# Patient Record
Sex: FEMALE | Race: WHITE | Employment: FULL TIME
[De-identification: names, ages, dates, MRNs, and addresses within clinical notes are randomized per-mention and may not be internally consistent; named-entity substitution may affect disease eponyms.]

---

## 2017-02-16 ENCOUNTER — OFFICE VISIT (OUTPATIENT)
Dept: FAMILY MEDICINE CLINIC | Facility: CLINIC | Age: 62
End: 2017-02-16

## 2017-02-16 VITALS
SYSTOLIC BLOOD PRESSURE: 144 MMHG | WEIGHT: 234 LBS | DIASTOLIC BLOOD PRESSURE: 80 MMHG | TEMPERATURE: 98.8 F | BODY MASS INDEX: 33.58 KG/M2

## 2017-02-16 DIAGNOSIS — J40 BRONCHITIS: ICD-10-CM

## 2017-02-16 DIAGNOSIS — J06.9 VIRAL URI: Primary | ICD-10-CM

## 2017-02-16 PROCEDURE — 99213 OFFICE O/P EST LOW 20 MIN: CPT | Performed by: FAMILY MEDICINE

## 2017-02-16 RX ORDER — AZITHROMYCIN 250 MG/1
TABLET, FILM COATED ORAL
Qty: 1 PACKET | Refills: 0 | Status: SHIPPED | OUTPATIENT
Start: 2017-02-16 | End: 2017-05-10 | Stop reason: ALTCHOICE

## 2017-02-16 ASSESSMENT — ENCOUNTER SYMPTOMS
SHORTNESS OF BREATH: 1
VOICE CHANGE: 1
DIARRHEA: 0
TROUBLE SWALLOWING: 1
SORE THROAT: 1
NAUSEA: 0
VOMITING: 0
SINUS PRESSURE: 1
RHINORRHEA: 1
BACK PAIN: 0
COUGH: 1

## 2017-05-10 ENCOUNTER — OFFICE VISIT (OUTPATIENT)
Dept: FAMILY MEDICINE CLINIC | Age: 62
End: 2017-05-10
Payer: COMMERCIAL

## 2017-05-10 VITALS
HEART RATE: 75 BPM | WEIGHT: 240 LBS | DIASTOLIC BLOOD PRESSURE: 72 MMHG | BODY MASS INDEX: 34.44 KG/M2 | SYSTOLIC BLOOD PRESSURE: 124 MMHG

## 2017-05-10 DIAGNOSIS — S86.912A KNEE STRAIN, LEFT, INITIAL ENCOUNTER: Primary | ICD-10-CM

## 2017-05-10 PROCEDURE — 99213 OFFICE O/P EST LOW 20 MIN: CPT | Performed by: FAMILY MEDICINE

## 2017-05-10 RX ORDER — ETODOLAC 500 MG/1
500 TABLET, FILM COATED ORAL 2 TIMES DAILY PRN
Qty: 40 TABLET | Refills: 1 | Status: SHIPPED | OUTPATIENT
Start: 2017-05-10 | End: 2018-01-09 | Stop reason: ALTCHOICE

## 2017-05-10 ASSESSMENT — ENCOUNTER SYMPTOMS
COUGH: 0
BACK PAIN: 0
VOMITING: 0
DIARRHEA: 0
SINUS PRESSURE: 0
SHORTNESS OF BREATH: 0
SORE THROAT: 0
NAUSEA: 0

## 2017-05-12 DIAGNOSIS — S86.912A KNEE STRAIN, LEFT, INITIAL ENCOUNTER: ICD-10-CM

## 2017-06-13 DIAGNOSIS — E11.9 TYPE 2 DIABETES MELLITUS WITHOUT COMPLICATION, UNSPECIFIED LONG TERM INSULIN USE STATUS: ICD-10-CM

## 2017-06-13 DIAGNOSIS — E78.5 DYSLIPIDEMIA: ICD-10-CM

## 2017-06-13 LAB
ALBUMIN SERPL-MCNC: 4.1 G/DL
ALP BLD-CCNC: 64 U/L
ALT SERPL-CCNC: 35 U/L
AST SERPL-CCNC: 27 U/L
BASOPHILS ABSOLUTE: 49 /ΜL
BASOPHILS RELATIVE PERCENT: 0.7 %
BILIRUB SERPL-MCNC: 0.5 MG/DL (ref 0.1–1.4)
BUN BLDV-MCNC: 17 MG/DL
CALCIUM SERPL-MCNC: 9.7 MG/DL
CHLORIDE BLD-SCNC: 106 MMOL/L
CHOLESTEROL, TOTAL: 103 MG/DL
CHOLESTEROL/HDL RATIO: 3.1
CO2: 26 MMOL/L
CREAT SERPL-MCNC: 0.77 MG/DL
EOSINOPHILS ABSOLUTE: 217 /ΜL
EOSINOPHILS RELATIVE PERCENT: 3.1 %
GFR CALCULATED: 84
GLUCOSE BLD-MCNC: 186 MG/DL
HBA1C MFR BLD: 9.4 %
HCT VFR BLD CALC: 43.8 % (ref 36–46)
HDLC SERPL-MCNC: 33 MG/DL (ref 35–70)
HEMOGLOBIN: 14.4 G/DL (ref 12–16)
LDL CHOLESTEROL CALCULATED: 47 MG/DL (ref 0–160)
LYMPHOCYTES ABSOLUTE: 2184 /ΜL
LYMPHOCYTES RELATIVE PERCENT: 31.2 %
MCH RBC QN AUTO: 31.4 PG
MCHC RBC AUTO-ENTMCNC: 32.9 G/DL
MCV RBC AUTO: 95.4 FL
MONOCYTES ABSOLUTE: 616 /ΜL
MONOCYTES RELATIVE PERCENT: 8.8 %
NEUTROPHILS ABSOLUTE: 3934 /ΜL
NEUTROPHILS RELATIVE PERCENT: 56.2 %
PDW BLD-RTO: 12.4 %
PLATELET # BLD: 196 K/ΜL
PMV BLD AUTO: 12.1 FL
POTASSIUM SERPL-SCNC: 4.6 MMOL/L
RBC # BLD: 4.59 10^6/ΜL
SODIUM BLD-SCNC: 139 MMOL/L
TOTAL PROTEIN: 7.1
TRIGL SERPL-MCNC: 117 MG/DL
VLDLC SERPL CALC-MCNC: ABNORMAL MG/DL
WBC # BLD: 7 10^3/ML

## 2017-06-16 ENCOUNTER — OFFICE VISIT (OUTPATIENT)
Dept: FAMILY MEDICINE CLINIC | Age: 62
End: 2017-06-16
Payer: COMMERCIAL

## 2017-06-16 VITALS
SYSTOLIC BLOOD PRESSURE: 120 MMHG | WEIGHT: 242 LBS | DIASTOLIC BLOOD PRESSURE: 64 MMHG | HEART RATE: 91 BPM | BODY MASS INDEX: 38.89 KG/M2 | HEIGHT: 66 IN

## 2017-06-16 DIAGNOSIS — E11.9 TYPE 2 DIABETES MELLITUS WITHOUT COMPLICATION, UNSPECIFIED LONG TERM INSULIN USE STATUS: Primary | ICD-10-CM

## 2017-06-16 LAB
CREATININE URINE POCT: 200
MICROALBUMIN/CREAT 24H UR: 30 MG/G{CREAT}
MICROALBUMIN/CREAT UR-RTO: <30

## 2017-06-16 PROCEDURE — 99213 OFFICE O/P EST LOW 20 MIN: CPT | Performed by: FAMILY MEDICINE

## 2017-06-16 PROCEDURE — 82044 UR ALBUMIN SEMIQUANTITATIVE: CPT | Performed by: FAMILY MEDICINE

## 2017-06-16 ASSESSMENT — ENCOUNTER SYMPTOMS
COUGH: 0
VOMITING: 0
SINUS PRESSURE: 0
BACK PAIN: 0
SORE THROAT: 0
SHORTNESS OF BREATH: 0
DIARRHEA: 0
NAUSEA: 0

## 2017-06-16 ASSESSMENT — PATIENT HEALTH QUESTIONNAIRE - PHQ9
SUM OF ALL RESPONSES TO PHQ QUESTIONS 1-9: 1
SUM OF ALL RESPONSES TO PHQ9 QUESTIONS 1 & 2: 1
1. LITTLE INTEREST OR PLEASURE IN DOING THINGS: 0
2. FEELING DOWN, DEPRESSED OR HOPELESS: 1

## 2018-01-03 RX ORDER — ASPIRIN 81 MG
TABLET, DELAYED RELEASE (ENTERIC COATED) ORAL
Qty: 90 TABLET | Refills: 3 | Status: SHIPPED | OUTPATIENT
Start: 2018-01-03 | End: 2019-03-18 | Stop reason: SDUPTHER

## 2018-01-05 LAB
ALBUMIN SERPL-MCNC: 3.9 G/DL
ALP BLD-CCNC: 73 U/L
ALT SERPL-CCNC: 36 U/L
ANION GAP SERPL CALCULATED.3IONS-SCNC: NORMAL MMOL/L
AST SERPL-CCNC: 30 U/L
AVERAGE GLUCOSE: NORMAL
BASOPHILS ABSOLUTE: 0.8 /ΜL
BASOPHILS RELATIVE PERCENT: 50 %
BILIRUB SERPL-MCNC: 0.5 MG/DL (ref 0.1–1.4)
BUN BLDV-MCNC: 15 MG/DL
CALCIUM SERPL-MCNC: 9.3 MG/DL
CHLORIDE BLD-SCNC: 104 MMOL/L
CHOLESTEROL, TOTAL: 108 MG/DL
CHOLESTEROL/HDL RATIO: 3.6
CO2: 28 MMOL/L
CREAT SERPL-MCNC: 0.65 MG/DL
EOSINOPHILS ABSOLUTE: 2.1 /ΜL
EOSINOPHILS RELATIVE PERCENT: 130 %
GFR CALCULATED: 96
GLUCOSE BLD-MCNC: 269 MG/DL
HBA1C MFR BLD: 10.6 %
HCT VFR BLD CALC: 43.5 % (ref 36–46)
HDLC SERPL-MCNC: 30 MG/DL (ref 35–70)
HEMOGLOBIN: 14.5 G/DL (ref 12–16)
LDL CHOLESTEROL CALCULATED: 56 MG/DL (ref 0–160)
LYMPHOCYTES ABSOLUTE: 39.3 /ΜL
LYMPHOCYTES RELATIVE PERCENT: 2437 %
MCH RBC QN AUTO: 31.1 PG
MCHC RBC AUTO-ENTMCNC: 33.3 G/DL
MCV RBC AUTO: 93.3 FL
MONOCYTES ABSOLUTE: 9.3 /ΜL
MONOCYTES RELATIVE PERCENT: 577 %
NEUTROPHILS ABSOLUTE: 48.5 /ΜL
NEUTROPHILS RELATIVE PERCENT: 3007 %
PDW BLD-RTO: 11.9 %
PLATELET # BLD: 168 K/ΜL
PMV BLD AUTO: 13.2 FL
POTASSIUM SERPL-SCNC: 3.9 MMOL/L
RBC # BLD: 4.66 10^6/ΜL
SODIUM BLD-SCNC: 139 MMOL/L
TOTAL PROTEIN: 6.6
TRIGL SERPL-MCNC: 136 MG/DL
VLDLC SERPL CALC-MCNC: ABNORMAL MG/DL
WBC # BLD: 6.2 10^3/ML

## 2018-01-08 DIAGNOSIS — E11.9 TYPE 2 DIABETES MELLITUS WITHOUT COMPLICATION, UNSPECIFIED LONG TERM INSULIN USE STATUS: ICD-10-CM

## 2018-01-09 ENCOUNTER — OFFICE VISIT (OUTPATIENT)
Dept: FAMILY MEDICINE CLINIC | Age: 63
End: 2018-01-09
Payer: COMMERCIAL

## 2018-01-09 VITALS
WEIGHT: 232 LBS | SYSTOLIC BLOOD PRESSURE: 120 MMHG | HEART RATE: 108 BPM | DIASTOLIC BLOOD PRESSURE: 70 MMHG | BODY MASS INDEX: 37.28 KG/M2 | OXYGEN SATURATION: 98 % | HEIGHT: 66 IN

## 2018-01-09 DIAGNOSIS — I25.10 ASHD (ARTERIOSCLEROTIC HEART DISEASE): ICD-10-CM

## 2018-01-09 DIAGNOSIS — L82.1 SK (SEBORRHEIC KERATOSIS): ICD-10-CM

## 2018-01-09 DIAGNOSIS — H01.139 ECZEMA OF EYELID, UNSPECIFIED LATERALITY: ICD-10-CM

## 2018-01-09 DIAGNOSIS — E11.9 TYPE 2 DIABETES MELLITUS WITHOUT COMPLICATION, UNSPECIFIED LONG TERM INSULIN USE STATUS: Primary | ICD-10-CM

## 2018-01-09 DIAGNOSIS — E78.5 DYSLIPIDEMIA: ICD-10-CM

## 2018-01-09 PROCEDURE — 99213 OFFICE O/P EST LOW 20 MIN: CPT | Performed by: FAMILY MEDICINE

## 2018-01-09 ASSESSMENT — ENCOUNTER SYMPTOMS
EYE ITCHING: 1
SORE THROAT: 0
SHORTNESS OF BREATH: 0
DIARRHEA: 0
SINUS PRESSURE: 0
VOMITING: 0
FACIAL SWELLING: 0
COUGH: 0
NAUSEA: 0
VOICE CHANGE: 0
BACK PAIN: 0

## 2018-01-22 RX ORDER — LISINOPRIL 2.5 MG/1
TABLET ORAL
Qty: 90 TABLET | Refills: 3 | Status: SHIPPED | OUTPATIENT
Start: 2018-01-22 | End: 2019-01-04 | Stop reason: SDUPTHER

## 2018-01-22 RX ORDER — METFORMIN HYDROCHLORIDE 500 MG/1
TABLET, EXTENDED RELEASE ORAL
Qty: 360 TABLET | Refills: 3 | Status: SHIPPED | OUTPATIENT
Start: 2018-01-22 | End: 2018-05-24 | Stop reason: ALTCHOICE

## 2018-01-22 RX ORDER — ATORVASTATIN CALCIUM 80 MG/1
TABLET, FILM COATED ORAL
Qty: 90 TABLET | Refills: 3 | Status: SHIPPED | OUTPATIENT
Start: 2018-01-22 | End: 2019-01-04 | Stop reason: SDUPTHER

## 2018-01-22 RX ORDER — FENOFIBRATE 160 MG/1
TABLET ORAL
Qty: 90 TABLET | Refills: 3 | Status: SHIPPED | OUTPATIENT
Start: 2018-01-22 | End: 2019-01-04 | Stop reason: SDUPTHER

## 2018-03-30 ENCOUNTER — TELEPHONE (OUTPATIENT)
Dept: FAMILY MEDICINE CLINIC | Age: 63
End: 2018-03-30

## 2018-03-30 NOTE — TELEPHONE ENCOUNTER
The patient was calling in to see if she can get a refill of her Humulin. She says she was advised at her last appt to increase to 80 units in the am and 60 in the pm. She says she is due for a refill and will be out and she is going out of town next week and unless she gets her new rx she cannot get the medication refill. She says you advised her to stop some medications and change when she takes others. Please send to [facility] on [address].

## 2018-04-30 ENCOUNTER — TELEPHONE (OUTPATIENT)
Dept: FAMILY MEDICINE CLINIC | Age: 63
End: 2018-04-30

## 2018-04-30 RX ORDER — CEPHALEXIN 500 MG/1
500 CAPSULE ORAL 3 TIMES DAILY
Qty: 30 CAPSULE | Refills: 0 | Status: SHIPPED | OUTPATIENT
Start: 2018-04-30 | End: 2018-05-10

## 2018-05-24 ENCOUNTER — OFFICE VISIT (OUTPATIENT)
Dept: FAMILY MEDICINE CLINIC | Age: 63
End: 2018-05-24
Payer: COMMERCIAL

## 2018-05-24 VITALS
HEART RATE: 90 BPM | SYSTOLIC BLOOD PRESSURE: 130 MMHG | DIASTOLIC BLOOD PRESSURE: 70 MMHG | BODY MASS INDEX: 37.77 KG/M2 | WEIGHT: 234 LBS

## 2018-05-24 DIAGNOSIS — E11.9 TYPE 2 DIABETES MELLITUS WITHOUT COMPLICATION, UNSPECIFIED LONG TERM INSULIN USE STATUS: Primary | ICD-10-CM

## 2018-05-24 LAB — HBA1C MFR BLD: 12.5 %

## 2018-05-24 PROCEDURE — 83036 HEMOGLOBIN GLYCOSYLATED A1C: CPT | Performed by: FAMILY MEDICINE

## 2018-05-24 PROCEDURE — 99213 OFFICE O/P EST LOW 20 MIN: CPT | Performed by: FAMILY MEDICINE

## 2018-05-24 ASSESSMENT — ENCOUNTER SYMPTOMS
NAUSEA: 0
DIARRHEA: 0
SORE THROAT: 0
VOMITING: 0
BACK PAIN: 0
SHORTNESS OF BREATH: 0
COUGH: 0
SINUS PRESSURE: 0

## 2018-06-21 DIAGNOSIS — H01.139 ECZEMA OF EYELID, UNSPECIFIED LATERALITY: ICD-10-CM

## 2018-09-09 LAB
ALBUMIN SERPL-MCNC: 4.2 G/DL (ref 3.5–5.2)
ALK PHOSPHATASE: 84 U/L (ref 30–121)
ALT SERPL-CCNC: 40 U/L (ref 5–40)
ANION GAP SERPL CALCULATED.3IONS-SCNC: 12 MEQ/L (ref 10–19)
AST SERPL-CCNC: 38 U/L (ref 9–40)
AVERAGE GLUCOSE: 240 MG/DL (ref 66–114)
BILIRUB SERPL-MCNC: 0.5 MG/DL
BUN BLDV-MCNC: 19 MG/DL (ref 8–23)
CALCIUM SERPL-MCNC: 9.7 MG/DL (ref 8.5–10.5)
CHLORIDE BLD-SCNC: 101 MEQ/L (ref 95–107)
CHOLESTEROL/HDL RATIO: 3.6
CHOLESTEROL: 116 MG/DL
CO2: 28 MEQ/L (ref 19–31)
CREAT SERPL-MCNC: 0.7 MG/DL (ref 0.6–1.3)
EGFR AFRICAN AMERICAN: 108.4 ML/MIN/1.73 M2
EGFR IF NONAFRICAN AMERICAN: 93.5 ML/MIN/1.73 M2
GLUCOSE: 165 MG/DL (ref 70–99)
HBA1C MFR BLD: 10 % (ref 4.2–5.8)
HDLC SERPL-MCNC: 32.1 MG/DL
LDL CHOLESTEROL CALCULATED: 61 MG/DL
LDL/HDL RATIO: 1.9
POTASSIUM SERPL-SCNC: 5 MEQ/L (ref 3.5–5.4)
SODIUM BLD-SCNC: 141 MEQ/L (ref 135–146)
TOTAL PROTEIN: 6.9 G/DL (ref 6.1–8.3)
TRIGL SERPL-MCNC: 116 MG/DL
VLDLC SERPL CALC-MCNC: 23 MG/DL

## 2018-09-10 LAB
ABSOLUTE BASO #: 0.1 K/UL (ref 0–0.1)
ABSOLUTE EOS #: 0.1 K/UL (ref 0.1–0.4)
ABSOLUTE LYMPH #: 2.3 K/UL (ref 0.8–5.2)
ABSOLUTE MONO #: 0.6 K/UL (ref 0.1–0.9)
ABSOLUTE NEUT #: 3.6 K/UL (ref 1.3–9.1)
BASOPHILS RELATIVE PERCENT: 0.9 %
COMMENT: NORMAL
EOSINOPHILS RELATIVE PERCENT: 1.6 %
HCT VFR BLD CALC: 45 % (ref 36–48)
HEMOGLOBIN: 15.3 G/DL (ref 12–16)
LYMPHOCYTE %: 34.4 %
MCH RBC QN AUTO: 30.8 PG (ref 27–34)
MCHC RBC AUTO-ENTMCNC: 34 G/DL (ref 31–36)
MCV RBC AUTO: 90.5 FL (ref 80–100)
MONOCYTES # BLD: 9.2 %
NEUTROPHILS RELATIVE PERCENT: 53.6 %
PDW BLD-RTO: 12.1 % (ref 10.8–14.8)
PLATELETS: 167 K/UL (ref 150–450)
RBC: 4.97 M/UL (ref 4–5.5)
WBC: 6.8 K/UL (ref 3.7–10.8)

## 2018-09-26 ENCOUNTER — OFFICE VISIT (OUTPATIENT)
Dept: FAMILY MEDICINE CLINIC | Age: 63
End: 2018-09-26
Payer: COMMERCIAL

## 2018-09-26 VITALS
WEIGHT: 231 LBS | SYSTOLIC BLOOD PRESSURE: 112 MMHG | HEART RATE: 90 BPM | HEIGHT: 67 IN | BODY MASS INDEX: 36.26 KG/M2 | DIASTOLIC BLOOD PRESSURE: 78 MMHG

## 2018-09-26 DIAGNOSIS — Z23 IMMUNIZATION DUE: ICD-10-CM

## 2018-09-26 DIAGNOSIS — E11.9 TYPE 2 DIABETES MELLITUS WITHOUT COMPLICATION, UNSPECIFIED WHETHER LONG TERM INSULIN USE (HCC): Primary | ICD-10-CM

## 2018-09-26 DIAGNOSIS — L21.9 SEBORRHEA: ICD-10-CM

## 2018-09-26 DIAGNOSIS — E78.5 DYSLIPIDEMIA: ICD-10-CM

## 2018-09-26 LAB
CREATININE URINE POCT: 50
MICROALBUMIN/CREAT 24H UR: 10 MG/G{CREAT}
MICROALBUMIN/CREAT UR-RTO: <30

## 2018-09-26 PROCEDURE — 90471 IMMUNIZATION ADMIN: CPT | Performed by: FAMILY MEDICINE

## 2018-09-26 PROCEDURE — 99396 PREV VISIT EST AGE 40-64: CPT | Performed by: FAMILY MEDICINE

## 2018-09-26 PROCEDURE — 82044 UR ALBUMIN SEMIQUANTITATIVE: CPT | Performed by: FAMILY MEDICINE

## 2018-09-26 PROCEDURE — 90688 IIV4 VACCINE SPLT 0.5 ML IM: CPT | Performed by: FAMILY MEDICINE

## 2018-09-26 ASSESSMENT — PATIENT HEALTH QUESTIONNAIRE - PHQ9
SUM OF ALL RESPONSES TO PHQ QUESTIONS 1-9: 0
2. FEELING DOWN, DEPRESSED OR HOPELESS: 0
SUM OF ALL RESPONSES TO PHQ9 QUESTIONS 1 & 2: 0
1. LITTLE INTEREST OR PLEASURE IN DOING THINGS: 0
SUM OF ALL RESPONSES TO PHQ QUESTIONS 1-9: 0

## 2018-09-26 ASSESSMENT — ENCOUNTER SYMPTOMS
SHORTNESS OF BREATH: 0
VOMITING: 0
BACK PAIN: 1
SINUS PRESSURE: 0
COUGH: 0
NAUSEA: 0
DIARRHEA: 0
SORE THROAT: 0

## 2018-09-26 NOTE — PROGRESS NOTES
Bear Roa is a 66 y.o. female who presents today for her medical conditions/complaints as noted below. Bear Roa is c/o of Annual Exam; Discuss Labs; and Health Maintenance (micro after visit)    Routine follow up on DM she is restricting CHO. A1c has improved. HPI:     Visit Information    Have you changed or started any medications since your last visit including any over-the-counter medicines, vitamins, or herbal medicines? no   Are you having any side effects from any of your medications? -  no  Have you stopped taking any of your medications? Is so, why? -  no    Have you seen any other physician or provider since your last visit? No  Have you had any other diagnostic tests since your last visit? No  Have you been seen in the emergency room and/or had an admission to a hospital since we last saw you? No  Have you had your routine dental cleaning in the past 6 months? no    Have you activated your [employer] account? If not, what are your barriers?  Yes     Patient Care Team:  Maureen Diaz MD as PCP - General (Family Medicine)  Eve Moyer MD as Consulting Physician    Medical History Review  Past Medical, Family, and Social History reviewed and  contribute to the patient presenting condition    Health Maintenance   Topic Date Due    Hepatitis C screen  1955    HIV screen  04/09/1972    Shingles Vaccine (1 of 2 - 2 Dose Series) 04/09/2007    Diabetic retinal exam  08/18/2016    Diabetic foot exam  06/16/2018    Diabetic microalbuminuria test  06/16/2018    Flu vaccine (1) 09/01/2018    A1C test (Diabetic or Prediabetic)  12/08/2018    Breast cancer screen  12/22/2018    Lipid screen  09/08/2019    Potassium monitoring  09/08/2019    Creatinine monitoring  09/08/2019    Cervical cancer screen  12/21/2019    DTaP/Tdap/Td vaccine (2 - Td) 09/13/2023    Colon cancer screen colonoscopy  01/10/2027    Pneumococcal med risk  Completed       Past Medical History: problem, urgency and vaginal discharge. Musculoskeletal: Positive for arthralgias and back pain. Negative for myalgias. Skin: Negative for rash. Neurological: Negative for weakness, numbness and headaches. Psychiatric/Behavioral: Negative for sleep disturbance. Objective:   /78   Pulse 90   Ht 5' 7\" (1.702 m)   Wt 231 lb (104.8 kg)   BMI 36.18 kg/m²     Physical Exam   Constitutional: She is oriented to person, place, and time. She appears well-developed and well-nourished. No distress. HENT:   Head: Normocephalic and atraumatic. Mouth/Throat: No oropharyngeal exudate. Eyes: No scleral icterus. Neck: Neck supple. Carotid bruit is not present. Cardiovascular: Exam reveals no gallop and no friction rub. No murmur heard. Pulmonary/Chest: No respiratory distress. She has no wheezes. She has no rales. She exhibits no tenderness. Musculoskeletal: She exhibits no edema. Lymphadenopathy:     She has no cervical adenopathy. Neurological: She is alert and oriented to person, place, and time. No cranial nerve deficit. Sensory exam normal with monofilament testing, position sense is intact. Vascular exam is likewise normal.     Skin: Lesion (seborrhea changes scalp.) noted. No rash noted. She is not diaphoretic. Psychiatric: She has a normal mood and affect. Her behavior is normal. Judgment and thought content normal.       Assessment:       Diagnosis Orders   1. Type 2 diabetes mellitus without complication, unspecified whether long term insulin use (HCC)  POCT microalbumin    HM DIABETES FOOT EXAM    CBC Auto Differential    Comprehensive Metabolic Panel    Lipid Panel    Hemoglobin A1C   2. Immunization due  INFLUENZA, QUADV, 3 YRS AND OLDER, IM, MDV, 0.5ML (FLUZONE QUADV)   3. Dyslipidemia  Comprehensive Metabolic Panel    Lipid Panel   4. Seborrhea           Plan:      Return in about 6 months (around 3/26/2019).     Orders Placed This Encounter   Procedures    INFLUENZA, QUADV, 3 YRS AND OLDER, IM, MDV, 0.5ML (FLUZONE QUADV)    CBC Auto Differential     Standing Status:   Future     Standing Expiration Date:   9/26/2019    Comprehensive Metabolic Panel     Standing Status:   Future     Standing Expiration Date:   9/26/2019    Lipid Panel     Standing Status:   Future     Standing Expiration Date:   9/26/2019     Order Specific Question:   Is Patient Fasting?/# of Hours     Answer:   12 hour fast    Hemoglobin A1C     Standing Status:   Future     Standing Expiration Date:   9/26/2019    POCT microalbumin    HM DIABETES FOOT EXAM     No orders of the defined types were placed in this encounter. OTC HC crm to inflamed area avoid H2O2  Limit ASA, Exercise  Six month follow up.

## 2018-10-22 ENCOUNTER — OFFICE VISIT (OUTPATIENT)
Dept: FAMILY MEDICINE CLINIC | Age: 63
End: 2018-10-22
Payer: COMMERCIAL

## 2018-10-22 VITALS
WEIGHT: 230 LBS | DIASTOLIC BLOOD PRESSURE: 80 MMHG | HEART RATE: 99 BPM | TEMPERATURE: 98 F | BODY MASS INDEX: 36.02 KG/M2 | SYSTOLIC BLOOD PRESSURE: 136 MMHG

## 2018-10-22 DIAGNOSIS — J40 BRONCHITIS: Primary | ICD-10-CM

## 2018-10-22 PROCEDURE — 99213 OFFICE O/P EST LOW 20 MIN: CPT | Performed by: FAMILY MEDICINE

## 2018-10-22 RX ORDER — AZITHROMYCIN 250 MG/1
TABLET, FILM COATED ORAL
Qty: 1 PACKET | Refills: 0 | Status: SHIPPED | OUTPATIENT
Start: 2018-10-22 | End: 2019-03-07 | Stop reason: ALTCHOICE

## 2018-10-22 ASSESSMENT — ENCOUNTER SYMPTOMS
SINUS PRESSURE: 0
DIARRHEA: 0
COUGH: 1
VOMITING: 0
SORE THROAT: 1
SHORTNESS OF BREATH: 1
BACK PAIN: 0
NAUSEA: 0

## 2018-11-01 ENCOUNTER — TELEPHONE (OUTPATIENT)
Dept: FAMILY MEDICINE CLINIC | Age: 63
End: 2018-11-01

## 2018-11-01 RX ORDER — AZITHROMYCIN 250 MG/1
TABLET, FILM COATED ORAL
Qty: 1 PACKET | Refills: 0 | Status: SHIPPED | OUTPATIENT
Start: 2018-11-01 | End: 2019-03-07 | Stop reason: ALTCHOICE

## 2019-01-04 RX ORDER — FENOFIBRATE 160 MG/1
TABLET ORAL
Qty: 90 TABLET | Refills: 3 | Status: SHIPPED | OUTPATIENT
Start: 2019-01-04 | End: 2019-03-18 | Stop reason: SDUPTHER

## 2019-01-04 RX ORDER — LISINOPRIL 2.5 MG/1
TABLET ORAL
Qty: 90 TABLET | Refills: 3 | Status: SHIPPED | OUTPATIENT
Start: 2019-01-04 | End: 2019-03-18 | Stop reason: SDUPTHER

## 2019-01-04 RX ORDER — ATORVASTATIN CALCIUM 80 MG/1
TABLET, FILM COATED ORAL
Qty: 90 TABLET | Refills: 3 | Status: SHIPPED | OUTPATIENT
Start: 2019-01-04 | End: 2019-03-18 | Stop reason: SDUPTHER

## 2019-01-18 ENCOUNTER — TELEPHONE (OUTPATIENT)
Dept: FAMILY MEDICINE CLINIC | Age: 64
End: 2019-01-18

## 2019-02-18 DIAGNOSIS — Z12.31 ENCOUNTER FOR SCREENING MAMMOGRAM FOR BREAST CANCER: Primary | ICD-10-CM

## 2019-02-28 ENCOUNTER — HOSPITAL ENCOUNTER (OUTPATIENT)
Dept: MAMMOGRAPHY | Age: 64
Discharge: HOME | End: 2019-03-02
Payer: COMMERCIAL

## 2019-02-28 DIAGNOSIS — Z12.31 ENCOUNTER FOR SCREENING MAMMOGRAM FOR BREAST CANCER: ICD-10-CM

## 2019-02-28 LAB
ABSOLUTE BASO #: 0.1 K/UL (ref 0–0.1)
ABSOLUTE EOS #: 0.1 K/UL (ref 0.1–0.4)
ABSOLUTE LYMPH #: 2 K/UL (ref 0.8–5.2)
ABSOLUTE MONO #: 0.7 K/UL (ref 0.1–0.9)
ABSOLUTE NEUT #: 3.6 K/UL (ref 1.3–9.1)
ALBUMIN SERPL-MCNC: 4.1 G/DL (ref 3.5–5.2)
ALK PHOSPHATASE: 85 U/L (ref 30–121)
ALT SERPL-CCNC: 55 U/L (ref 5–40)
ANION GAP SERPL CALCULATED.3IONS-SCNC: 10 MEQ/L (ref 10–19)
AST SERPL-CCNC: 43 U/L (ref 9–40)
BASOPHILS RELATIVE PERCENT: 0.8 %
BILIRUB SERPL-MCNC: 0.7 MG/DL
BUN BLDV-MCNC: 14 MG/DL (ref 8–23)
CALCIUM SERPL-MCNC: 10.4 MG/DL (ref 8.5–10.5)
CHLORIDE BLD-SCNC: 100 MEQ/L (ref 95–107)
CHOLESTEROL/HDL RATIO: 3.6
CHOLESTEROL: 116 MG/DL
CO2: 29 MEQ/L (ref 19–31)
CREAT SERPL-MCNC: 0.8 MG/DL (ref 0.6–1.3)
EGFR AFRICAN AMERICAN: 92.2 ML/MIN/1.73 M2
EGFR IF NONAFRICAN AMERICAN: 79.6 ML/MIN/1.73 M2
EOSINOPHILS RELATIVE PERCENT: 2.2 %
GLUCOSE: 333 MG/DL (ref 70–99)
HCT VFR BLD CALC: 48 % (ref 36–48)
HDLC SERPL-MCNC: 31.9 MG/DL
HEMOGLOBIN: 15.9 G/DL (ref 12–16)
LDL CHOLESTEROL CALCULATED: 64 MG/DL
LDL/HDL RATIO: 2
LYMPHOCYTE %: 30.4 %
MCH RBC QN AUTO: 31.4 PG (ref 27–34)
MCHC RBC AUTO-ENTMCNC: 33.1 G/DL (ref 31–36)
MCV RBC AUTO: 94.7 FL (ref 80–100)
MONOCYTES # BLD: 10.1 %
NEUTROPHILS RELATIVE PERCENT: 56.2 %
PDW BLD-RTO: 11.6 % (ref 10.8–14.8)
PLATELETS: 179 K/UL (ref 150–450)
POTASSIUM SERPL-SCNC: 4.7 MEQ/L (ref 3.5–5.4)
RBC: 5.07 M/UL (ref 4–5.5)
SODIUM BLD-SCNC: 139 MEQ/L (ref 135–146)
TOTAL PROTEIN: 6.9 G/DL (ref 6.1–8.3)
TRIGL SERPL-MCNC: 99 MG/DL
VLDLC SERPL CALC-MCNC: 20 MG/DL
WBC: 6.4 K/UL (ref 3.7–10.8)

## 2019-02-28 PROCEDURE — 77063 BREAST TOMOSYNTHESIS BI: CPT

## 2019-03-01 LAB
AVERAGE GLUCOSE: 283 MG/DL (ref 66–114)
HBA1C MFR BLD: 11.5 %

## 2019-03-07 ENCOUNTER — OFFICE VISIT (OUTPATIENT)
Dept: FAMILY MEDICINE CLINIC | Age: 64
End: 2019-03-07
Payer: COMMERCIAL

## 2019-03-07 VITALS
SYSTOLIC BLOOD PRESSURE: 130 MMHG | BODY MASS INDEX: 36.65 KG/M2 | HEART RATE: 83 BPM | WEIGHT: 234 LBS | DIASTOLIC BLOOD PRESSURE: 68 MMHG

## 2019-03-07 DIAGNOSIS — E78.5 DYSLIPIDEMIA: ICD-10-CM

## 2019-03-07 DIAGNOSIS — E11.9 TYPE 2 DIABETES MELLITUS WITHOUT COMPLICATION, UNSPECIFIED WHETHER LONG TERM INSULIN USE (HCC): Primary | ICD-10-CM

## 2019-03-07 PROCEDURE — 99213 OFFICE O/P EST LOW 20 MIN: CPT | Performed by: FAMILY MEDICINE

## 2019-03-07 ASSESSMENT — ENCOUNTER SYMPTOMS
SORE THROAT: 0
BACK PAIN: 0
VOMITING: 0
SHORTNESS OF BREATH: 0
DIARRHEA: 0
NAUSEA: 0
COUGH: 0
SINUS PRESSURE: 0

## 2019-03-07 ASSESSMENT — PATIENT HEALTH QUESTIONNAIRE - PHQ9
2. FEELING DOWN, DEPRESSED OR HOPELESS: 0
SUM OF ALL RESPONSES TO PHQ QUESTIONS 1-9: 0
1. LITTLE INTEREST OR PLEASURE IN DOING THINGS: 0
SUM OF ALL RESPONSES TO PHQ QUESTIONS 1-9: 0
SUM OF ALL RESPONSES TO PHQ9 QUESTIONS 1 & 2: 0

## 2019-03-18 RX ORDER — ATORVASTATIN CALCIUM 80 MG/1
TABLET, FILM COATED ORAL
Qty: 90 TABLET | Refills: 3 | Status: SHIPPED | OUTPATIENT
Start: 2019-03-18 | End: 2020-04-27

## 2019-03-18 RX ORDER — LISINOPRIL 2.5 MG/1
TABLET ORAL
Qty: 90 TABLET | Refills: 3 | Status: SHIPPED | OUTPATIENT
Start: 2019-03-18 | End: 2020-02-10

## 2019-03-18 RX ORDER — ASPIRIN 81 MG/1
TABLET ORAL
Qty: 90 TABLET | Refills: 3 | Status: SHIPPED | OUTPATIENT
Start: 2019-03-18 | End: 2020-02-19

## 2019-03-18 RX ORDER — FENOFIBRATE 160 MG/1
TABLET ORAL
Qty: 90 TABLET | Refills: 3 | Status: SHIPPED | OUTPATIENT
Start: 2019-03-18 | End: 2020-02-10

## 2019-05-07 RX ORDER — INSULIN HUMAN 100 [IU]/ML
INJECTION, SUSPENSION SUBCUTANEOUS
Qty: 135 ML | Refills: 0 | Status: SHIPPED | OUTPATIENT
Start: 2019-05-07 | End: 2019-07-19 | Stop reason: SDUPTHER

## 2019-05-23 ENCOUNTER — TELEPHONE (OUTPATIENT)
Dept: FAMILY MEDICINE CLINIC | Age: 64
End: 2019-05-23

## 2019-05-23 RX ORDER — FLUCONAZOLE 150 MG/1
TABLET ORAL
Qty: 1 TABLET | Refills: 0 | Status: SHIPPED | OUTPATIENT
Start: 2019-05-23 | End: 2020-03-06 | Stop reason: ALTCHOICE

## 2019-05-31 ENCOUNTER — OFFICE VISIT (OUTPATIENT)
Dept: FAMILY MEDICINE CLINIC | Age: 64
End: 2019-05-31
Payer: COMMERCIAL

## 2019-05-31 VITALS
SYSTOLIC BLOOD PRESSURE: 132 MMHG | WEIGHT: 237 LBS | BODY MASS INDEX: 37.12 KG/M2 | HEART RATE: 82 BPM | OXYGEN SATURATION: 97 % | DIASTOLIC BLOOD PRESSURE: 70 MMHG

## 2019-05-31 DIAGNOSIS — E11.9 TYPE 2 DIABETES MELLITUS WITHOUT COMPLICATION, UNSPECIFIED WHETHER LONG TERM INSULIN USE (HCC): Primary | ICD-10-CM

## 2019-05-31 DIAGNOSIS — K13.0 INFLAMMATION OF LIPS: ICD-10-CM

## 2019-05-31 PROCEDURE — 99213 OFFICE O/P EST LOW 20 MIN: CPT | Performed by: FAMILY MEDICINE

## 2019-05-31 RX ORDER — METFORMIN HYDROCHLORIDE 500 MG/1
500 TABLET, EXTENDED RELEASE ORAL
Qty: 30 TABLET | Refills: 5 | Status: SHIPPED | OUTPATIENT
Start: 2019-05-31 | End: 2019-10-13 | Stop reason: SDUPTHER

## 2019-05-31 RX ORDER — NYSTATIN 100000 U/G
OINTMENT TOPICAL
Qty: 15 G | Refills: 0 | Status: SHIPPED | OUTPATIENT
Start: 2019-05-31 | End: 2019-11-25

## 2019-05-31 ASSESSMENT — ENCOUNTER SYMPTOMS
SHORTNESS OF BREATH: 0
DIARRHEA: 0
SORE THROAT: 0
SINUS PRESSURE: 0
NAUSEA: 0
COUGH: 0
VOMITING: 0
BACK PAIN: 0

## 2019-05-31 NOTE — PROGRESS NOTES
Kosta Carlos is a 60 y.o. female who presents todayfor her medical conditions/complaints as noted below. Kosta Carlos is here today c/oOther (chapped lips x2 months)  Lips peeling for several months.    :     Visit Information    Have you changed or started any medications since your last visit including any over-the-counter medicines, vitamins, or herbal medicines? no   Are you having any side effects from any of your medications? -  no  Have you stopped taking any of your medications? Is so, why? -  no    Have you seen any other physician or provider since your last visit? No  Have you had any other diagnostic tests since your last visit? No  Have you been seen in the emergency room and/or had an admission to a hospital since we last saw you? No  Have you had your routine dental cleaning in the past 6 months? no    Have you activated your [employer] account? If not, what are your barriers?  Yes     Patient Care Team:  Darryl Schilling MD as PCP - General (Family Medicine)  Taye Puente MD as Consulting Physician    Medical History Review  Past Medical, Family, and Social History reviewed and does not contribute to the patient presenting condition    Health Maintenance   Topic Date Due    Hepatitis C screen  1955    HIV screen  04/09/1972    Shingles Vaccine (1 of 2) 04/09/2007    Diabetic retinal exam  08/18/2016    A1C test (Diabetic or Prediabetic)  05/28/2019    Diabetic foot exam  09/26/2019    Diabetic microalbuminuria test  09/26/2019    Cervical cancer screen  12/21/2019    Lipid screen  02/28/2020    Potassium monitoring  02/28/2020    Creatinine monitoring  02/28/2020    Breast cancer screen  02/28/2021    DTaP/Tdap/Td vaccine (2 - Td) 09/13/2023    Colon cancer screen colonoscopy  01/10/2027    Flu vaccine  Completed    Pneumococcal 0-64 years Vaccine  Completed           HPI        Past Medical History:   Diagnosis Date    Diabetes mellitus ([address]     Dyslipidemia daily.     Dispense:  15 g     Refill:  0    metFORMIN (GLUCOPHAGE-XR) 500 MG extended release tablet     Sig: Take 1 tablet by mouth daily (with breakfast)     Dispense:  30 tablet     Refill:  5     Alternate ointments. Add metformin to see if we can help improve her DM numbers.

## 2019-07-02 ENCOUNTER — TELEPHONE (OUTPATIENT)
Dept: FAMILY MEDICINE CLINIC | Facility: CLINIC | Age: 64
End: 2019-07-02

## 2019-07-11 ENCOUNTER — OFFICE VISIT (OUTPATIENT)
Dept: FAMILY MEDICINE CLINIC | Age: 64
End: 2019-07-11
Payer: COMMERCIAL

## 2019-07-11 VITALS
BODY MASS INDEX: 36.87 KG/M2 | WEIGHT: 235.4 LBS | HEART RATE: 68 BPM | DIASTOLIC BLOOD PRESSURE: 70 MMHG | SYSTOLIC BLOOD PRESSURE: 132 MMHG

## 2019-07-11 DIAGNOSIS — E11.9 TYPE 2 DIABETES MELLITUS WITHOUT COMPLICATION, UNSPECIFIED WHETHER LONG TERM INSULIN USE (HCC): Primary | ICD-10-CM

## 2019-07-11 DIAGNOSIS — E78.5 DYSLIPIDEMIA: ICD-10-CM

## 2019-07-11 PROCEDURE — 99213 OFFICE O/P EST LOW 20 MIN: CPT | Performed by: FAMILY MEDICINE

## 2019-07-11 ASSESSMENT — ENCOUNTER SYMPTOMS
BACK PAIN: 0
VOMITING: 0
SORE THROAT: 0
SHORTNESS OF BREATH: 0
COUGH: 0
NAUSEA: 0
SINUS PRESSURE: 0
DIARRHEA: 0

## 2019-07-11 NOTE — PROGRESS NOTES
HENT: Negative for congestion, sinus pressure and sore throat. Eyes: Negative for visual disturbance. Respiratory: Negative for cough and shortness of breath. Cardiovascular: Negative for chest pain and leg swelling. Gastrointestinal: Negative for diarrhea, nausea and vomiting. Genitourinary: Negative for dysuria, menstrual problem, urgency and vaginal discharge. Musculoskeletal: Negative for arthralgias, back pain and myalgias. Skin: Negative for rash. Neurological: Negative for weakness, numbness and headaches. Psychiatric/Behavioral: Negative for sleep disturbance.       :   /70   Pulse 68   Wt 235 lb 6.4 oz (106.8 kg)   BMI 36.87 kg/m²     Physical Exam   Constitutional: She is oriented to person, place, and time. She appears well-developed and well-nourished. No distress. HENT:   Head: Normocephalic and atraumatic. Mouth/Throat: No oropharyngeal exudate. Eyes: No scleral icterus. Neck: Neck supple. Carotid bruit is not present. Cardiovascular: Exam reveals no gallop and no friction rub. No murmur heard. Pulmonary/Chest: No respiratory distress. She has no wheezes. She has no rales. She exhibits no tenderness. Musculoskeletal: She exhibits no edema. Lymphadenopathy:     She has no cervical adenopathy. Neurological: She is alert and oriented to person, place, and time. No cranial nerve deficit. Skin: No rash noted. She is not diaphoretic. Psychiatric: She has a normal mood and affect. Her behavior is normal. Judgment and thought content normal.       Assessment:       Diagnosis Orders   1. Type 2 diabetes mellitus without complication, unspecified whether long term insulin use (HCC)  CBC Auto Differential    Comprehensive Metabolic Panel    Lipid Panel    Hemoglobin A1C   2. Dyslipidemia  CBC Auto Differential    Comprehensive Metabolic Panel    Lipid Panel         Plan:      Return in about 4 months (around 11/11/2019).     Orders Placed This Encounter Procedures    CBC Auto Differential     Standing Status:   Future     Standing Expiration Date:   7/11/2020    Comprehensive Metabolic Panel     Standing Status:   Future     Standing Expiration Date:   7/11/2020    Lipid Panel     Standing Status:   Future     Standing Expiration Date:   7/11/2020     Order Specific Question:   Is Patient Fasting?/# of Hours     Answer:   12 hour fast    Hemoglobin A1C     Standing Status:   Future     Standing Expiration Date:   7/11/2020     No orders of the defined types were placed in this encounter.   Her A1c has dropped from 11.5 to 10.4% and it has been about 6 weeks on the metformin so will continue current plan and repeat labs in four months with follow up post.

## 2019-07-19 RX ORDER — INSULIN HUMAN 100 [IU]/ML
INJECTION, SUSPENSION SUBCUTANEOUS
Qty: 135 ML | Refills: 0 | Status: SHIPPED | OUTPATIENT
Start: 2019-07-19 | End: 2019-10-27 | Stop reason: SDUPTHER

## 2019-10-14 RX ORDER — METFORMIN HYDROCHLORIDE 500 MG/1
TABLET, EXTENDED RELEASE ORAL
Qty: 90 TABLET | Refills: 2 | Status: SHIPPED | OUTPATIENT
Start: 2019-10-14 | End: 2020-08-13

## 2019-10-28 RX ORDER — INSULIN HUMAN 100 [IU]/ML
INJECTION, SUSPENSION SUBCUTANEOUS
Qty: 135 ML | Refills: 0 | Status: SHIPPED | OUTPATIENT
Start: 2019-10-28 | End: 2019-11-25 | Stop reason: DRUGHIGH

## 2019-11-18 LAB
ALBUMIN SERPL-MCNC: 4 G/DL
ALP BLD-CCNC: 62 U/L
ALT SERPL-CCNC: 43 U/L
ANION GAP SERPL CALCULATED.3IONS-SCNC: NORMAL MMOL/L
AST SERPL-CCNC: 33 U/L
AVERAGE GLUCOSE: NORMAL
BASOPHILS ABSOLUTE: 61 /ΜL
BASOPHILS RELATIVE PERCENT: 1 %
BILIRUB SERPL-MCNC: 0.5 MG/DL (ref 0.1–1.4)
BUN BLDV-MCNC: 14 MG/DL
CALCIUM SERPL-MCNC: 10.1 MG/DL
CHLORIDE BLD-SCNC: 99 MMOL/L
CHOLESTEROL, TOTAL: 127 MG/DL
CHOLESTEROL/HDL RATIO: 3.3
CO2: 29 MMOL/L
CREAT SERPL-MCNC: 0.79 MG/DL
EOSINOPHILS ABSOLUTE: 146 /ΜL
EOSINOPHILS RELATIVE PERCENT: 2.4 %
GFR CALCULATED: 80
GLUCOSE BLD-MCNC: 267 MG/DL
HBA1C MFR BLD: 10.4 %
HCT VFR BLD CALC: 43.9 % (ref 36–46)
HDLC SERPL-MCNC: 39 MG/DL (ref 35–70)
HEMOGLOBIN: 14.8 G/DL (ref 12–16)
LDL CHOLESTEROL CALCULATED: 69 MG/DL (ref 0–160)
LYMPHOCYTES ABSOLUTE: 2111 /ΜL
LYMPHOCYTES RELATIVE PERCENT: 34.6 %
MCH RBC QN AUTO: 31.6 PG
MCHC RBC AUTO-ENTMCNC: 33.7 G/DL
MCV RBC AUTO: 93.6 FL
MONOCYTES ABSOLUTE: 567 /ΜL
MONOCYTES RELATIVE PERCENT: 9.3 %
NEUTROPHILS ABSOLUTE: 3215 /ΜL
NEUTROPHILS RELATIVE PERCENT: 52.7 %
PDW BLD-RTO: NORMAL %
PLATELET # BLD: 13.3 K/ΜL
PMV BLD AUTO: NORMAL FL
POTASSIUM SERPL-SCNC: 4.4 MMOL/L
RBC # BLD: 4.69 10^6/ΜL
SODIUM BLD-SCNC: 136 MMOL/L
TOTAL PROTEIN: 6.9
TRIGL SERPL-MCNC: 108 MG/DL
VLDLC SERPL CALC-MCNC: NORMAL MG/DL
WBC # BLD: 6.1 10^3/ML

## 2019-11-25 ENCOUNTER — OFFICE VISIT (OUTPATIENT)
Dept: FAMILY MEDICINE CLINIC | Age: 64
End: 2019-11-25
Payer: COMMERCIAL

## 2019-11-25 VITALS
HEIGHT: 66 IN | SYSTOLIC BLOOD PRESSURE: 112 MMHG | WEIGHT: 233.5 LBS | OXYGEN SATURATION: 97 % | HEART RATE: 65 BPM | BODY MASS INDEX: 37.52 KG/M2 | DIASTOLIC BLOOD PRESSURE: 62 MMHG

## 2019-11-25 DIAGNOSIS — E11.9 TYPE 2 DIABETES MELLITUS WITHOUT COMPLICATION, UNSPECIFIED WHETHER LONG TERM INSULIN USE (HCC): Primary | ICD-10-CM

## 2019-11-25 DIAGNOSIS — E78.5 DYSLIPIDEMIA: ICD-10-CM

## 2019-11-25 PROCEDURE — 99213 OFFICE O/P EST LOW 20 MIN: CPT | Performed by: FAMILY MEDICINE

## 2019-11-25 PROCEDURE — 90688 IIV4 VACCINE SPLT 0.5 ML IM: CPT | Performed by: FAMILY MEDICINE

## 2019-11-25 PROCEDURE — 90471 IMMUNIZATION ADMIN: CPT | Performed by: FAMILY MEDICINE

## 2019-11-25 ASSESSMENT — ENCOUNTER SYMPTOMS
NAUSEA: 0
SHORTNESS OF BREATH: 0
COUGH: 0
SORE THROAT: 0
BACK PAIN: 0
VOMITING: 0
DIARRHEA: 0
SINUS PRESSURE: 0

## 2019-12-26 DIAGNOSIS — E78.5 DYSLIPIDEMIA: ICD-10-CM

## 2019-12-26 DIAGNOSIS — E11.9 TYPE 2 DIABETES MELLITUS WITHOUT COMPLICATION, UNSPECIFIED WHETHER LONG TERM INSULIN USE (HCC): ICD-10-CM

## 2020-02-10 RX ORDER — INSULIN HUMAN 100 [IU]/ML
INJECTION, SUSPENSION SUBCUTANEOUS
Qty: 135 ML | Refills: 3 | Status: SHIPPED | OUTPATIENT
Start: 2020-02-10 | End: 2021-06-18 | Stop reason: SDUPTHER

## 2020-02-10 RX ORDER — FENOFIBRATE 160 MG/1
TABLET ORAL
Qty: 90 TABLET | Refills: 3 | Status: SHIPPED | OUTPATIENT
Start: 2020-02-10 | End: 2021-02-01 | Stop reason: SDUPTHER

## 2020-02-10 RX ORDER — LISINOPRIL 2.5 MG/1
TABLET ORAL
Qty: 90 TABLET | Refills: 3 | Status: SHIPPED | OUTPATIENT
Start: 2020-02-10 | End: 2021-03-29

## 2020-02-19 RX ORDER — ASPIRIN 81 MG/1
TABLET ORAL
Qty: 90 TABLET | Refills: 0 | Status: SHIPPED | OUTPATIENT
Start: 2020-02-19 | End: 2020-05-26

## 2020-03-06 ENCOUNTER — OFFICE VISIT (OUTPATIENT)
Dept: FAMILY MEDICINE CLINIC | Age: 65
End: 2020-03-06
Payer: COMMERCIAL

## 2020-03-06 ENCOUNTER — NURSE TRIAGE (OUTPATIENT)
Dept: OTHER | Facility: CLINIC | Age: 65
End: 2020-03-06

## 2020-03-06 VITALS
BODY MASS INDEX: 37.12 KG/M2 | WEIGHT: 230 LBS | DIASTOLIC BLOOD PRESSURE: 70 MMHG | OXYGEN SATURATION: 95 % | SYSTOLIC BLOOD PRESSURE: 116 MMHG | HEART RATE: 87 BPM

## 2020-03-06 PROCEDURE — 99213 OFFICE O/P EST LOW 20 MIN: CPT | Performed by: FAMILY MEDICINE

## 2020-03-06 RX ORDER — AZITHROMYCIN 250 MG/1
TABLET, FILM COATED ORAL
Qty: 1 PACKET | Refills: 0 | Status: SHIPPED | OUTPATIENT
Start: 2020-03-06 | End: 2021-03-29

## 2020-03-06 RX ORDER — PREDNISONE 20 MG/1
TABLET ORAL
Qty: 20 TABLET | Refills: 0 | Status: SHIPPED | OUTPATIENT
Start: 2020-03-06 | End: 2021-03-29

## 2020-03-06 ASSESSMENT — PATIENT HEALTH QUESTIONNAIRE - PHQ9
1. LITTLE INTEREST OR PLEASURE IN DOING THINGS: 0
2. FEELING DOWN, DEPRESSED OR HOPELESS: 0
SUM OF ALL RESPONSES TO PHQ9 QUESTIONS 1 & 2: 0
SUM OF ALL RESPONSES TO PHQ QUESTIONS 1-9: 0
SUM OF ALL RESPONSES TO PHQ QUESTIONS 1-9: 0

## 2020-03-06 ASSESSMENT — ENCOUNTER SYMPTOMS
SORE THROAT: 0
VOMITING: 0
SHORTNESS OF BREATH: 1
BACK PAIN: 0
DIARRHEA: 0
SINUS PRESSURE: 0
NAUSEA: 0
COUGH: 1

## 2020-03-06 NOTE — PROGRESS NOTES
Danilo Gaytan is a 60 y.o. female who presents todayfor her medical conditions/complaints as noted below. Danilo Gaytan is here today c/oCough and Shortness of Breath      :     HPI    Cough and dyspnea, for a few weeks. Past Medical History:   Diagnosis Date    Diabetes mellitus ([address]     Dyslipidemia     Urticaria, chronic       No past surgical history on file. Family History   Problem Relation Age of Onset    High Cholesterol Father     High Blood Pressure Father     Heart Disease Father     Diabetes Father     High Cholesterol Sister     High Blood Pressure Sister      Social History     Tobacco Use    Smoking status: Former Smoker     Packs/day: 1.00     Years: 21.00     Pack years: 21.00     Types: Cigarettes     Last attempt to quit: 2012     Years since quittin.7    Smokeless tobacco: Never Used   Substance Use Topics    Alcohol use: No      Current Outpatient Medications   Medication Sig Dispense Refill    azithromycin (ZITHROMAX Z-RADHA) 250 MG tablet Take 2 pills on day one then one pill daily til gone. 1 packet 0    predniSONE (DELTASONE) 20 MG tablet Take 3 tabs daily for 3 days, then 2 a day for 3 days, then 1 a day for 3 days, then 1/2 a day for 3 days.  20 tablet 0    aspirin (ASPIRIN LOW DOSE) 81 MG EC tablet TAKE 1 TABLET BY MOUTH ONCE DAILY 90 tablet 0    lisinopril (PRINIVIL;ZESTRIL) 2.5 MG tablet TAKE 1 TABLET BY MOUTH  DAILY 90 tablet 3    fenofibrate 160 MG tablet TAKE 1 TABLET BY MOUTH ONCE DAILY 90 tablet 3    HUMULIN N KWIKPEN 100 UNIT/ML injection pen INJECT SUBCUTANEOUSLY 80  UNITS IN THE MORNING AND 60 UNITS IN THE EVENING 135 mL 3    metoprolol tartrate (LOPRESSOR) 25 MG tablet TAKE 1 TABLET BY MOUTH  DAILY 90 tablet 3    metFORMIN (GLUCOPHAGE-XR) 500 MG extended release tablet TAKE 1 TABLET BY MOUTH  EVERY DAY 90 tablet 2    Insulin Pen Needle 31G X 8 MM MISC 1 each by Does not apply route 2 times daily 200 each 3    atorvastatin (LIPITOR) 80 MG tablet TAKE 1 TABLET BY MOUTH ONCE DAILY 90 tablet 3     No current facility-administered medications for this visit. Allergies   Allergen Reactions    Sulfa Antibiotics          Subjective:   Review of Systems   Constitutional: Negative for chills, diaphoresis and fever. HENT: Negative for congestion, sinus pressure and sore throat. Eyes: Negative for visual disturbance. Respiratory: Positive for cough and shortness of breath. Cardiovascular: Negative for chest pain and leg swelling. Gastrointestinal: Negative for diarrhea, nausea and vomiting. Genitourinary: Negative for dysuria, menstrual problem, urgency and vaginal discharge. Musculoskeletal: Negative for arthralgias, back pain and myalgias. Skin: Negative for rash. Neurological: Negative for weakness, numbness and headaches. Psychiatric/Behavioral: Negative for sleep disturbance.       :   /70   Pulse 87   Wt 230 lb (104.3 kg)   SpO2 95%   BMI 37.12 kg/m²     Physical Exam  Constitutional:       General: She is not in acute distress. Appearance: She is well-developed. She is not diaphoretic. HENT:      Head: Normocephalic and atraumatic. Mouth/Throat:      Pharynx: No oropharyngeal exudate. Eyes:      General: No scleral icterus. Neck:      Musculoskeletal: Neck supple. Vascular: No carotid bruit. Cardiovascular:      Heart sounds: No murmur. No friction rub. No gallop. Pulmonary:      Breath sounds: No wheezing or rales. Comments: Increase cough with deep inspiration but breath sounds are clear. Chest:      Chest wall: No tenderness. Lymphadenopathy:      Cervical: No cervical adenopathy. Skin:     Findings: No rash. Neurological:      Mental Status: She is alert and oriented to person, place, and time. Cranial Nerves: No cranial nerve deficit. Psychiatric:         Behavior: Behavior normal.         Thought Content:  Thought content normal.         Judgment: Judgment normal.

## 2020-03-26 ENCOUNTER — TELEPHONE (OUTPATIENT)
Dept: FAMILY MEDICINE CLINIC | Age: 65
End: 2020-03-26

## 2020-03-26 RX ORDER — FLUTICASONE PROPIONATE 110 UG/1
1 AEROSOL, METERED RESPIRATORY (INHALATION) 2 TIMES DAILY
Qty: 1 INHALER | Refills: 3 | Status: SHIPPED | OUTPATIENT
Start: 2020-03-26 | End: 2021-03-29

## 2020-03-26 RX ORDER — LEVOFLOXACIN 500 MG/1
500 TABLET, FILM COATED ORAL DAILY
Qty: 10 TABLET | Refills: 0 | Status: SHIPPED | OUTPATIENT
Start: 2020-03-26 | End: 2020-04-05

## 2020-03-26 RX ORDER — PREDNISONE 20 MG/1
20 TABLET ORAL 2 TIMES DAILY
Qty: 10 TABLET | Refills: 0 | Status: SHIPPED | OUTPATIENT
Start: 2020-03-26 | End: 2020-03-31

## 2020-05-26 RX ORDER — ASPIRIN 81 MG/1
TABLET ORAL
Qty: 90 TABLET | Refills: 0 | Status: SHIPPED | OUTPATIENT
Start: 2020-05-26 | End: 2020-08-14 | Stop reason: SDUPTHER

## 2020-06-17 ENCOUNTER — TELEPHONE (OUTPATIENT)
Dept: FAMILY MEDICINE CLINIC | Age: 65
End: 2020-06-17

## 2020-06-17 RX ORDER — FLUCONAZOLE 150 MG/1
150 TABLET ORAL ONCE
Qty: 1 TABLET | Refills: 0 | Status: SHIPPED | OUTPATIENT
Start: 2020-06-17 | End: 2022-03-28 | Stop reason: SDUPTHER

## 2020-06-17 NOTE — TELEPHONE ENCOUNTER
Patient is calling stating that since the 9th she has been having vaginal itching and burning. It has happened twice in the past 5 years and she was giving 1 pill and it went away. She is asking if she could have that pill again or does she need an appointment?

## 2020-07-06 LAB
ALBUMIN SERPL-MCNC: 4.2 G/DL
ALP BLD-CCNC: 64 U/L
ALT SERPL-CCNC: 50 U/L
ANION GAP SERPL CALCULATED.3IONS-SCNC: NORMAL MMOL/L
AST SERPL-CCNC: 49 U/L
AVERAGE GLUCOSE: NORMAL
BASOPHILS ABSOLUTE: 43 /ΜL
BASOPHILS RELATIVE PERCENT: 0.6 %
BILIRUB SERPL-MCNC: 0.6 MG/DL (ref 0.1–1.4)
BUN BLDV-MCNC: 21 MG/DL
CALCIUM SERPL-MCNC: 10.3 MG/DL
CHLORIDE BLD-SCNC: 99 MMOL/L
CHOLESTEROL, TOTAL: 138 MG/DL
CHOLESTEROL/HDL RATIO: 3.8
CO2: 28 MMOL/L
CREAT SERPL-MCNC: 0.84 MG/DL
EOSINOPHILS ABSOLUTE: 173 /ΜL
EOSINOPHILS RELATIVE PERCENT: 2.4 %
GFR CALCULATED: 74
GLUCOSE BLD-MCNC: 294 MG/DL
HBA1C MFR BLD: 9.7 %
HCT VFR BLD CALC: 46.4 % (ref 36–46)
HDLC SERPL-MCNC: 36 MG/DL (ref 35–70)
HEMOGLOBIN: 15.2 G/DL (ref 12–16)
LDL CHOLESTEROL CALCULATED: 77 MG/DL (ref 0–160)
LYMPHOCYTES ABSOLUTE: 2102 /ΜL
LYMPHOCYTES RELATIVE PERCENT: 29.2 %
MCH RBC QN AUTO: 31.5 PG
MCHC RBC AUTO-ENTMCNC: 32.8 G/DL
MCV RBC AUTO: 96.3 FL
MONOCYTES ABSOLUTE: 698 /ΜL
MONOCYTES RELATIVE PERCENT: 9.7 %
NEUTROPHILS ABSOLUTE: 4183 /ΜL
NEUTROPHILS RELATIVE PERCENT: 58.1 %
PDW BLD-RTO: 12.2 %
PLATELET # BLD: 187 K/ΜL
PMV BLD AUTO: 12.8 FL
POTASSIUM SERPL-SCNC: 4.9 MMOL/L
RBC # BLD: 4.82 10^6/ΜL
SODIUM BLD-SCNC: 135 MMOL/L
TOTAL PROTEIN: 6.8
TRIGL SERPL-MCNC: 146 MG/DL
VLDLC SERPL CALC-MCNC: 102 MG/DL
WBC # BLD: 7.2 10^3/ML

## 2020-07-07 ENCOUNTER — TELEPHONE (OUTPATIENT)
Dept: FAMILY MEDICINE CLINIC | Age: 65
End: 2020-07-07

## 2020-07-08 ENCOUNTER — HOSPITAL ENCOUNTER (OUTPATIENT)
Dept: MAMMOGRAPHY | Age: 65
Discharge: HOME | End: 2020-07-10
Payer: COMMERCIAL

## 2020-07-08 PROCEDURE — 77063 BREAST TOMOSYNTHESIS BI: CPT

## 2020-08-13 RX ORDER — METFORMIN HYDROCHLORIDE 500 MG/1
TABLET, EXTENDED RELEASE ORAL
Qty: 90 TABLET | Refills: 3 | Status: SHIPPED | OUTPATIENT
Start: 2020-08-13 | End: 2021-06-17 | Stop reason: ALTCHOICE

## 2020-08-13 NOTE — TELEPHONE ENCOUNTER
Next Visit Date:  No future appointments.     Health Maintenance   Topic Date Due    Hepatitis C screen  1955    HIV screen  04/09/1972    Shingles Vaccine (1 of 2) 04/09/2007    Diabetic retinal exam  08/18/2016    Diabetic foot exam  09/26/2019    Diabetic microalbuminuria test  09/26/2019    Cervical cancer screen  12/21/2019    Flu vaccine (1) 09/01/2020    A1C test (Diabetic or Prediabetic)  10/06/2020    Lipid screen  07/06/2021    Potassium monitoring  07/06/2021    Creatinine monitoring  07/06/2021    Breast cancer screen  07/08/2022    DTaP/Tdap/Td vaccine (2 - Td) 09/13/2023    Colon cancer screen colonoscopy  01/10/2027    Pneumococcal 0-64 years Vaccine  Completed    Hepatitis A vaccine  Aged Out    Hib vaccine  Aged Out    Meningococcal (ACWY) vaccine  Aged Out       Hemoglobin A1C (%)   Date Value   07/06/2020 9.7   11/18/2019 10.4   02/28/2019 11.5 (H)             ( goal A1C is < 7)   No results found for: LABMICR  LDL Calculated (mg/dL)   Date Value   07/06/2020 77   11/18/2019 69       (goal LDL is <100)   AST (U/L)   Date Value   07/06/2020 49     ALT (U/L)   Date Value   07/06/2020 50     BUN (mg/dL)   Date Value   07/06/2020 21     BP Readings from Last 3 Encounters:   03/06/20 116/70   11/25/19 112/62   07/11/19 132/70          (goal 120/80)    All Future Testing planned in [facility]  Lab Frequency Next Occurrence               Patient Active Problem List:     Dyslipidemia     Diabetes mellitus (HCC)     Urticaria, chronic     ASHD (arteriosclerotic heart disease)

## 2020-08-14 RX ORDER — ASPIRIN 81 MG/1
TABLET ORAL
Qty: 90 TABLET | Refills: 0 | Status: SHIPPED | OUTPATIENT
Start: 2020-08-14 | End: 2020-11-03

## 2020-09-30 ENCOUNTER — OFFICE VISIT (OUTPATIENT)
Dept: FAMILY MEDICINE CLINIC | Age: 65
End: 2020-09-30
Payer: COMMERCIAL

## 2020-09-30 VITALS
HEIGHT: 66 IN | DIASTOLIC BLOOD PRESSURE: 80 MMHG | OXYGEN SATURATION: 96 % | WEIGHT: 233.8 LBS | SYSTOLIC BLOOD PRESSURE: 120 MMHG | BODY MASS INDEX: 37.57 KG/M2 | HEART RATE: 92 BPM | TEMPERATURE: 97.5 F

## 2020-09-30 PROCEDURE — 90686 IIV4 VACC NO PRSV 0.5 ML IM: CPT | Performed by: FAMILY MEDICINE

## 2020-09-30 PROCEDURE — 90471 IMMUNIZATION ADMIN: CPT | Performed by: FAMILY MEDICINE

## 2020-09-30 PROCEDURE — 99213 OFFICE O/P EST LOW 20 MIN: CPT | Performed by: FAMILY MEDICINE

## 2020-09-30 SDOH — ECONOMIC STABILITY: INCOME INSECURITY: HOW HARD IS IT FOR YOU TO PAY FOR THE VERY BASICS LIKE FOOD, HOUSING, MEDICAL CARE, AND HEATING?: NOT HARD AT ALL

## 2020-09-30 SDOH — ECONOMIC STABILITY: TRANSPORTATION INSECURITY
IN THE PAST 12 MONTHS, HAS LACK OF TRANSPORTATION KEPT YOU FROM MEETINGS, WORK, OR FROM GETTING THINGS NEEDED FOR DAILY LIVING?: NO

## 2020-09-30 SDOH — ECONOMIC STABILITY: FOOD INSECURITY: WITHIN THE PAST 12 MONTHS, YOU WORRIED THAT YOUR FOOD WOULD RUN OUT BEFORE YOU GOT MONEY TO BUY MORE.: NEVER TRUE

## 2020-09-30 SDOH — ECONOMIC STABILITY: TRANSPORTATION INSECURITY
IN THE PAST 12 MONTHS, HAS THE LACK OF TRANSPORTATION KEPT YOU FROM MEDICAL APPOINTMENTS OR FROM GETTING MEDICATIONS?: NO

## 2020-09-30 SDOH — ECONOMIC STABILITY: FOOD INSECURITY: WITHIN THE PAST 12 MONTHS, THE FOOD YOU BOUGHT JUST DIDN'T LAST AND YOU DIDN'T HAVE MONEY TO GET MORE.: NEVER TRUE

## 2020-09-30 ASSESSMENT — ENCOUNTER SYMPTOMS
NAUSEA: 0
VOMITING: 0
COUGH: 0
SORE THROAT: 0
DIARRHEA: 0
BACK PAIN: 0
SHORTNESS OF BREATH: 0
SINUS PRESSURE: 0

## 2020-09-30 ASSESSMENT — PATIENT HEALTH QUESTIONNAIRE - PHQ9
SUM OF ALL RESPONSES TO PHQ QUESTIONS 1-9: 0
1. LITTLE INTEREST OR PLEASURE IN DOING THINGS: 0
2. FEELING DOWN, DEPRESSED OR HOPELESS: 0
SUM OF ALL RESPONSES TO PHQ9 QUESTIONS 1 & 2: 0
SUM OF ALL RESPONSES TO PHQ QUESTIONS 1-9: 0

## 2020-09-30 NOTE — PROGRESS NOTES
Avinash Walker is a 63 y.o. female who presents todayfor her medical conditions/complaints as noted below. Avinash Walker is here today c/oArthritis      :     HPI    Hip pain and hand pains. Wants tx options. Past Medical History:   Diagnosis Date    Diabetes mellitus ([address]     Dyslipidemia     Urticaria, chronic       No past surgical history on file. Family History   Problem Relation Age of Onset    High Cholesterol Father     High Blood Pressure Father     Heart Disease Father     Diabetes Father     High Cholesterol Sister     High Blood Pressure Sister      Social History     Tobacco Use    Smoking status: Former Smoker     Packs/day: 1.00     Years: 21.00     Pack years: 21.00     Types: Cigarettes     Last attempt to quit: 2012     Years since quittin.2    Smokeless tobacco: Never Used   Substance Use Topics    Alcohol use: No      Current Outpatient Medications   Medication Sig Dispense Refill    diclofenac sodium (VOLTAREN) 1 % GEL Apply 4 g topically 4 times daily 4 Tube 3    aspirin (ASPIRIN LOW DOSE) 81 MG EC tablet TAKE 1 TABLET BY MOUTH ONCE DAILY 90 tablet 0    metFORMIN (GLUCOPHAGE-XR) 500 MG extended release tablet TAKE 1 TABLET BY MOUTH  EVERY DAY 90 tablet 3    atorvastatin (LIPITOR) 80 MG tablet TAKE 1 TABLET BY MOUTH ONCE DAILY 90 tablet 3    fluticasone (FLOVENT HFA) 110 MCG/ACT inhaler Inhale 1 puff into the lungs 2 times daily 1 Inhaler 3    azithromycin (ZITHROMAX Z-RADHA) 250 MG tablet Take 2 pills on day one then one pill daily til gone. 1 packet 0    predniSONE (DELTASONE) 20 MG tablet Take 3 tabs daily for 3 days, then 2 a day for 3 days, then 1 a day for 3 days, then 1/2 a day for 3 days.  20 tablet 0    lisinopril (PRINIVIL;ZESTRIL) 2.5 MG tablet TAKE 1 TABLET BY MOUTH  DAILY 90 tablet 3    fenofibrate 160 MG tablet TAKE 1 TABLET BY MOUTH ONCE DAILY 90 tablet 3    HUMULIN N KWIKPEN 100 UNIT/ML injection pen INJECT SUBCUTANEOUSLY 80  UNITS IN

## 2021-02-01 DIAGNOSIS — E78.5 DYSLIPIDEMIA: Primary | ICD-10-CM

## 2021-02-01 RX ORDER — FENOFIBRATE 160 MG/1
160 TABLET ORAL DAILY
Qty: 90 TABLET | Refills: 1 | Status: SHIPPED | OUTPATIENT
Start: 2021-02-01 | End: 2021-02-05 | Stop reason: SDUPTHER

## 2021-02-01 NOTE — TELEPHONE ENCOUNTER
Luisito Warren is calling to request a refill on the following medication(s):    Medication Request:  Requested Prescriptions     Pending Prescriptions Disp Refills    fenofibrate (TRIGLIDE) 160 MG tablet 90 tablet 3       Last Visit Date (If Applicable):  Visit date not found    Next Visit Date:    3/29/2021

## 2021-02-05 ENCOUNTER — TELEPHONE (OUTPATIENT)
Dept: FAMILY MEDICINE CLINIC | Age: 66
End: 2021-02-05

## 2021-02-05 DIAGNOSIS — E78.5 DYSLIPIDEMIA: ICD-10-CM

## 2021-02-05 RX ORDER — FENOFIBRATE 160 MG/1
160 TABLET ORAL DAILY
Qty: 90 TABLET | Refills: 1 | Status: SHIPPED | OUTPATIENT
Start: 2021-02-05 | End: 2021-07-07

## 2021-03-24 LAB
ALBUMIN SERPL-MCNC: 3.9 G/DL
ALP BLD-CCNC: 68 U/L
ALT SERPL-CCNC: 38 U/L
ANION GAP SERPL CALCULATED.3IONS-SCNC: NORMAL MMOL/L
AST SERPL-CCNC: 31 U/L
AVERAGE GLUCOSE: 267
BASOPHILS ABSOLUTE: 70 /ΜL
BASOPHILS RELATIVE PERCENT: 0.9 %
BILIRUB SERPL-MCNC: 0.7 MG/DL (ref 0.1–1.4)
BUN BLDV-MCNC: 17 MG/DL
CALCIUM SERPL-MCNC: 10.1 MG/DL
CHLORIDE BLD-SCNC: 102 MMOL/L
CHOLESTEROL, TOTAL: 114 MG/DL
CHOLESTEROL/HDL RATIO: 3
CO2: 33 MMOL/L
CREAT SERPL-MCNC: 0.79 MG/DL
EOSINOPHILS ABSOLUTE: 265 /ΜL
EOSINOPHILS RELATIVE PERCENT: 3.4 %
GFR CALCULATED: 80
GLUCOSE BLD-MCNC: 267 MG/DL
HBA1C MFR BLD: 10.5 %
HCT VFR BLD CALC: 46 % (ref 36–46)
HDLC SERPL-MCNC: 38 MG/DL (ref 35–70)
HEMOGLOBIN: 15.3 G/DL (ref 12–16)
LDL CHOLESTEROL CALCULATED: 55 MG/DL (ref 0–160)
LYMPHOCYTES ABSOLUTE: 2519 /ΜL
LYMPHOCYTES RELATIVE PERCENT: 32.3 %
MCH RBC QN AUTO: 31.3 PG
MCHC RBC AUTO-ENTMCNC: 33.3 G/DL
MCV RBC AUTO: 94.1 FL
MONOCYTES ABSOLUTE: 694 /ΜL
MONOCYTES RELATIVE PERCENT: 8.9 %
NEUTROPHILS ABSOLUTE: 4251 /ΜL
NEUTROPHILS RELATIVE PERCENT: 54.5 %
NONHDLC SERPL-MCNC: 76 MG/DL
PDW BLD-RTO: 11.9 %
PLATELET # BLD: 193 K/ΜL
PMV BLD AUTO: 12.2 FL
POTASSIUM SERPL-SCNC: 5.1 MMOL/L
RBC # BLD: 4.89 10^6/ΜL
SODIUM BLD-SCNC: 140 MMOL/L
TOTAL PROTEIN: 6.9
TRIGL SERPL-MCNC: 126 MG/DL
VLDLC SERPL CALC-MCNC: NORMAL MG/DL
WBC # BLD: 7.8 10^3/ML

## 2021-03-29 ENCOUNTER — OFFICE VISIT (OUTPATIENT)
Dept: FAMILY MEDICINE CLINIC | Age: 66
End: 2021-03-29
Payer: COMMERCIAL

## 2021-03-29 VITALS
OXYGEN SATURATION: 96 % | TEMPERATURE: 98.2 F | SYSTOLIC BLOOD PRESSURE: 126 MMHG | WEIGHT: 230 LBS | BODY MASS INDEX: 37.14 KG/M2 | DIASTOLIC BLOOD PRESSURE: 70 MMHG | HEART RATE: 73 BPM

## 2021-03-29 DIAGNOSIS — E78.5 DYSLIPIDEMIA: ICD-10-CM

## 2021-03-29 DIAGNOSIS — R80.9 MICROALBUMINURIA: ICD-10-CM

## 2021-03-29 DIAGNOSIS — I25.10 ASHD (ARTERIOSCLEROTIC HEART DISEASE): ICD-10-CM

## 2021-03-29 DIAGNOSIS — R74.8 ELEVATED LIVER ENZYMES: ICD-10-CM

## 2021-03-29 DIAGNOSIS — M15.9 PRIMARY OSTEOARTHRITIS INVOLVING MULTIPLE JOINTS: ICD-10-CM

## 2021-03-29 DIAGNOSIS — E11.9 TYPE 2 DIABETES MELLITUS WITHOUT COMPLICATION, UNSPECIFIED WHETHER LONG TERM INSULIN USE (HCC): ICD-10-CM

## 2021-03-29 DIAGNOSIS — Z00.00 HEALTH CARE MAINTENANCE: ICD-10-CM

## 2021-03-29 DIAGNOSIS — E11.9 TYPE 2 DIABETES MELLITUS WITHOUT COMPLICATION, UNSPECIFIED WHETHER LONG TERM INSULIN USE (HCC): Primary | ICD-10-CM

## 2021-03-29 PROBLEM — M15.0 PRIMARY OSTEOARTHRITIS INVOLVING MULTIPLE JOINTS: Status: ACTIVE | Noted: 2021-03-29

## 2021-03-29 LAB
CREATININE URINE POCT: 50
MICROALBUMIN/CREAT 24H UR: 10 MG/G{CREAT}
MICROALBUMIN/CREAT UR-RTO: NORMAL

## 2021-03-29 PROCEDURE — 99214 OFFICE O/P EST MOD 30 MIN: CPT | Performed by: FAMILY MEDICINE

## 2021-03-29 PROCEDURE — 82044 UR ALBUMIN SEMIQUANTITATIVE: CPT | Performed by: FAMILY MEDICINE

## 2021-03-29 RX ORDER — LISINOPRIL 2.5 MG/1
5 TABLET ORAL DAILY
Qty: 90 TABLET | Refills: 3
Start: 2021-03-29 | End: 2021-07-15 | Stop reason: SDUPTHER

## 2021-03-29 ASSESSMENT — ENCOUNTER SYMPTOMS
ALLERGIC/IMMUNOLOGIC NEGATIVE: 1
RESPIRATORY NEGATIVE: 1
CHEST TIGHTNESS: 0
SHORTNESS OF BREATH: 0
VISUAL CHANGE: 0
BLURRED VISION: 0
GASTROINTESTINAL NEGATIVE: 1
EYES NEGATIVE: 1

## 2021-03-29 ASSESSMENT — PATIENT HEALTH QUESTIONNAIRE - PHQ9
SUM OF ALL RESPONSES TO PHQ9 QUESTIONS 1 & 2: 0
SUM OF ALL RESPONSES TO PHQ QUESTIONS 1-9: 0
SUM OF ALL RESPONSES TO PHQ QUESTIONS 1-9: 0

## 2021-03-29 NOTE — PROGRESS NOTES
cardiologist.     Urine microalbuminuria - most likely 2/2 to uncontroled diabetes. On Lisinopril    Diabetes  She presents for her follow-up diabetic visit. She has type 2 diabetes mellitus. Her disease course has been worsening. There are no hypoglycemic associated symptoms. Pertinent negatives for hypoglycemia include no dizziness. There are no diabetic associated symptoms. Pertinent negatives for diabetes include no blurred vision, no chest pain, no fatigue, no foot paresthesias, no foot ulcerations, no polydipsia, no polyphagia, no polyuria, no visual change, no weakness and no weight loss. Symptoms are stable. Current diabetic treatment includes insulin injections and oral agent (monotherapy). She is compliant with treatment most of the time. Her weight is increasing steadily. Her home blood glucose trend is increasing steadily. An ACE inhibitor/angiotensin II receptor blocker is being taken. Hyperlipidemia  This is a chronic problem. The current episode started more than 1 year ago. Exacerbating diseases include diabetes, liver disease and obesity. She has no history of chronic renal disease, hypothyroidism or nephrotic syndrome. Pertinent negatives include no chest pain, focal sensory loss, focal weakness, leg pain, myalgias or shortness of breath. Current antihyperlipidemic treatment includes statins and fibric acid derivatives. The current treatment provides moderate improvement of lipids. Coronary Artery Disease  Presents for follow-up visit. Pertinent negatives include no chest pain, chest pressure, chest tightness, dizziness, leg swelling, muscle weakness, palpitations, shortness of breath or weight gain. Risk factors include hyperlipidemia and obesity. The symptoms have been stable. Compliance with diet is variable. Compliance with exercise is variable. Compliance with medications is good. Review of Systems   Review of Systems   Constitutional: Negative.   Negative for fatigue, weight gain and weight loss. HENT: Negative. Eyes: Negative. Negative for blurred vision. Respiratory: Negative. Negative for chest tightness and shortness of breath. Cardiovascular: Negative. Negative for chest pain, palpitations and leg swelling. Gastrointestinal: Negative. Endocrine: Negative. Negative for polydipsia, polyphagia and polyuria. Genitourinary: Negative. Musculoskeletal: Negative. Negative for myalgias and muscle weakness. Skin: Negative. Allergic/Immunologic: Negative. Neurological: Negative. Negative for dizziness, focal weakness and weakness. Hematological: Negative. Psychiatric/Behavioral: Negative. All other systems reviewed and are negative. Medications     Current Outpatient Medications   Medication Sig Dispense Refill    lisinopril (PRINIVIL;ZESTRIL) 2.5 MG tablet Take 2 tablets by mouth daily 90 tablet 3    fenofibrate (TRIGLIDE) 160 MG tablet Take 1 tablet by mouth daily 90 tablet 1    aspirin (ASPIRIN LOW DOSE) 81 MG EC tablet TAKE 1 TABLET BY MOUTH ONCE DAILY 90 tablet 3    diclofenac sodium (VOLTAREN) 1 % GEL Apply 4 g topically 4 times daily 4 Tube 3    metFORMIN (GLUCOPHAGE-XR) 500 MG extended release tablet TAKE 1 TABLET BY MOUTH  EVERY DAY 90 tablet 3    atorvastatin (LIPITOR) 80 MG tablet TAKE 1 TABLET BY MOUTH ONCE DAILY 90 tablet 3    HUMULIN N KWIKPEN 100 UNIT/ML injection pen INJECT SUBCUTANEOUSLY 80  UNITS IN THE MORNING AND 60 UNITS IN THE EVENING 135 mL 3    metoprolol tartrate (LOPRESSOR) 25 MG tablet TAKE 1 TABLET BY MOUTH  DAILY 90 tablet 3     No current facility-administered medications for this visit. Past History    Past Medical History:   has a past medical history of Diabetes mellitus ([address] Dyslipidemia, and Urticaria, chronic. Social History:   reports that she quit smoking about 8 years ago. Her smoking use included cigarettes. She has a 21.00 pack-year smoking history.  She has never used smokeless tobacco. She reports that she does not drink alcohol or use drugs. Family History:   Family History   Problem Relation Age of Onset    High Cholesterol Father     High Blood Pressure Father     Heart Disease Father     Diabetes Father     High Cholesterol Sister     High Blood Pressure Sister        Surgical History: History reviewed. No pertinent surgical history. Physical Examination      Vitals:  /70   Pulse 73   Temp 98.2 °F (36.8 °C)   Wt 230 lb (104.3 kg)   SpO2 96%   BMI 37.14 kg/m²     Physical Exam  Vitals signs and nursing note reviewed. Constitutional:       General: She is not in acute distress. Appearance: Normal appearance. She is obese. She is not ill-appearing, toxic-appearing or diaphoretic. HENT:      Head: Normocephalic and atraumatic. Eyes:      General: No scleral icterus. Right eye: No discharge. Left eye: No discharge. Extraocular Movements: Extraocular movements intact. Conjunctiva/sclera: Conjunctivae normal.   Cardiovascular:      Rate and Rhythm: Normal rate and regular rhythm. Pulses: Normal pulses. Heart sounds: Normal heart sounds. No murmur. No friction rub. No gallop. Pulmonary:      Effort: Pulmonary effort is normal. No respiratory distress. Breath sounds: Normal breath sounds. No stridor. No wheezing, rhonchi or rales. Chest:      Chest wall: No tenderness. Neurological:      Mental Status: She is alert and oriented to person, place, and time. Mental status is at baseline. Psychiatric:         Mood and Affect: Mood normal.         Behavior: Behavior normal.         Thought Content:  Thought content normal.         Judgment: Judgment normal.     Visual inspection:  Deformity/amputation: absent  Skin lesions/pre-ulcerative calluses: absent  Edema: right- negative, left- negative    Sensory exam:  Monofilament sensation: abnormal - one positive spot per foot  (minimum of 5 random plantar locations tested, avoiding callused areas - > 1 area with absence of sensation is + for neuropathy)    Plus at least one of the following:  Pulses: normal,       Labs/Imaging/Diagnostics   Labs:  Hemoglobin A1C   Date Value Ref Range Status   03/24/2021 10.5 % Final       Imaging Last 24 Hours:  [facility] DIGITAL SCREEN SELF REFERRAL W OR WO CAD BILATERAL  Narrative: EXAMINATION:  SCREENING DIGITAL BILATERAL  MAMMOGRAM WITH TOMOSYNTHESIS, 7/8/2020    TECHNIQUE:  Screening mammography of the bilateral breasts was performed with  tomosynthesis. 2D standard and 3D tomosynthesis combination imaging  performed through both breasts in the MLO and CC projection. Computer aided  detection was utilized in the interpretation of this exam.    COMPARISON:  02/28/2019, 12/22/2016    HISTORY:  Screening. FINDINGS:  The breast tissue is fatty replaced. There is no suspicious mass, suspicious  microcalcification, or area of architectural distortion. Impression: No mammographic evidence of malignancy. BI-RADS 1    BIRADS:  BIRADS - CATEGORY 1    Negative, no evidence of malignancy. Normal interval follow-up is  recommended in 12 months. OVERALL ASSESSMENT - NEGATIVE    A letter of notification will be sent to the patient regarding the results. The [employer] recommends annual mammograms for women 40  years and older.

## 2021-03-29 NOTE — PATIENT INSTRUCTIONS
Patient Education        Counting Carbohydrates: Care Instructions  Your Care Instructions     You don't have to eat special foods when you have diabetes. You just have to be careful to eat healthy foods. Carbohydrates (carbs) raise blood sugar higher and quicker than any other nutrient. Carbs are found in desserts, breads and cereals, and fruit. They're also in starchy vegetables. These include potatoes, corn, and grains such as rice and pasta. Carbs are also in milk and yogurt. The more carbs you eat at one time, the higher your blood sugar will rise. Spreading carbs all through the day helps keep your blood sugar levels within your target range. Counting carbs is one of the best ways to keep your blood sugar under control. If you use insulin, counting carbs helps you match the right amount of insulin to the number of grams of carbs in a meal. Then you can change your diet and insulin dose as needed. Testing your blood sugar several times a day can help you learn how carbs affect your blood sugar. A registered dietitian or certified diabetes educator can help you plan meals and snacks. Follow-up care is a key part of your treatment and safety. Be sure to make and go to all appointments, and call your doctor if you are having problems. It's also a good idea to know your test results and keep a list of the medicines you take. How can you care for yourself at home? Know your daily amount of carbohydrates  Your daily amount depends on several things, such as your weight, how active you are, which diabetes medicines you take, and what your goals are for your blood sugar levels. A registered dietitian or certified diabetes educator can help you plan how many carbs to include in each meal and snack. For most adults, a guideline for the daily amount of carbs is:  · 45 to 60 grams at each meal. That's about the same as 3 to 4 carbohydrate servings. · 15 to 20 grams at each snack.  That's about the same as 1 carbohydrate serving. Count carbs  Counting carbs lets you know how much rapid-acting insulin to take before you eat. If you use an insulin pump, you get a constant rate of insulin during the day. So the pump must be programmed at meals. This gives you extra insulin to cover the rise in blood sugar after meals. If you take insulin:  · Learn your own insulin-to-carb ratio. You and your diabetes health professional will figure out the ratio. You can do this by testing your blood sugar after meals. For example, you may need a certain amount of insulin for every 15 grams of carbs. · Add up the carb grams in a meal. Then you can figure out how many units of insulin to take based on your insulin-to-carb ratio. · Exercise lowers blood sugar. You can use less insulin than you would if you were not doing exercise. Keep in mind that timing matters. If you exercise within 1 hour after a meal, your body may need less insulin for that meal than it would if you exercised 3 hours after the meal. Test your blood sugar to find out how exercise affects your need for insulin. If you do or don't take insulin:  · Look at labels on packaged foods. This can tell you how many carbs are in a serving. You can also use guides from the American Diabetes Association. · Be aware of portions, or serving sizes. If a package has two servings and you eat the whole package, you need to double the number of grams of carbohydrate listed for one serving. · Protein, fat, and fiber do not raise blood sugar as much as carbs do. If you eat a lot of these nutrients in a meal, your blood sugar will rise more slowly than it would otherwise. Eat from all food groups  · Eat at least three meals a day. · Plan meals to include food from all the food groups. The food groups include grains, fruits, dairy, proteins, and vegetables. · Talk to your dietitian or diabetes educator about ways to add limited amounts of sweets into your meal plan.   · If you drink alcohol, talk to your doctor. It may not be recommended when you are taking certain diabetes medicines. Where can you learn more? Go to [email or web address] and sign in to your [employer] account. Enter S799 in the [employer] box to learn more about \"Counting Carbohydrates: Care Instructions. \"     If you do not have an account, please click on the \"Sign Up Now\" link. Current as of: August 31, 2020               Content Version: 12.8  © 2006-2021 Healthwise[employer]. Care instructions adapted under license by [facility] ([facility]). If you have questions about a medical condition or this instruction, always ask your healthcare professional. [address] any warranty or liability for your use of this information.

## 2021-03-30 NOTE — ASSESSMENT & PLAN NOTE
Uncontrolled  Will need to increase medication along with lifestyle modifications  Will refer to clinical pharmacist for assistance

## 2021-05-17 ENCOUNTER — TELEPHONE (OUTPATIENT)
Dept: FAMILY MEDICINE CLINIC | Age: 66
End: 2021-05-17

## 2021-05-17 NOTE — TELEPHONE ENCOUNTER
Medication Management Service  [facility]  [phone]        Referral received from patient's PCP (DO Lia) for DM management under the collaborative practice agreement. Most recent HgbA1c was reported as 10.5% on 03/24/2021. Patient's current DM medications include the following:    Current Outpatient Medications   Medication Sig Dispense Refill    metFORMIN (GLUCOPHAGE-XR) 500 MG extended release tablet TAKE 1 TABLET BY MOUTH  EVERY DAY 90 tablet 3    HUMULIN N KWIKPEN 100 UNIT/ML injection pen INJECT SUBCUTANEOUSLY 80  UNITS IN THE MORNING AND 62 UNITS IN THE EVENING 135 mL 3     Viktoriya San is a 61 y.o. female who was called by pharmacy for review of blood glucose and adherence to DM medications. First attempt made to reach patient by telephone. Unable to leave message for patient to return phone call to [phone] due to mailbox being full. Dilip Cordova, Pharm. D., [address] [facility] Medication Management Service  [phone]  5/17/2021  12:23 PM

## 2021-05-18 ENCOUNTER — TELEPHONE (OUTPATIENT)
Dept: FAMILY MEDICINE CLINIC | Age: 66
End: 2021-05-18

## 2021-05-18 NOTE — TELEPHONE ENCOUNTER
Medication Management Service  [facility]  [phone]        Referral received from patient's PCP (DO Lia) for DM management under the collaborative practice agreement. Most recent HgbA1c was reported as 10.5% on 03/24/2021. Patient's current DM medications include the following:    Current Outpatient Medications   Medication Sig Dispense Refill    metFORMIN (GLUCOPHAGE-XR) 500 MG extended release tablet TAKE 1 TABLET BY MOUTH  EVERY DAY 90 tablet 3    HUMULIN N KWIKPEN 100 UNIT/ML injection pen INJECT SUBCUTANEOUSLY 80  UNITS IN THE MORNING AND 62 UNITS IN THE EVENING 135 mL 3     Kamron Licona is a 61 y.o. female who was called by pharmacy for review of blood glucose and adherence to DM medications. Second attempt made to reach patient by telephone. Unable to leave message for patient to return phone call to [phone] due to mailbox being full. Isaac Moctezuma, Pharm. D., [address] [facility] Medication Management Service  [phone]  5/18/2021  11:46 AM

## 2021-05-25 ENCOUNTER — TELEPHONE (OUTPATIENT)
Dept: FAMILY MEDICINE CLINIC | Age: 66
End: 2021-05-25

## 2021-05-25 NOTE — TELEPHONE ENCOUNTER
Medication Management Service  [facility]  [phone]        Referral received from patient's PCP (DO Lia) for DM management under the collaborative practice agreement. Most recent HgbA1c was reported as 10.5% on 03/24/2021. Spoke with patient by telephone today and scheduled a Medication Management appointment with patient for  06/17/2021 at 3:30pm.  (Patient will be on vacation through 6/7/2021)    Patient's current DM medications include the following:    Medication Sig Comments    metFORMIN (GLUCOPHAGE-XR) 500 MG extended release tablet TAKE 1 TABLET BY MOUTH  EVERY DAY     HUMULIN N KWIKPEN 100 UNIT/ML injection pen INJECT SUBCUTANEOUSLY 80  UNITS IN THE MORNING AND 58 UNITS IN THE EVENING Patient reports skipping her nightly insulin particularly if she is not home during the evening. Last night, patient did not take insulin since she was at a baseball game. Patient mentioned that she gets up at 3am and is concerned about taking the insulin later in the evening and then again first thing in the morning. Plan to look at medication options for DM management. Patient reports that her maximum copay for medications is $120 per 90 day fill which is what patient pays for Humulin N. Patient is self-insured and mentioned that she does have a formulary for her medications. Patient would need to get online to access that formulary. Might give some consideration to switching insulin to another long acting insulin or using GLP1 agonist.  Further clarification will be needed around the adverse effect with metformin and ability to increase to 1000 mg twice daily. Anticipating that during the initial visit to provide some education on food choices for individuals with DM using the following brochures.     DM Educational Material:   [x] What is diabetes   [] Checking your blood sugar   [x] Know your numbers   [x] Hypoglycemia symptoms and management/Hyperglycemia symptoms   [x] Blood glucose log sheet(s)   [x] Planning Healthy Meals   [x] DM snacks   [x] One Week-DM meal plan   [] Building a balanced meal   [] Other    Discussed blood glucose goals for an individual with diabetes. Shared that fasting blood glucose goal would be between 80-130mg/dL for herself. Goal HgbA1c would be less than 7%. Patient shared that she currently is not checking her blood glucose. Plan will be for patient to checking fasting blood glucose daily and then to keep track if she took her evening dose of Humulin N. Unclear as to how often patient is taking that second dose of insulin. Patient will write this information into a notebook and bring to office visit on 2021. Connor Menezes, Pharm. D., [address] [facility] Medication Management Service  [phone]  2021  4:40 PM    ==========================================================================  For Pharmacy Admin Tracking Only     CPA in place:   Yes   Recommendation Provided To: Patient/Caregiver: 1 via Telephone   Intervention Detail: Adherence Monitorin   Total # of Interventions Recommended: 1   Total # of Interventions Accepted: 1   Intervention Accepted By: Patient/Caregiver: 1   Time Spent (min): 30

## 2021-06-17 ENCOUNTER — OFFICE VISIT (OUTPATIENT)
Dept: FAMILY MEDICINE CLINIC | Age: 66
End: 2021-06-17

## 2021-06-17 VITALS — WEIGHT: 225.4 LBS | SYSTOLIC BLOOD PRESSURE: 120 MMHG | BODY MASS INDEX: 36.4 KG/M2 | DIASTOLIC BLOOD PRESSURE: 70 MMHG

## 2021-06-17 DIAGNOSIS — Z79.899 MEDICATION MANAGEMENT: Primary | ICD-10-CM

## 2021-06-17 PROCEDURE — APPNB60 APP NON BILLABLE TIME 46-60 MINS: Performed by: PHARMACIST

## 2021-06-17 PROCEDURE — 99999 PR OFFICE/OUTPT VISIT,PROCEDURE ONLY: CPT | Performed by: PHARMACIST

## 2021-06-17 RX ORDER — METFORMIN HYDROCHLORIDE 500 MG/1
1000 TABLET, EXTENDED RELEASE ORAL 2 TIMES DAILY WITH MEALS
Qty: 360 TABLET | Refills: 3
Start: 2021-06-17 | End: 2021-07-13 | Stop reason: SDUPTHER

## 2021-06-17 NOTE — PROGRESS NOTES
Medication Management Service  [facility]  [phone]      Tavon Ga is a 61 y.o. female who met with pharmacy for management of DM under consult agreement per referral from Bianca Ortiz DO. Subjective/Objective     New Problems/Changes: Elevated HgbA1c last reported as 10.5% on 03/24/2021. DIET  Types of food patient reports eating includes chicken sandwich with milkshake, Hamburger with brussel sprouts, beef stir lake, pizza. EXERCISE  Patient enjoys swimming and has swimming pool in her back yard. Patient cuts the grass and talks of doing house work like vacuuming. 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 WEIGHT 102.2 kg    DIABETES MANAGEMENT  Most Recent A1c:   10.5% on 03/24/2021    Home Glucose Readings  Patient did bring blood glucose log to visit today. Readings ranged from 170s up to 449. Hypoglycemia: No reports of hypoglycemia signs/symptoms by patient    Microalbumin/Creatinine Ratio:   Microalbumin Creatinine Ratio reported as  on 03/29/2021. Eye Exam: Did not discuss    Foot Exam: Did not discuss    MEDICATIONS    New/Discontinued medications since last encounter: None noted. Diabetes Medications:   Medication Sig Comments    metFORMIN (GLUCOPHAGE-XR) 500 MG extended release tablet Take 2 tablets by mouth 2 times daily (with meals) 360 tablet    lisinopril (PRINIVIL;ZESTRIL) 2.5 MG tablet Take 2 tablets by mouth daily 90 tablet    fenofibrate (TRIGLIDE) 160 MG tablet Take 1 tablet by mouth daily 90 tablet    aspirin (ASPIRIN LOW DOSE) 81 MG EC tablet TAKE 1 TABLET BY MOUTH ONCE DAILY 90 tablet    diclofenac sodium (VOLTAREN) 1 % GEL Apply 4 g topically 4 times daily 4 Tube    atorvastatin (LIPITOR) 80 MG tablet TAKE 1 TABLET BY MOUTH ONCE DAILY 90 tablet    HUMULIN N KWIKPEN 100 UNIT/ML injection pen INJECT SUBCUTANEOUSLY 80  UNITS IN THE MORNING AND 60 UNITS IN THE EVENING Taking as prescribed. Although there are a several occasions on blood glucose log that patient admits to skipping evening dose. Previous Diabetes Medications: Limited discussion during this visit. Plan to address during future visit. Hyperlipidemia Medications:   Atorvastatin 80mg daily    On Statin: Yes    On Aspirin: Yes    On ACE-I or ARB for HTN or Microalbuminuria: Yes-Lisinopril 2.5mg-2 tablets daily. Medication Adherence/Cost/Adverse Events: Patient is self insured. Assessment/Plan     Diabetes Management:   Patient currently on metformin XR 500mg daily. Plan to increase to 2000mg daily over next week. Patient will begin with taking 500mg twice daily for 7 days and then increase to 1000mg QAM and 500mg QPM for 7 days then 1000mg twice daily. Decided to titrate weekly to help with adherence. Patient will continue with Humulin N 80 units in the morning and 60 units in the evening. Patient will work on adherence with evening dose. Will consider switching Humulin N to a long acting basal insulin like glargine once metformin is titrated. Unsure what cost would be with patient's current insurance plan. Patient will continue to check blood glucose twice daily and for signs/symptoms of hypoglycemia. Patient to work on diet management and consistent exercise/activity. Next Follow-Up: in 4 weeks. (July 15 @3:30pm). Spoke with Dr Johnathan Mitchell. Ok to cancel 06/29/2021 PCP appointment and reschedule in the fall. This will give patient an opportunity to work on HgbA1c over the summer since no changes have been made since last visit with PCP. Next PCP appointment is now scheduled for 09/15/2021. Patient will plan to complete lab work prior to that appointment. Patient verbalized understanding of care plan. Patient advised to call Medication Management with any questions, concerns, or changes prior to next appointment.       Education   DM Educational Material:    The following brochure(s)/handout(s) discussed and given to patient during visit:     [x] What is diabetes   [] Checking your blood sugar   [x] Know your numbers   [x] Hypoglycemia symptoms and management/Hyperglycemia symptoms   [x] Blood glucose log sheet(s)   [x] Planning Healthy Meals   [x] DM snacks   [x] One Week-DM meal plan   [x] Blood glucose log sheet(s)   [] John Gant Daily, Pharm. D., [address] Medication Management Service  [phone]  6/17/2021  5:33 PM    ======================================================================================  For Pharmacy Admin Tracking Only     CPA in place:   Yes   Intervention Detail: Dose Adjustment: 1, reason: Therapy Optimization   Total # of Interventions Recommended: 1   Total # of Interventions Accepted: 1   Intervention Accepted By: Provider: 1 and Patient/Caregiver: 1   Time Spent (min): 60

## 2021-06-18 ENCOUNTER — TELEPHONE (OUTPATIENT)
Dept: FAMILY MEDICINE CLINIC | Age: 66
End: 2021-06-18

## 2021-06-18 RX ORDER — INSULIN HUMAN 100 [IU]/ML
INJECTION, SUSPENSION SUBCUTANEOUS
Qty: 135 ML | Refills: 3 | Status: SHIPPED | OUTPATIENT
Start: 2021-06-18 | End: 2021-06-18 | Stop reason: SDUPTHER

## 2021-06-18 RX ORDER — INSULIN HUMAN 100 [IU]/ML
INJECTION, SUSPENSION SUBCUTANEOUS
Qty: 135 ML | Refills: 3 | Status: SHIPPED | OUTPATIENT
Start: 2021-06-18 | End: 2021-09-23 | Stop reason: SDUPTHER

## 2021-06-18 NOTE — TELEPHONE ENCOUNTER
Denzel Anderson is called to request a refill on the following medication(s):    Medication Request:  Requested Prescriptions     Pending Prescriptions Disp Refills    insulin NPH (HUMULIN N KWIKPEN) 100 UNIT/ML injection pen 135 mL 3     Sig: INJECT SUBCUTANEOUSLY 80  UNITS IN THE MORNING AND 60 UNITS IN THE EVENING     Last Visit Date (If Applicable):  0/88/4483    Next Visit Date:    7/15/2021    Third attempt to send prescription send to [address] [place] [address] under CPA agreement. Earlier attempts did not escribe to the pharmacy. Will route Rx to PCP for review and signature. Milton Carrera, Pharm. D., [address] Medication Management Service  [phone]  6/18/2021  12:17 PM        ========================================  For Pharmacy Admin Tracking Only     CPA in place:   Yes   Intervention Detail: Refill(s) Provided   Total # of Interventions Recommended: 1   Total # of Interventions Accepted: 1   Intervention Accepted By: Patient/Caregiver: 1   Time Spent (min): 20

## 2021-06-18 NOTE — TELEPHONE ENCOUNTER
Val Pérez is called to request a refill on the following medication(s):    Medication Request:  Requested Prescriptions     Pending Prescriptions Disp Refills    insulin NPH (HUMULIN N KWIKPEN) 100 UNIT/ML injection pen 135 mL 3     Sig: INJECT SUBCUTANEOUSLY 80  UNITS IN THE MORNING AND 60 UNITS IN THE EVENING     Last Visit Date (If Applicable):  7/34/3506    Next Visit Date:    7/15/2021    Second attempt to send prescription send to [address] [place] [address] under CPA agreement. Earlier attempt did not escribe to the pharmacy. Elizabeth Paige, Pharm. D., [address] Medication Management Service  [phone]  6/18/2021  12:09 PM        ========================================  For Pharmacy Admin Tracking Only     CPA in place:   Yes   Intervention Detail: Refill(s) Provided   Total # of Interventions Recommended: 1   Total # of Interventions Accepted: 1   Intervention Accepted By: Patient/Caregiver: 1   Time Spent (min): 20

## 2021-06-18 NOTE — TELEPHONE ENCOUNTER
Stefanybrandon Mena is called to request a refill on the following medication(s):    Medication Request:  Requested Prescriptions     Pending Prescriptions Disp Refills    insulin NPH (HUMULIN N KWIKPEN) 100 UNIT/ML injection pen 135 mL 3     Sig: INJECT SUBCUTANEOUSLY 80  UNITS IN THE MORNING AND 60 UNITS IN THE EVENING     Last Visit Date (If Applicable):  6/64/8035    Next Visit Date:    7/15/2021    Prescription sent to [address] [place] [address] under CPA agreement. Dilip Cordova, Pharm. D., [address] [facility] Medication Management Service  [phone]  6/18/2021  12:03 PM

## 2021-07-07 DIAGNOSIS — E78.5 DYSLIPIDEMIA: ICD-10-CM

## 2021-07-07 RX ORDER — FENOFIBRATE 160 MG/1
160 TABLET ORAL DAILY
Qty: 90 TABLET | Refills: 1 | Status: SHIPPED | OUTPATIENT
Start: 2021-07-07 | End: 2021-12-16

## 2021-07-07 NOTE — TELEPHONE ENCOUNTER
Keo Escalona is calling to request a refill on the following medication(s):    Medication Request:  Requested Prescriptions     Pending Prescriptions Disp Refills    fenofibrate (TRIGLIDE) 160 MG tablet [Pharmacy Med Name: FENOFIBRATE  160MG  TAB] 90 tablet 1     Sig: TAKE 1 TABLET BY MOUTH  DAILY       Last Visit Date (If Applicable):  3/43/3532    Next Visit Date:    9/15/2021

## 2021-07-12 ENCOUNTER — OFFICE VISIT (OUTPATIENT)
Dept: FAMILY MEDICINE CLINIC | Age: 66
End: 2021-07-12
Payer: COMMERCIAL

## 2021-07-12 VITALS
SYSTOLIC BLOOD PRESSURE: 130 MMHG | OXYGEN SATURATION: 97 % | WEIGHT: 227 LBS | BODY MASS INDEX: 37.82 KG/M2 | TEMPERATURE: 97 F | RESPIRATION RATE: 13 BRPM | HEIGHT: 65 IN | HEART RATE: 86 BPM | DIASTOLIC BLOOD PRESSURE: 68 MMHG

## 2021-07-12 DIAGNOSIS — L24.89 IRRITANT CONTACT DERMATITIS DUE TO OTHER AGENTS: ICD-10-CM

## 2021-07-12 DIAGNOSIS — R21 RASH IN ADULT: Primary | ICD-10-CM

## 2021-07-12 PROCEDURE — 99213 OFFICE O/P EST LOW 20 MIN: CPT | Performed by: FAMILY MEDICINE

## 2021-07-12 RX ORDER — CLOBETASOL PROPIONATE 0.5 MG/G
OINTMENT TOPICAL
Qty: 60 G | Refills: 0 | Status: SHIPPED | OUTPATIENT
Start: 2021-07-12 | End: 2022-01-06 | Stop reason: ALTCHOICE

## 2021-07-12 NOTE — PROGRESS NOTES
Progress Note    Yola Celeste is a 61 y.o.  female who presents today alone for evaluation of   Chief Complaint   Patient presents with    Rash           HPI:   Patient is here for same day visit to discuss rash. Patient states she has had red itchy bumps and scale over her b/l UE and torso for the past week. Patient denies contact with poison ivy. Patient denies changing soaps or detergents. Patient denies changes in foods in her diet. Patient denies relief with OTC products. Health Maintenance Due   Topic Date Due    Hepatitis C screen  Never done    HIV screen  Never done    Shingles Vaccine (1 of 2) Never done    Low dose CT lung screening  Never done    Diabetic retinal exam  08/18/2016    Cervical cancer screen  12/21/2019    A1C test (Diabetic or Prediabetic)  06/24/2021        Current Medications:     Current Outpatient Medications   Medication Sig Dispense Refill    clobetasol (TEMOVATE) 0.05 % ointment Apply topically 2 times daily. 60 g 0    fenofibrate (TRIGLIDE) 160 MG tablet TAKE 1 TABLET BY MOUTH  DAILY 90 tablet 1    insulin NPH (HUMULIN N KWIKPEN) 100 UNIT/ML injection pen INJECT SUBCUTANEOUSLY 80  UNITS IN THE MORNING AND 60 UNITS IN THE EVENING 135 mL 3    metFORMIN (GLUCOPHAGE-XR) 500 MG extended release tablet Take 2 tablets by mouth 2 times daily (with meals) 360 tablet 3    lisinopril (PRINIVIL;ZESTRIL) 2.5 MG tablet Take 2 tablets by mouth daily 90 tablet 3    aspirin (ASPIRIN LOW DOSE) 81 MG EC tablet TAKE 1 TABLET BY MOUTH ONCE DAILY 90 tablet 3    diclofenac sodium (VOLTAREN) 1 % GEL Apply 4 g topically 4 times daily 4 Tube 3    atorvastatin (LIPITOR) 80 MG tablet TAKE 1 TABLET BY MOUTH ONCE DAILY 90 tablet 3    metoprolol tartrate (LOPRESSOR) 25 MG tablet TAKE 1 TABLET BY MOUTH  DAILY 90 tablet 3     No current facility-administered medications for this visit. Allergies:      Allergies   Allergen Reactions    Sulfa Antibiotics         Medical History: Past Medical History:   Diagnosis Date    Diabetes mellitus ([facility] [address]     Dyslipidemia     Urticaria, chronic        No past surgical history on file. Family History   Problem Relation Age of Onset    High Cholesterol Father     High Blood Pressure Father     Heart Disease Father     Diabetes Father     High Cholesterol Sister     High Blood Pressure Sister         Social History:     Social History     Socioeconomic History    Marital status:      Spouse name: Not on file    Number of children: Not on file    Years of education: Not on file    Highest education level: Not on file   Occupational History    Not on file   Tobacco Use    Smoking status: Former Smoker     Packs/day: 1.00     Years: 21.00     Pack years: 21.00     Types: Cigarettes     Quit date: 2012     Years since quittin.0    Smokeless tobacco: Never Used   Substance and Sexual Activity    Alcohol use: No    Drug use: No    Sexual activity: Not on file   Other Topics Concern    Not on file   Social History Narrative    Not on file     Social Determinants of Health     Financial Resource Strain: Low Risk     Difficulty of Paying Living Expenses: Not hard at all   Food Insecurity: No Food Insecurity    Worried About Running Out of Food in the Last Year: Never true    Narayan of Food in the Last Year: Never true   Transportation Needs: No Transportation Needs    Lack of Transportation (Medical): No    Lack of Transportation (Non-Medical):  No   Physical Activity:     Days of Exercise per Week:     Minutes of Exercise per Session:    Stress:     Feeling of Stress :    Social Connections:     Frequency of Communication with Friends and Family:     Frequency of Social Gatherings with Friends and Family:     Attends Sabianist Services:     Active Member of Clubs or Organizations:     Attends Club or Organization Meetings:     Marital Status:    Intimate Partner Violence:     Fear of Current or Ex-Partner:     Emotionally Abused:     Physically Abused:     Sexually Abused:         ROS:     Constitutional: No fevers, chills, fatigue. ENT: No nasal congestion or sore throat  Respiratory: No difficulty in breathing or cough. Cardiovascular: No chest pain, palpitations or shortness of breath  Gastrointestinal: No abdominal pain or change in bowel movements. Genitourinary: No change in urinary frequency or dysuria. Skin: + rash; no skin lesions  Neurological: No weakness. No headaches. Last Filed Vitals:  /68 (Site: Left Upper Arm, Position: Sitting, Cuff Size: Medium Adult)   Pulse 86   Temp 97 °F (36.1 °C) (Temporal)   Resp 13   Ht 5' 5\" (1.651 m)   Wt 227 lb (103 kg)   SpO2 97%   BMI 37.77 kg/m²      Physical Examination:     GENERAL APPEARANCE: in no acute distress, well developed, well nourished. HEAD: normocephalic, atraumatic. EYES: extraocular movement intact (EOMI), pupils equal, round, reactive to light and accommodation. EARS: normal, tympanic membrane intact, clear, auditory canal clear. NOSE: nares patent, no erythema, sinuses nontender bilaterally, no rhinorrhea. ORAL CAVITY: mucosa moist, no lesions. THROAT: clear, no mass, no exudate. NECK/THYROID: neck supple, full range of motion, no thyromegaly. HEART: no murmurs, regular rate and rhythm, S1, S2 normal.   LUNGS: clear to auscultation bilaterally, no wheezes, rales, rhonchi. ABDOMEN: normal, bowel sounds present, soft, nontender, nondistended, no rebound guarding or rigidity  SKIN: +maculopapular rash over bilateral UE. Recent Labs/ In Office Testing/ Radiograph review:     Office Visit on 03/29/2021   Component Date Value Ref Range Status    Microalb, Ur 03/29/2021 10   Final    Creatinine Ur POCT 03/29/2021 50   Final    Microalbumin Creatinine Ratio 03/29/2021    Final       No results found for this visit on 07/12/21.            Assessment/Plan:     Amie Patterson was seen today for rash.    Diagnoses and all orders for this visit:    Rash in adult  -     clobetasol (TEMOVATE) 0.05 % ointment; Apply topically 2 times daily. Irritant contact dermatitis due to other agents  -     clobetasol (TEMOVATE) 0.05 % ointment; Apply topically 2 times daily. Due to her uncontrolled IDDM, topical therapy is recommended. Provided samples of AH to pt. RTC precautions provided. All questions answered and addressed to patient satisfaction. Patient understands and agrees to the plan. The patient was evaluated and treated today based on the osteopathic principle that each person is a unit of body, mind, and spirit, the body is capable of self-regulation, self-healing, and health maintenance and that structure and function are reciprocally interrelated. Follow-up:   Return if symptoms worsen or fail to improve.       Bri Carr D.O.

## 2021-07-13 DIAGNOSIS — E11.9 TYPE 2 DIABETES MELLITUS WITHOUT COMPLICATION, UNSPECIFIED WHETHER LONG TERM INSULIN USE (HCC): Primary | ICD-10-CM

## 2021-07-13 RX ORDER — METFORMIN HYDROCHLORIDE 500 MG/1
1000 TABLET, EXTENDED RELEASE ORAL 2 TIMES DAILY WITH MEALS
Qty: 360 TABLET | Refills: 1 | Status: SHIPPED | OUTPATIENT
Start: 2021-07-13 | End: 2021-07-15 | Stop reason: SDUPTHER

## 2021-07-13 NOTE — TELEPHONE ENCOUNTER
Ailyn Anderson is calling to request a refill on the following medication(s):    Medication Request:  Requested Prescriptions     Pending Prescriptions Disp Refills    metFORMIN (GLUCOPHAGE-XR) 500 MG extended release tablet 180 tablet 1     Sig: Take 2 tablets by mouth 2 times daily (with meals)       Last Visit Date (If Applicable):  2/91/8779    Next Visit Date:    9/15/2021

## 2021-07-15 ENCOUNTER — OFFICE VISIT (OUTPATIENT)
Dept: FAMILY MEDICINE CLINIC | Age: 66
End: 2021-07-15

## 2021-07-15 VITALS — WEIGHT: 228.4 LBS | BODY MASS INDEX: 38.01 KG/M2

## 2021-07-15 DIAGNOSIS — E11.9 TYPE 2 DIABETES MELLITUS WITHOUT COMPLICATION, UNSPECIFIED WHETHER LONG TERM INSULIN USE (HCC): ICD-10-CM

## 2021-07-15 DIAGNOSIS — Z79.899 MEDICATION MANAGEMENT: Primary | ICD-10-CM

## 2021-07-15 DIAGNOSIS — R80.9 MICROALBUMINURIA: ICD-10-CM

## 2021-07-15 PROCEDURE — 99999 PR OFFICE/OUTPT VISIT,PROCEDURE ONLY: CPT | Performed by: PHARMACIST

## 2021-07-15 PROCEDURE — APPNB60 APP NON BILLABLE TIME 46-60 MINS: Performed by: PHARMACIST

## 2021-07-15 RX ORDER — METFORMIN HYDROCHLORIDE 500 MG/1
1000 TABLET, EXTENDED RELEASE ORAL 2 TIMES DAILY WITH MEALS
Qty: 56 TABLET | Refills: 0 | Status: SHIPPED | OUTPATIENT
Start: 2021-07-15 | End: 2021-07-16 | Stop reason: SDUPTHER

## 2021-07-15 RX ORDER — LISINOPRIL 2.5 MG/1
5 TABLET ORAL DAILY
Qty: 90 TABLET | Refills: 1 | Status: SHIPPED | OUTPATIENT
Start: 2021-07-15 | End: 2021-09-22

## 2021-07-15 NOTE — PROGRESS NOTES
Microalbumin Creatinine Ratio reported as  on 03/29/2021. Eye Exam: Did not discuss    Foot Exam: Did not discuss    MEDICATIONS    New/Discontinued medications since last encounter: None noted. Diabetes Medications:   Medication Sig Comments    metFORMIN (GLUCOPHAGE-XR) 500 MG extended release tablet Take 2 tablets by mouth 2 times daily (with meals) 360 tablet    HUMULIN N KWIKPEN 100 UNIT/ML injection pen INJECT SUBCUTANEOUSLY 80  UNITS IN THE MORNING AND 60 UNITS IN THE EVENING Taking as prescribed. Although there are a several occasions on blood glucose log that patient admits to skipping evening dose. No side effects noted with metformin XR titration. Previous Diabetes Medications: Limited discussion during this visit. Plan to address during future visit. Hyperlipidemia Medications:   Atorvastatin 80mg daily    On Statin: Yes    On Aspirin: Yes    On ACE-I or ARB for HTN or Microalbuminuria: Yes-Lisinopril 2.5mg-2 tablets daily. Medication Adherence/Cost/Adverse Events: Patient is self insured. Patient reports good adherence with prescribed medications for DM management. Missed 2 doses of insulin since visit on 06/17/2021 and no doses of metformin. Assessment/Plan     Diabetes Management:   Patient increased to metformin XR 1000mg twice daily on 07/02/2021. May continue to see some effect on blood glucose over the next few days. No reported lows. Average fasting blood glucose is at goal since increasing to metformin XR 1000mg twice daily. Plan to continue with metformin XR as prescribed along with Humulin N 80 units in the morning and 60 units in the evening. Patient is out of metformin XR and was told by mail order that it may several days to arrive. Recommendation made to have patient some metformin XR at local pharmacy so that there is no break in therapy. Prescription sent to [facility] for a 14 day supply.   Patient reports not having a copay with metformin. Patient will continue to check blood glucose twice daily and for signs/symptoms of hypoglycemia. Patient to work on diet management, consistent exercise/activity, and adherence with prescribed medications. Next Follow-Up: in 4 weeks. (August 12 @3:30pm). Next PCP appointment is scheduled for 09/15/2021. Patient will plan to complete lab work prior to that appointment. Patient verbalized understanding of care plan. Patient advised to call Medication Management with any questions, concerns, or changes prior to next appointment. Marco Andrea, Pharm. D., [address] [facility] Medication Management Service  [phone]  7/15/2021  4:30 PM    ======================================================================================  For Pharmacy Admin Tracking Only     CPA in place:   Yes   Time Spent (min): 60

## 2021-07-15 NOTE — Clinical Note
Hi Dr Wilton Prakash,    No changes made today. Blood glucose readings appear to be at goal or almost at goal. Big changes since last visit. See reported blood glucose readings within progress note. Next follow up scheduled for 4 weeks. Next PCP appointment scheduled for 09/15/2021. Lon Peñaloza, Pharm. D., [address]  Medication Management Service  [phone]  7/15/2021  4:53 PM

## 2021-07-16 DIAGNOSIS — E11.9 TYPE 2 DIABETES MELLITUS WITHOUT COMPLICATION, UNSPECIFIED WHETHER LONG TERM INSULIN USE (HCC): ICD-10-CM

## 2021-07-16 RX ORDER — METFORMIN HYDROCHLORIDE 500 MG/1
1000 TABLET, EXTENDED RELEASE ORAL 2 TIMES DAILY WITH MEALS
Qty: 360 TABLET | Refills: 1 | Status: SHIPPED | OUTPATIENT
Start: 2021-07-16 | End: 2021-11-17

## 2021-07-16 NOTE — TELEPHONE ENCOUNTER
Prudencio Toscano is calling to request a refill on the following medication(s):    Medication Request:  Requested Prescriptions     Pending Prescriptions Disp Refills    metFORMIN (GLUCOPHAGE-XR) 500 MG extended release tablet 360 tablet 1     Sig: Take 2 tablets by mouth 2 times daily (with meals) for 14 days       Last Visit Date (If Applicable):  8/24/2689    Next Visit Date:    9/15/2021

## 2021-08-12 ENCOUNTER — OFFICE VISIT (OUTPATIENT)
Dept: FAMILY MEDICINE CLINIC | Age: 66
End: 2021-08-12

## 2021-08-12 VITALS — SYSTOLIC BLOOD PRESSURE: 126 MMHG | WEIGHT: 228.2 LBS | DIASTOLIC BLOOD PRESSURE: 82 MMHG | BODY MASS INDEX: 37.97 KG/M2

## 2021-08-12 DIAGNOSIS — Z79.899 MEDICATION MANAGEMENT: Primary | ICD-10-CM

## 2021-08-12 PROCEDURE — APPNB60 APP NON BILLABLE TIME 46-60 MINS: Performed by: PHARMACIST

## 2021-08-12 PROCEDURE — 99999 PR OFFICE/OUTPT VISIT,PROCEDURE ONLY: CPT | Performed by: PHARMACIST

## 2021-08-13 NOTE — PROGRESS NOTES
Medication Management Service  [facility]  [phone]      Ronda Ryan is a 61 y.o. female who met with pharmacy for management of DM under consult agreement per referral from Sultana Díaz DO. Patient last seen by Taras Echeverria on 06/17/2021. Plan was for patient to slowly titrate metformin XR [id number] twice daily. Patient would also check blood glucose twice daily and record readings. Education also provided around food choices for someone with diabetes. Subjective/Objective     New Problems/Changes: Elevated HgbA1c last reported as 10.5% on 03/24/2021. DIET  Types of food patient reports eating includes chicken sandwich with milkshake, Hamburger with brussel sprouts, beef stir lake, pizza. Patient has made some changes to diet. More aware of carbohydrates and servings sizes. Patient talked of making a quesadilla without a flour tortilla but rather a tortilla made out of spinach and eggs. EXERCISE  Patient enjoys swimming and has swimming pool in her back yard. Patient cuts the grass and talks of doing house work like vacuuming. 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 Patient mentioned during today's visit that she has not been swimming as much because of the rainy weather. Patient does find herself doing more walking.       WEIGHT     103.6 kg-07/15/2021  102.2 kg-6/17/2021    DIABETES MANAGEMENT  Most Recent A1c:   10.5% on 03/24/2021    Home Glucose Readings     Date FBG Dinner   7/3/2021 146    7/4/2021 121 159   7/5/2021 157    7/6/2021 90 118   7/7/2021 106 210   7/8/2021 89 238   7/9/2021 84 144   7/10/2021 108    7/11/2021 224    7/12/2021 152 244   7/13/2021 110 160   7/14/2021 83 261   7/15/2021 150    Average 125 192     Date FBG Dinner   7/21/2021 266 297   7/22/2021 154    7/23/2021 203 171   7/24/2021 137    7/25/2021 192 255   7/26/2021 120    7/27/2021 209 265   7/28/2021 164 414   7/29/2021 167 336   7/30/2021 245 180   7/31/2021 205 8/1/2021 212 457   8/2/2021 115    8/3/2021 178 180   8/4/2021 107 288   8/5/2021 155    8/6/2021 318    8/7/2021 200 363   8/8/2021 162    8/9/2021 176    8/10/2021 164 410   8/11/2021 112 89   8/12/2021 167    Average 179 285     Range of Readings 89-457mg/dL. Patient does check blood glucose readings at different times in the morning. Can be as early as 3am until 8am.  High readings noted throughout that interval.      Hypoglycemia: No reports of hypoglycemia signs/symptoms by patient. Microalbumin/Creatinine Ratio:   Microalbumin Creatinine Ratio reported as  on 03/29/2021. Eye Exam: Did not discuss    Foot Exam: Did not discuss    MEDICATIONS    New/Discontinued medications since last encounter: None noted. Diabetes Medications:   Medication Sig Comments    metFORMIN (GLUCOPHAGE-XR) 500 MG extended release tablet Take 2 tablets by mouth 2 times daily (with meals) 360 tablet    HUMULIN N KWIKPEN 100 UNIT/ML injection pen INJECT SUBCUTANEOUSLY 80  UNITS IN THE MORNING AND 60 UNITS IN THE EVENING Taking as prescribed. Patient continues to have a few occasions on blood glucose log that patient admits to skipping evening dose. No side effects noted with metformin XR titration. Hyperlipidemia Medications:   Atorvastatin 80mg daily    On Statin: Yes    On Aspirin: Yes    On ACE-I or ARB for HTN or Microalbuminuria: Yes-Lisinopril 2.5mg-2 tablets daily. Medication Adherence/Cost/Adverse Events: Patient is self insured. Continues to miss a some evening dosing of insulin. Assessment/Plan     Diabetes Management:   Blood glucose readings continue to be above goal.  Now reported lows. Patient is aware that higher readings are often due to food choices. Consideration given to other therapies. Patient followed up with insurance company and both Trulicity and Ozempic covered by [employer] and would cost $120 for a 90 day supply.  Will follow up with PCP to see if he has a preference as to which on is started. If PCP is agreeable will start with a sample to make sure patient tolerates therapy before sending to mail order pharmacy. Would ecommend decreasing NPH by 10% as patient misses evening insulin dose 1-2 times per week. Current dose is Humulin N 80 units in the morning and 60 units in the evening. Patient will continue to check blood glucose twice daily and for signs/symptoms of hypoglycemia. Patient to work on diet management, consistent exercise/activity, and adherence with prescribed medications. Next Follow-Up: Next PCP appointment is scheduled for 09/15/2021. Patient will plan to complete lab work prior to that appointment. Medication Management will follow up with patient once plan has been discussed with PCP regarding starting therapy with a GLP1. Patient verbalized understanding of care plan. Patient advised to call Medication Management with any questions, concerns, or changes prior to next appointment. Joya Drake, Pharm. D., [address] [facility] Medication Management Service  [phone]  8/13/2021  12:14 PM    ======================================================================================  . For Efraín Nunez in place:   Yes   Recommendation Provided To: Provider: 1 via Note to Provider and Patient/Caregiver: 1 via In person   Intervention Detail: New Rx: 1, reason: Needs Additional Therapy   Intervention Accepted By: Patient/Caregiver: 1   Time Spent (min): 60

## 2021-08-17 ENCOUNTER — TELEPHONE (OUTPATIENT)
Dept: FAMILY MEDICINE CLINIC | Age: 66
End: 2021-08-17

## 2021-08-17 NOTE — TELEPHONE ENCOUNTER
Medication Management Service ([address] [place] [address])  [facility]  [phone]     CLINICAL PHARMACY NOTE:      Spoke with PCP regarding plan to initiate GLP1 therapy. PCP agreeable to starting either Trulicity or Ozmepic. No samples available in the office for either. Plan will be to initiate therapy once samples are available for patient to begin. Phone call to patient. Shared above information. Writer will call patient when samples are available. Patient will continue to work on diet and exercise to help lower blood glucose readings. Along with taking DM as prescribed. 1.  Metformin XR 500mg-2 tablets twice daily  2. Insulin NPH- 80 units in the morning and 60 units in the evening. Wayne Stern, Pharm. D., [address] [facility] Medication Management Service  [phone]  8/17/2021  1:39 PM

## 2021-08-23 ENCOUNTER — TELEPHONE (OUTPATIENT)
Dept: FAMILY MEDICINE CLINIC | Age: 66
End: 2021-08-23

## 2021-08-23 DIAGNOSIS — Z79.899 MEDICATION MANAGEMENT: Primary | ICD-10-CM

## 2021-08-23 RX ORDER — SEMAGLUTIDE 1.34 MG/ML
INJECTION, SOLUTION SUBCUTANEOUS
Qty: 1 PEN | Refills: 0 | COMMUNITY
Start: 2021-08-23 | End: 2021-09-23 | Stop reason: SDUPTHER

## 2021-08-23 NOTE — TELEPHONE ENCOUNTER
Medication Management Service ([address] [place] [address])  [facility] [place]  [phone]     CLINICAL PHARMACY NOTE:      Spoke with patient by telephone today. Ozempic Sample set aside for patient. Plan to start with 0.25mg weekly for 4 weeks. After 4 weeks, increase to 0.5mg weekly. Patient will plan to take Sunday evening. Will hold off on decreasing NPH dose at this time as limited blood glucose lowering is reported with the 0.25mg weekly dose. Last HgbA1c was reported as 10.5%. Patient will continue to monitor blood glucose twice daily and for signs/symptoms of hypoglycemia. Plan to follow up with patient by telephone [email or web address] for blood glucose review and consideration for insulin dose adjustment at that time particularly as approaching the next step up to the 0.5mg weekly dose. Next PCP appointment is scheduled for 09/15/2021. May need to consider setting aside another Ozempic sample for patient at that time. Patient needs to use mail order pharmacy and is required to fill as 90 day for maximum savings. Jennifer Charlton, Pharm. D., [address] [facility] Medication Management Service  [phone]  8/23/2021  4:37 PM      =======================================================  For Pharmacy Admin Tracking Only     CPA in place:   Yes   Intervention Detail: New Rx: 1, reason: Needs Additional Therapy   Intervention Accepted By: Provider: 1 and Patient/Caregiver: 1   Time Spent (min): 30

## 2021-09-09 LAB
ALBUMIN SERPL-MCNC: 3.9 G/DL
ALP BLD-CCNC: 46 U/L
ALT SERPL-CCNC: 39 U/L
ANION GAP SERPL CALCULATED.3IONS-SCNC: NORMAL MMOL/L
AST SERPL-CCNC: 32 U/L
AVERAGE GLUCOSE: 85
BASOPHILS ABSOLUTE: NORMAL
BASOPHILS RELATIVE PERCENT: NORMAL
BILIRUB SERPL-MCNC: 0.5 MG/DL (ref 0.1–1.4)
BUN BLDV-MCNC: 13 MG/DL
CALCIUM SERPL-MCNC: 9.9 MG/DL
CHLORIDE BLD-SCNC: 106 MMOL/L
CHOLESTEROL, TOTAL: 111 MG/DL
CHOLESTEROL/HDL RATIO: 2.9
CO2: 30 MMOL/L
CREAT SERPL-MCNC: 0.76 MG/DL
EOSINOPHILS ABSOLUTE: NORMAL
EOSINOPHILS RELATIVE PERCENT: NORMAL
GFR CALCULATED: NORMAL
GLUCOSE BLD-MCNC: 85 MG/DL
HBA1C MFR BLD: 7.2 %
HCT VFR BLD CALC: 40.7 % (ref 36–46)
HDLC SERPL-MCNC: 38 MG/DL (ref 35–70)
HEMOGLOBIN: 13.9 G/DL (ref 12–16)
LDL CHOLESTEROL CALCULATED: 55 MG/DL (ref 0–160)
LYMPHOCYTES ABSOLUTE: NORMAL
LYMPHOCYTES RELATIVE PERCENT: NORMAL
MCH RBC QN AUTO: NORMAL PG
MCHC RBC AUTO-ENTMCNC: NORMAL G/DL
MCV RBC AUTO: NORMAL FL
MONOCYTES ABSOLUTE: NORMAL
MONOCYTES RELATIVE PERCENT: NORMAL
NEUTROPHILS ABSOLUTE: NORMAL
NEUTROPHILS RELATIVE PERCENT: NORMAL
NONHDLC SERPL-MCNC: NORMAL MG/DL
PDW BLD-RTO: NORMAL %
PLATELET # BLD: NORMAL 10*3/UL
PMV BLD AUTO: NORMAL FL
POTASSIUM SERPL-SCNC: 3.9 MMOL/L
RBC # BLD: NORMAL 10*6/UL
SODIUM BLD-SCNC: 139 MMOL/L
TOTAL PROTEIN: 6.6
TRIGL SERPL-MCNC: 101 MG/DL
VLDLC SERPL CALC-MCNC: NORMAL MG/DL
WBC # BLD: NORMAL 10*3/UL

## 2021-09-21 DIAGNOSIS — Z00.00 HEALTH CARE MAINTENANCE: ICD-10-CM

## 2021-09-22 ENCOUNTER — TELEPHONE (OUTPATIENT)
Dept: FAMILY MEDICINE CLINIC | Age: 66
End: 2021-09-22

## 2021-09-22 DIAGNOSIS — R80.9 MICROALBUMINURIA: ICD-10-CM

## 2021-09-22 DIAGNOSIS — Z79.899 MEDICATION MANAGEMENT: Primary | ICD-10-CM

## 2021-09-22 NOTE — TELEPHONE ENCOUNTER
Medication Management Service ([address] [place] [address])  [facility]  [phone]     CLINICAL PHARMACY NOTE:      Phone call received from patient. Patient is wanting to set up a time to review blood glucose readings as she will be needing to request refills on DM medications. Patient has recently started Ozempic and has taken her 4th dose of the 0.25mg this past Monday. Patient reports having Covid over the last week and has had decreased appetite. Patient reports that she has returned back to work this week and has noticed that appetite is beginning to return to normal.  Over the last couple of weeks fasting blood glucose average was 146 and before supper average was 178. Patient reported blood glucose of 92 this morning and a reading of 190 before supper last evening around 5:30pm.  HgA1c complete on 09/09/2021 and was reported as 7.2%. Previously it was reported as 10.5% on 03/24/2021. Current DM medications. Medication Sig Dispense    Semaglutide,0.25 or 0.5MG/DOS, (OZEMPIC, 0.25 OR 0.5 MG/DOSE,) 2 MG/1.5ML SOPN Inject 0.25mg under the skin weekly for 4 weeks. Then increase to 0.5mg weekly. Taking as prescribed  4th dose of 0.25mg weekly taken on Monday, 09/20/2021.  metFORMIN (GLUCOPHAGE-XR) 500 MG extended release tablet Take 2 tablets by mouth 2 times daily (with meals) for 14 days Taking as prescribed      insulin NPH (HUMULIN N KWIKPEN) 100 UNIT/ML injection pen INJECT SUBCUTANEOUSLY 80  UNITS IN THE MORNING AND 60 UNITS IN THE EVENING Taking as prescribed    Patient continues to report missing evening dose particular while she was sick last week. Patient did take evening dose last night and had fasting blood glucose of 92 this morning. Patient is reporting that appetite is coming back. Plan will be for patient to continue with Ozempic titration to 0.5mg weekly starting on Monday 09/27/2021.   Will look to see if office has sample so that she can complete 4 weeks of the 0.5mg weekly dose before medication is filled by pharmacy as patient will need to request a 90 day supply. Medication Management will follow up with patient once if it is known if sample available for patient. Anticipating that patient will need a reduction in NPH dose with new dose of Ozempic. Information available recommends a 10-20% reduction in insulin dose. Addendum:  Office has samples of Ozempic. Sample placed aside for patient to . Phone call to patient to let her know that there is a sample waiting for pick-up. Blood glucose this morning was 121 and last evening was 198. With recent HgbA1c and plan to increase to Ozempic 0.5mg on Monday (9/27/2021) will decrease NPH insulin by about 15%. When patient takes dose of Ozempic 0.5mg she will decrease NPH insulin to 70 units in the morning and 50 units in the evening. Plan to follow up with patient by telephone in 2 weeks on [email or web address].  If patient begins to notice blood glucose readings out of range, patient will call writer for further adjustments in the NPH. Martell Saldaña, Pharm. D., [address] [facility] Medication Management Service  [phone]  9/23/2021  8:43 AM        =================================================================  For Pharmacy Admin Tracking Only     CPA in place:   Yes   Intervention Detail: Dose Adjustment: 2, reason: Therapy De-escalation, Therapy Optimization, Patient Access Assistance/Sample Provided and Refill(s) Provided   Intervention Accepted By: Patient/Caregiver: 4   Time Spent (min): 60

## 2021-09-22 NOTE — TELEPHONE ENCOUNTER
Julian Priest is calling to request a refill on the following medication(s):    Medication Request:  Requested Prescriptions     Pending Prescriptions Disp Refills    lisinopril (PRINIVIL;ZESTRIL) 2.5 MG tablet [Pharmacy Med Name: LISINOPRIL  2.5MG  TAB] 180 tablet 1     Sig: TAKE 2 TABLETS BY MOUTH  DAILY       Last Visit Date (If Applicable):  2/73/1446    Next Visit Date:    Visit date not found

## 2021-09-23 RX ORDER — LISINOPRIL 2.5 MG/1
5 TABLET ORAL DAILY
Qty: 180 TABLET | Refills: 1 | Status: SHIPPED | OUTPATIENT
Start: 2021-09-23 | End: 2022-02-28 | Stop reason: SDUPTHER

## 2021-09-23 RX ORDER — INSULIN HUMAN 100 [IU]/ML
INJECTION, SUSPENSION SUBCUTANEOUS
Qty: 135 ML | Refills: 3
Start: 2021-09-23 | End: 2021-10-21 | Stop reason: SDUPTHER

## 2021-09-23 RX ORDER — SEMAGLUTIDE 1.34 MG/ML
0.5 INJECTION, SOLUTION SUBCUTANEOUS
Qty: 1 PEN | Refills: 0 | COMMUNITY
Start: 2021-09-23 | End: 2021-10-21 | Stop reason: DRUGHIGH

## 2021-09-23 RX ORDER — GLUCOSAMINE HCL/CHONDROITIN SU 500-400 MG
CAPSULE ORAL
Qty: 100 STRIP | Refills: 3 | Status: SHIPPED | OUTPATIENT
Start: 2021-09-23 | End: 2021-12-06

## 2021-10-07 ENCOUNTER — VIRTUAL VISIT (OUTPATIENT)
Dept: FAMILY MEDICINE CLINIC | Age: 66
End: 2021-10-07

## 2021-10-07 DIAGNOSIS — I25.10 ASHD (ARTERIOSCLEROTIC HEART DISEASE): Primary | ICD-10-CM

## 2021-10-07 DIAGNOSIS — Z79.899 MEDICATION MANAGEMENT: Primary | ICD-10-CM

## 2021-10-07 DIAGNOSIS — E78.5 DYSLIPIDEMIA: ICD-10-CM

## 2021-10-07 PROCEDURE — 99999 PR OFFICE/OUTPT VISIT,PROCEDURE ONLY: CPT | Performed by: PHARMACIST

## 2021-10-07 PROCEDURE — APPNB30 APP NON BILLABLE TIME 0-30 MINS: Performed by: PHARMACIST

## 2021-10-07 RX ORDER — ASPIRIN 81 MG/1
TABLET ORAL
Qty: 90 TABLET | Refills: 1 | Status: SHIPPED | OUTPATIENT
Start: 2021-10-07 | End: 2022-02-28 | Stop reason: SDUPTHER

## 2021-10-07 RX ORDER — ATORVASTATIN CALCIUM 80 MG/1
80 TABLET, FILM COATED ORAL DAILY
Qty: 90 TABLET | Refills: 1 | Status: SHIPPED | OUTPATIENT
Start: 2021-10-07 | End: 2022-02-28 | Stop reason: SDUPTHER

## 2021-10-07 NOTE — Clinical Note
Hi Dr Gautam Thorpe,    Patient has taken 2 dose of Ozempic 0.5mg weekly. Blood glucose readings appear close to goal or at goal.  Patient continues to miss evening doses of NPH with noticeable higher fasting readings the next day. Patient is going to work on adherence with evening dose. Since patient has just taken her second dose of ozempic 0.5mg weekly on Monday. Anticipating some further glucose reduction over next 2 weeks. Made a small adjustment in evening NPH from 50 units to 46 units. Plan to continue with Ozempic 0.5mg weekly and metformin. Telephone call in 2 weeks. At that time will need to consider increasing Ozempic to 1mg weekly and then further reducing NPH dose. Valerie Baker, Pharm. D., [address] [facility] Medication Management Service  [phone]  10/7/2021  11:49 AM

## 2021-10-07 NOTE — PROGRESS NOTES
Medication Management Service  [facility]  [phone]      Mela Officer is a 61 y.o. female who spoke with pharmacy for management of DM under consult agreement per referral from Elia Shankar DO. Subjective/Objective     New Problems/Changes: Elevated HgbA1c last reported as 10.5% on 03/24/2021. DIET  Types of food patient reports eating includes chicken sandwich with milkshake, Hamburger with brussel sprouts, beef stir lake, pizza. Patient has made some changes to diet over the last few months. More aware of carbohydrates and servings sizes. Patient talked of how her portion sizes are smaller. Even noticed some weight loss-approximately 10lbs. EXERCISE  Patient enjoys swimming and has swimming pool in her back yard. Patient cuts the grass and talks of doing house work like vacuuming. 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 WEIGHT     103.6 kg-07/15/2021  102.2 kg-6/17/2021    DIABETES MANAGEMENT  Most Recent A1c:   09/21/2021-7.2%  10.5% on 03/24/2021    Home Glucose Readings     Patient reported her average fasting blood glucose over the last 14 days as being 121. Average evening readings (before dinner) was 155. Lowest reading was 97 as a fasting value. Hypoglycemia: No reports of hypoglycemia signs/symptoms by patient. Microalbumin/Creatinine Ratio:   Microalbumin Creatinine Ratio reported as  on 03/29/2021. Hyperlipidemia Medications:   Atorvastatin 80mg daily    On Statin: Yes    On Aspirin: Yes    On ACE-I or ARB for HTN or Microalbuminuria: Yes-Lisinopril 2.5mg-2 tablets daily. Medication Adherence/Cost/Adverse Events: Patient is self insured. Continues to miss a some evening dosing of insulin. MEDICATIONS    New/Discontinued medications since last encounter: Ozempic dose increased to 0.5mg week starting on 09/27/2021    Diabetes Medications:     Current DM medications.   Medication Sig Dispense    Semaglutide,0.25 or 0.5MG/DOS, (OZEMPIC, 0.25 OR 0.5 MG/DOSE,) 2 MG/1.5ML SOPN Inject 0.25mg under the skin weekly for 4 weeks. Then increase to 0.5mg weekly. Taking as prescribed  Started 0.5mg weekly on 09/27/2021. This past Monday, 10/4 patient took second dose. No adverse effects noted. Patient mentioned that her portion sizes seem to be less. In addition she has also lost weight (approximately 10lbs).  metFORMIN (GLUCOPHAGE-XR) 500 MG extended release tablet Take 2 tablets by mouth 2 times daily (with meals) for 14 days Taking as prescribed      insulin NPH (HUMULIN N KWIKPEN) 100 UNIT/ML injection pen INJECT SUBCUTANEOUSLY 70  UNITS IN THE MORNING AND 50 UNITS IN THE EVENING Taking as prescribed  Patient continues to admit missing a couple of evening doses per week. Patient felt like she did better getting these doses in over the last 14 days. Patient also noted that fasting blood glucose readings were higher on the mornings that she did not take evening dose of NPH. Ozempic titration to 0.5mg weekly started on Monday 09/27/2021. Patient took second dose on 10/4/2021. Assessment/Plan     Diabetes Management:   Overall blood glucose readings appear to be at goal. No reported lows. Patient took second dose of 0.5mg of Ozempic on 10/04/2021. Anticipating further blood glucose lowing over the next couple of weeks. Will make small adjustment in evening NPH dose to prevent lower readings in the morning. Patient will continue with NPH 70 units in the morning and decrease evening to 46 units. Continue with Ozempic 0.5mg weekly for x 2 more doses. Will need to evaluate need to increase further to 1mg week. No changes to metformin. Telephone follow up call in 2 weeks [email or web address]). Patient will continue to check blood glucose twice daily and for signs/symptoms of hypoglycemia.   Patient to work on diet management, consistent exercise/activity, and adherence with prescribed medications (Particularly with evening dose of NPH). Next Follow-Up: Next PCP appointment has not been scheduled. Patient verbalized understanding of care plan. Patient advised to call Medication Management with any questions, concerns, or changes prior to next appointment. Garrett Collins, Pharm. D., [address] [facility] Medication Management Service  [phone]  10/7/2021  11:22 AM    ======================================================================================  For Pharmacy Admin Tracking Only     CPA in place:   Yes   Intervention Detail: Dose Adjustment: 1, reason: Therapy De-escalation   Intervention Accepted By: Patient/Caregiver: 1   Time Spent (min): 30

## 2021-10-21 ENCOUNTER — VIRTUAL VISIT (OUTPATIENT)
Dept: FAMILY MEDICINE CLINIC | Age: 66
End: 2021-10-21

## 2021-10-21 DIAGNOSIS — Z79.899 MEDICATION MANAGEMENT: Primary | ICD-10-CM

## 2021-10-21 PROCEDURE — 99999 PR OFFICE/OUTPT VISIT,PROCEDURE ONLY: CPT | Performed by: PHARMACIST

## 2021-10-21 PROCEDURE — APPNB45 APP NON BILLABLE 31-45 MINUTES: Performed by: PHARMACIST

## 2021-10-21 RX ORDER — SEMAGLUTIDE 1.34 MG/ML
1 INJECTION, SOLUTION SUBCUTANEOUS
Qty: 3 ML | Refills: 3 | Status: SHIPPED | OUTPATIENT
Start: 2021-10-21 | End: 2021-12-02 | Stop reason: SDUPTHER

## 2021-10-21 RX ORDER — INSULIN HUMAN 100 [IU]/ML
INJECTION, SUSPENSION SUBCUTANEOUS
Qty: 90 ML | Refills: 3
Start: 2021-10-21 | End: 2021-11-04 | Stop reason: SDUPTHER

## 2021-10-21 NOTE — PROGRESS NOTES
Medication Management Service  [facility]  [phone]      Eleazar De La Cruz is a 61 y.o. female who spoke with pharmacy for management of DM under consult agreement per referral from Lucy Carolina DO. Subjective/Objective     New Problems/Changes: Elevated HgbA1c last reported as 10.5% on 03/24/2021. DIET  Types of food patient reports eating includes chicken sandwich with milkshake, Hamburger with brussel sprouts, beef stir lake, pizza. Patient has made some changes to diet over the last few months. More aware of carbohydrates and servings sizes. Patient talked of how her portion sizes are smaller. Even noticed some weight loss-approximately 10lbs. Did have a couple of cheat moments over the last couple of weeks and patient reports that those moments did result in higher blood glucose readings. EXERCISE  Patient enjoys swimming and has swimming pool in her back yard. Pool is now closed. Patient cuts the grass and talks of doing house work like vacuuming. 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 She also reports more walking. Patient's friend will be giving her an elliptical bike. Patient is excited to have the equipment. WEIGHT   103.6 kg-07/15/2021  102.2 kg-6/17/2021    DIABETES MANAGEMENT  Most Recent A1c:   09/21/2021-7.2%  10.5% on 03/24/2021    Home Glucose Readings  Patient reports blood glucose readings have ranged from  over the last couple of weeks. Fasting range has been 92-207s. Before dinner patient reports readings as being 106, 133, and 154. Before bedtime patient reports readings of 278 and 169. Hypoglycemia: No reports of hypoglycemia signs/symptoms by patient. Microalbumin/Creatinine Ratio:   Microalbumin Creatinine Ratio reported as  on 03/29/2021.     Hyperlipidemia Medications:   Atorvastatin 80mg daily    On Statin: Yes    On Aspirin: Yes    On ACE-I or ARB for HTN or Microalbuminuria: Yes-Lisinopril 2.5mg-2 tablets daily. Medication Adherence/Cost/Adverse Events: Patient is self insured. Continues to miss a some evening dosing of insulin. MEDICATIONS    New/Discontinued medications since last encounter: Ozempic dose increased to 0.5mg week starting on 09/27/2021    Diabetes Medications:     Current DM medications. Medication Sig Dispense    Semaglutide,0.25 or 0.5MG/DOS, (OZEMPIC, 0.25 OR 0.5 MG/DOSE,) 2 MG/1.5ML SOPN Inject 0.25mg under the skin weekly for 4 weeks. Then increase to 0.5mg weekly. Taking as prescribed  Started 0.5mg weekly on 09/27/2021. This past Monday, 10/18 was patient's 4th dose. No adverse effects noted.  metFORMIN (GLUCOPHAGE-XR) 500 MG extended release tablet Take 2 tablets by mouth 2 times daily (with meals) for 14 days Taking as prescribed      insulin NPH (HUMULIN N KWIKPEN) 100 UNIT/ML injection pen INJECT SUBCUTANEOUSLY 70  UNITS IN THE MORNING AND 46 UNITS IN THE EVENING Taking as prescribed. Patient continues to report missing 1-2 doses per week. Ozempic titration to 0.5mg weekly started on Monday 09/27/2021. Patient took 4th dose on 10/18/2021. Assessment/Plan     Diabetes Management:   Overall, most blood glucose readings appear to be at goal or slightly above goal. No reported lows. Patient took 4th dose of 0.5mg of Ozempic on 10/18/2021. Appears that there may still be some opportunity to achieve blood glucose goals particularly a HgbA1c of less than 7%. Patient expressed interest in trying to reduce the amount of NPH needed daily particularly since she will miss the evening doses 1-2 times per week. May be able to consider newer once daily basal analog insulin product in the future (insulin glargine or degludec). Also patient has reported some weight loss since starting Ozempic. Discussion around the potential for further weight reduction with further increasing Ozempic to 1 mg weekly. Plan to increase Ozempic to 1mg weekly.   Will also decrease NPH by 15% to 60 units in the AM and 40 units in the evening. Telephone call in 2 weeks to evaluate blood glucose readings and how well patient is tolerating increased Ozempic dose. Patient will continue to check blood glucose at least twice daily or for signs/symptoms of hypoglycemia. Patient will continue to work on diet management, consistent exercise/activity, and adherence with prescribed medications (Particularly with evening dose of NPH). Prescription sent to [facility] for Ozempic 1mg. Patient will follow up with cost.  If patient is able to tolerate 1mg weekly dose will send new Rx to [facility]. Next Follow-Up: Next PCP appointment has not been scheduled. Meds Mgmt Telephone call on [email or web address]    Patient verbalized understanding of care plan. Patient advised to call Medication Management with any questions, concerns, or changes prior to next appointment. Devan Torres, Pharm. D., [address] Medication Management Service  [phone]  10/21/2021  10:53 AM    ======================================================================================>For Pharmacy Admin Tracking Only     CPA in place:   Yes   Recommendation Provided To: Patient/Caregiver: 3 via Telephone   Intervention Detail: Dose Adjustment: 2, reason: Therapy De-escalation, Therapy Optimization and Refill(s) Provided    Intervention Accepted By: Patient/Caregiver: 3   Time Spent (min): 45

## 2021-10-21 NOTE — Clinical Note
Hi Dr Brian Burgos with patient today. Blood glucose readings appear to be close to goal for both fasting and post prandial.  When above goal often due to food choices or even missing evening dose of NPH. Patient has been on Ozempic 0.5mg weekly for 4 weeks. Will increase to 1mg weekly. New Rx sent to [facility].  NPH insulin decreased by 15% to be started when patient starts Ozemoic 1mg weekly. New NPH regimen 60 units in the AM and 40 units in PM.  In the future would love to switch insulin from BID NPH to daily analog (glargine or degludec). Nothing until we finish titrating Ozempic. Next follow by telephone in 2 weeks to make sure patient was able to start Ozempic 1mg weekly. Rose Cespedes, Pharm. D., [address] [facility] Medication Management Service  [phone]  10/21/2021  4:31 PM

## 2021-11-04 ENCOUNTER — VIRTUAL VISIT (OUTPATIENT)
Dept: FAMILY MEDICINE CLINIC | Age: 66
End: 2021-11-04

## 2021-11-04 DIAGNOSIS — Z79.899 MEDICATION MANAGEMENT: Primary | ICD-10-CM

## 2021-11-04 PROCEDURE — 99999 PR OFFICE/OUTPT VISIT,PROCEDURE ONLY: CPT | Performed by: PHARMACIST

## 2021-11-04 PROCEDURE — APPNB30 APP NON BILLABLE TIME 0-30 MINS: Performed by: PHARMACIST

## 2021-11-04 RX ORDER — INSULIN HUMAN 100 [IU]/ML
INJECTION, SUSPENSION SUBCUTANEOUS
Qty: 90 ML | Refills: 3
Start: 2021-11-04 | End: 2021-12-02 | Stop reason: SDUPTHER

## 2021-11-04 NOTE — Clinical Note
Hi Dr Brian Burgos with patient by telephone. Blood glucose readings appear to be in goal.  Although lower morning reading of 78 noted for one day. Patient has increased Ozempic to 1mg weekly. Noted some GI effects. Patient would like to continue on Ozempic and see if the GI side effects improve. Patient took second dose on Monday. With lower reading of 78 and that patient continues to miss evening dose of NPH a couple of times per week. Decided to change NPH to 60 units in the morning and 30 in the evening. (Previously 60 in AM and 40 in PM). Telephone follow up in 2 weeks. Still planning to switch insulin in the future to a basal analog product once Ozempic titration is completed. Would love Natalie Guajardo but will have to wait and see what the cost will be. Rose Cespedes, Pharm. D., [address] [facility] Medication Management Service  [phone]  11/4/2021  11:01 AM

## 2021-11-04 NOTE — PROGRESS NOTES
Medication Management Service  [facility]  [phone]      Wilder Smith is a 61 y.o. female who spoke with pharmacy for management of DM under consult agreement per referral from Shaquille Menendez DO. Subjective/Objective     New Problems/Changes: Elevated HgbA1c last reported as 10.5% on 03/24/2021. DIET  Types of food patient reports eating includes chicken sandwich with milkshake, Hamburger with brussel sprouts, beef stir lake, pizza. Patient has made some changes to diet over the last few months. More aware of carbohydrates and servings sizes. Patient talked of how her portion sizes are smaller. Even noticed some weight loss-approximately 10lbs. Did have a couple of cheat moments over the last couple of weeks and patient reports that those moments did result in higher blood glucose readings. No changes noted to diet. EXERCISE  Patient enjoys swimming and has swimming pool in her back yard. Pool is now closed. Patient cuts the grass and talks of doing house work like vacuuming. 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 She also reports more walking. Patient's friend will be giving her an elliptical bike. Patient is excited to have the equipment. No changes noted. WEIGHT   103.6 kg-07/15/2021  102.2 kg-6/17/2021    DIABETES MANAGEMENT  Most Recent A1c:   09/21/2021-7.2%  10.5% on 03/24/2021    Home Glucose Readings  Patient reports blood glucose readings have ranged from  over the last couple of weeks. Average morning readings reported as 130. With last call, patient reports that the average was 141. Evening average reported as 161. Sometimes patient checks before dinner. Other times it will be after eating. Previously noted to be 173 with last phone call. Hypoglycemia: No reports of hypoglycemia signs/symptoms by patient. Patient did report a morning reading of 78.   Patient continues to miss evening insulin a couple of times per week and reports that fasting readings were as followed the morning after missing 104, 125, and 153. Microalbumin/Creatinine Ratio:   Microalbumin Creatinine Ratio reported as  on 03/29/2021. Hyperlipidemia Medications:   Atorvastatin 80mg daily    On Statin: Yes    On Aspirin: Yes    On ACE-I or ARB for HTN or Microalbuminuria: Yes-Lisinopril 2.5mg-2 tablets daily. Medication Adherence/Cost/Adverse Events: Patient is self insured. Continues to miss a some evening dosing of insulin. MEDICATIONS    New/Discontinued medications since last encounter: Ozempic dose increased to 1 mg week starting on 10/25/2021.  2 doses given on both 10/25/2021 and 11/1/2021. Patient reports having diarrhea along with loose stools. Noticed that appetite is less and that she is eating less. Diabetes Medications:     Current DM medications. Medication Sig Comments     Semaglutide, 1 MG/DOSE, (OZEMPIC, 1 MG/DOSE,) 4 MG/3ML SOPN Inject 1 mg into the skin every 7 days Taking as prescribed  Started 1mg weekly on 10/25/2021. T2nd dose given on 11/1/2021. Some GI effects noted (diarrhea every couple of days, loose stools, smaller appetite, with smaller portion sizes noted.)    insulin NPH (HUMULIN N KWIKPEN) 100 UNIT/ML injection pen INJECT SUBCUTANEOUSLY 60 UNITS IN THE MORNING AND 40 UNITS IN THE EVENING Taking as prescribed. Patient continues to report missing 1-2 doses per week.  metFORMIN (GLUCOPHAGE-XR) 500 MG extended release tablet Take 2 tablets by mouth 2 times daily (with meals) for 14 days Taking as prescribed         Assessment/Plan     Diabetes Management:   Overall, most blood glucose readings appear to be at goal or slightly above goal. Lower reading of 78 reported once in the morning. Patient mentioned that she ate something and felt better. With lower blood glucose reading and continue missed doses of evening insulin.   Patient has taken 2nd dose of Ozempic 1mg and expect to see some additional lowering of blood glucose over the next couple of weeks. Consideration given to some of the GI effects noted since increasing Ozempic to 1mg weekly. Benefits of continuing titration discussed with patient. Recommended eating 4-5 times per day (smaller meals). Patient is excited about the weigh loss possibility as long as quality of life is minimally impacted. Will make an insulin adjustment. Plan for patient to continue with 60 units in the morning and then decrease evening NPH to 30 units. Patient will continue with metformin and Ozempic 1mg weekly. Will continue to monitor for adverse effects with Ozempic therapy. Telephone call in 2 weeks to evaluate blood glucose readings and how well patient is tolerating increased Ozempic dose. Patient will continue to check blood glucose at least twice daily or for signs/symptoms of hypoglycemia. Patient will continue to work on diet management, consistent exercise/activity, and adherence with prescribed medications (Particularly with evening dose of NPH). Plan to consider newer once daily basal analog insulin product in the future (insulin glargine or degludec) once dose titration with Ozempic is completed. No prescription sent to mail order as patient has had some GI side effects with increasing the dose to 1mg weekly. Patient will fill at [facility] for 1 more month then if tolerating 1mg dose will send Rx at that time. Next Follow-Up: Next PCP appointment has not been scheduled. [facility] Mgmt Telephone call on [email or web address]    Patient verbalized understanding of care plan. Patient advised to call Medication Management with any questions, concerns, or changes prior to next appointment. Steve Magaña, Pharm. D., [address] [facility] Medication Management Service  [phone]  11/4/2021  10:40 AM    =================================================  For Pharmacy Admin Tracking Only     CPA in place: Yes   Recommendation Provided To: Patient/Caregiver: 1 via Telephone   Intervention Detail: Dose Adjustment: 1, reason: Therapy De-escalation   Intervention Accepted By: Patient/Caregiver: 1   Time Spent (min): 30

## 2021-11-16 DIAGNOSIS — E11.9 TYPE 2 DIABETES MELLITUS WITHOUT COMPLICATION, UNSPECIFIED WHETHER LONG TERM INSULIN USE (HCC): ICD-10-CM

## 2021-11-17 RX ORDER — METFORMIN HYDROCHLORIDE 500 MG/1
TABLET, EXTENDED RELEASE ORAL
Qty: 360 TABLET | Refills: 3 | Status: SHIPPED | OUTPATIENT
Start: 2021-11-17 | End: 2021-12-02 | Stop reason: SDUPTHER

## 2021-11-17 NOTE — TELEPHONE ENCOUNTER
Carolina Lemos is calling to request a refill on the following medication(s):    Medication Request:  Requested Prescriptions     Pending Prescriptions Disp Refills    metFORMIN (GLUCOPHAGE-XR) 500 MG extended release tablet [Pharmacy Med Name: metFORMIN HCl  MG Oral Tablet Extended Release 24 Hour] 360 tablet 3     Sig: TAKE 2 TABLETS BY MOUTH  TWICE DAILY WITH MEALS       Last Visit Date (If Applicable):  6/54/5908    Next Visit Date:    Visit date not found

## 2021-11-18 ENCOUNTER — VIRTUAL VISIT (OUTPATIENT)
Dept: FAMILY MEDICINE CLINIC | Age: 66
End: 2021-11-18

## 2021-11-18 DIAGNOSIS — Z79.899 MEDICATION MANAGEMENT: Primary | ICD-10-CM

## 2021-11-18 PROCEDURE — 99999 PR OFFICE/OUTPT VISIT,PROCEDURE ONLY: CPT | Performed by: PHARMACIST

## 2021-11-18 PROCEDURE — APPNB30 APP NON BILLABLE TIME 0-30 MINS: Performed by: PHARMACIST

## 2021-11-18 NOTE — PROGRESS NOTES
Medication Management Service  [facility]  [phone]      Ruby Fernandez is a 61 y.o. female who spoke with pharmacy for management of DM under consult agreement per referral from Jorge Luis Smart DO. Subjective/Objective     New Problems/Changes: Elevated HgbA1c last reported as 10.5% on 03/24/2021. DIET  Types of food patient reports eating includes chicken sandwich with milkshake, Hamburger with brussel sprouts, beef stir lake, pizza. Patient has made some changes to diet over the last few months. More aware of carbohydrates and servings sizes. Patient talked of how her portion sizes are smaller. No changes noted to diet. Noticed decrease in appetite. EXERCISE  Patient enjoys swimming and has swimming pool in her back yard. Pool is now closed. Patient cuts the grass and talks of doing house work like vacuuming. 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 She also reports more walking. Has been attending craft shows. Patient's friend has given her an elliptical bike. Patient is excited to have the equipment. But has not consistently used bike because she has not had the tv hooked up in the room with the bike. Patient feels that once the tv is hooked up it will be easy to ride the the bike. WEIGHT   103.6 kg-07/15/2021  102.2 kg-6/17/2021    DIABETES MANAGEMENT  Most Recent A1c:   09/21/2021-7.2%  10.5% on 03/24/2021    Home Glucose Readings  Patient reports average morning blood readings reported as 103. Evening average reported as 139. Hypoglycemia: No reports of hypoglycemia signs/symptoms by patient. Patient did report a morning reading of 74 and 85. Patient continues to miss evening insulin a few times since last phone call. Patient reports that fasting readings were as followed the morning after missing these evening doses 117,101,106, 95,125.         Microalbumin/Creatinine Ratio:   Microalbumin Creatinine Ratio reported as  once daily basal analog insulin product in the future (insulin glargine or degludec) once dose titration with Ozempic is completed. Will consider with changing insulin with next follow up appointment on 12/2/2021. No prescription sent to mail order as patient has had some GI side effects with increasing the dose to 1mg weekly. Patient will fill at [facility] for 1 more month then if tolerating 1mg dose will send Rx at that time. Consider with next telephone call. Next Follow-Up: Next PCP appointment has not been scheduled. Patient has been noticing some SOB and is wanting recommendation regarding available OTC inhalers. Patient with past smoking history (quit 10 years ago) and recent case of Covid. Recommendation made to patient to schedule a PCP appointment for evaluation. Meds Mgmt Telephone call on [email or web address]    Patient verbalized understanding of care plan. Patient advised to call Medication Management with any questions, concerns, or changes prior to next appointment. Jer Grewal, Pharm. D., [address] [facility] Medication Management Service  [phone]  11/18/2021  9:03 AM    =================================================  For Pharmacy Admin Tracking Only     CPA in place:   Yes   Recommendation Provided To: Patient/Caregiver: 1 via Telephone   Intervention Detail: Dose Adjustment: 1, reason: Therapy De-escalation   Intervention Accepted By: Patient/Caregiver: 1   Time Spent (min): 30

## 2021-11-18 NOTE — Clinical Note
Hi Dr Roxann Galindo,    Patient is on week 5 of Ozempic 1mg weekly and tolerating therapy well. Fasting blood glucose average reported as 103. Evening average 139. A couple of lower readings of 74 and 85 also noted on the mornings that she remembered to take evening dose of NPH insulin. Patient admits to missing at least 6 evening doses of NPH over the last 2 weeks. Plan to continue with Ozempic, metformin, and NPH 60 units in the morning only. Feels like patient misses evening dose almost as much as she takes it and then noticing some lower readings in the morning. Telephone follow up in 2 weeks. May be able to transition NPH to newer basal insulin with next call. FYI: Patient mentions some new shortness of breath.   Having her follow u

## 2021-11-18 NOTE — Clinical Note
Omar Mueller,    So sorry. It looks my note routed to you and it wasn't quite complete. Now it should be good. Quick summary. Patient is has been on Ozempic 1mg for about 5 weeks. Continues to miss nightly dose of NPH a few times per week with blood glucose readings still in range the next morning. Decided to stop the evening NPH dose. Continue with 60 units NPH in the morning, Ozempic 1mg weekly, and metformin XR 1000mg twice daily. Follow up by telephone in 2 weeks. If fasting readings go up will adjust insulin and probably change over to an analog basal product. Patient talked of some new shortness of breath and was asking about OTC inhalers. Recommended that she schedule a PCP appointment rather than use an OTC inhaler like Primatene. Patient seemed agreeable to scheduling one. Wayne Navarrete [place]

## 2021-11-29 NOTE — TELEPHONE ENCOUNTER
[facility] is calling to request a refill on the following medication(s):    Medication Request:  Requested Prescriptions     Pending Prescriptions Disp Refills    metoprolol tartrate (LOPRESSOR) 25 MG tablet [Pharmacy Med Name: Metoprolol Tartrate 25 MG Oral Tablet] 90 tablet 1     Sig: TAKE 1 TABLET BY MOUTH  DAILY       Last Visit Date (If Applicable):  60/57/3677    Next Visit Date:    Visit date not found

## 2021-12-02 ENCOUNTER — VIRTUAL VISIT (OUTPATIENT)
Dept: FAMILY MEDICINE CLINIC | Age: 66
End: 2021-12-02

## 2021-12-02 ENCOUNTER — TELEPHONE (OUTPATIENT)
Dept: FAMILY MEDICINE CLINIC | Age: 66
End: 2021-12-02

## 2021-12-02 DIAGNOSIS — E11.9 TYPE 2 DIABETES MELLITUS WITHOUT COMPLICATION, UNSPECIFIED WHETHER LONG TERM INSULIN USE (HCC): ICD-10-CM

## 2021-12-02 DIAGNOSIS — Z79.899 MEDICATION MANAGEMENT: Primary | ICD-10-CM

## 2021-12-02 PROCEDURE — 99999 PR OFFICE/OUTPT VISIT,PROCEDURE ONLY: CPT | Performed by: PHARMACIST

## 2021-12-02 RX ORDER — SEMAGLUTIDE 1.34 MG/ML
1 INJECTION, SOLUTION SUBCUTANEOUS
Qty: 9 ML | Refills: 1 | Status: SHIPPED | OUTPATIENT
Start: 2021-12-02

## 2021-12-02 RX ORDER — INSULIN HUMAN 100 [IU]/ML
INJECTION, SUSPENSION SUBCUTANEOUS
Qty: 90 ML | Refills: 3
Start: 2021-12-02

## 2021-12-02 RX ORDER — METFORMIN HYDROCHLORIDE 500 MG/1
1000 TABLET, EXTENDED RELEASE ORAL
Qty: 180 TABLET | Refills: 3
Start: 2021-12-02

## 2021-12-02 NOTE — PROGRESS NOTES
Medication Management Service  [facility]  [phone]      Miranda Olivares is a 61 y.o. female who spoke with pharmacy for management of DM under consult agreement per referral from Patrice Nicole DO. Subjective/Objective     New Problems/Changes: Elevated HgbA1c last reported as 10.5% on 03/24/2021. DIET  Types of food patient reports eating includes chicken sandwich with milkshake, Hamburger with brussel sprouts, beef stir lake, pizza. Patient has made some changes to diet over the last few months. More aware of carbohydrates and servings sizes. Patient talked of how her portion sizes are smaller. No changes noted to diet. EXERCISE  Patient enjoys swimming and has swimming pool in her back yard. Pool is now closed. Patient cuts the grass and talks of doing house work like vacuuming. 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 She also reports more walking. Has been attending craft shows. Patient's friend has given her an elliptical bike. Patient is excited to have the equipment. But has not consistently used bike because she has not had the tv hooked up in the room with the bike. Patient feels that once the tv is hooked up it will be easy to ride the the bike. No changes noted during call. WEIGHT   103.6 kg-07/15/2021  102.2 kg-6/17/2021    DIABETES MANAGEMENT  Most Recent A1c:   09/21/2021-7.2%  10.5% on 03/24/2021    Home Glucose Readings  Patient reports average morning blood readings reported as 136. Evening average reported as 140 . Hypoglycemia: No reports of hypoglycemia signs/symptoms by patient. Did have a lower reading (79mg/dL) on 11/24/2021. Patient talked of being more active that day preparing for Thanksgiving. Microalbumin/Creatinine Ratio:   Microalbumin Creatinine Ratio reported as  on 03/29/2021.     Hyperlipidemia Medications:   Atorvastatin 80mg daily    On Statin: Yes    On Aspirin: Yes    On ACE-I or ARB medications. Plan to consider newer once daily basal analog insulin product in the future (insulin glargine or degludec) maybe once a decision is made with maximizing metformin regimen. Ozempic prescription sent to mail order as patient as patient has had no further reports of GI effects with increase in dose. Next Follow-Up: Next PCP appointment has not been scheduled. Patient will schedule next PCP appointment. Meds Mgmt Telephone call on [email or web address]    Patient verbalized understanding of care plan. Patient advised to call Medication Management with any questions, concerns, or changes prior to next appointment. Cleo Escudero, Pharm. D., [address] [facility] Medication Management Service  [phone]  12/2/2021  8:58 AM    =================================================  For Pharmacy Admin Tracking Only     CPA in place:   Yes   Recommendation Provided To: Patient/Caregiver: 2 via Telephone   Intervention Detail: Dose Adjustment: 1, reason: Patient Preference and Refill(s) Provided   Intervention Accepted By: Patient/Caregiver: 2   Time Spent (min): 30

## 2021-12-02 NOTE — TELEPHONE ENCOUNTER
Patient spoke with pharmacist Mary Lou Baires and said she tried calling for an appt for SOB and that she was given the run around. There are not any messaged in her chart that she called. She wanted to make sure we were aware of what happened.

## 2021-12-02 NOTE — PROGRESS NOTES
Agreed with the 4 tabs in AM approach, but since readings are at goal we can wait to see if that increased dosage is needed in the future

## 2021-12-06 RX ORDER — PERPHENAZINE 16 MG/1
TABLET, FILM COATED ORAL
Qty: 300 STRIP | Refills: 3 | Status: SHIPPED | OUTPATIENT
Start: 2021-12-06

## 2021-12-06 NOTE — TELEPHONE ENCOUNTER
[facility] Sal[facility] is calling to request a refill on the following medication(s):    Medication Request:  Requested Prescriptions     Pending Prescriptions Disp Refills    blood glucose test strips (CONTOUR NEXT TEST) strip [Pharmacy Med Name: CONTOUR NEXT TEST STRIP] 300 strip 3     Sig: USE TO TEST TWICE DAILY AND AS NEEDED FOR SYMPTOMS OF  IRREGULAR BLOOD GLUCOSE       Last Visit Date (If Applicable):  2/37/7796    Next Visit Date:    Visit date not found

## 2021-12-16 DIAGNOSIS — E78.5 DYSLIPIDEMIA: ICD-10-CM

## 2021-12-16 RX ORDER — FENOFIBRATE 160 MG/1
160 TABLET ORAL DAILY
Qty: 90 TABLET | Refills: 1 | Status: SHIPPED | OUTPATIENT
Start: 2021-12-16 | End: 2022-02-28 | Stop reason: SDUPTHER

## 2021-12-16 NOTE — TELEPHONE ENCOUNTER
[facility]ica [facility] is calling to request a refill on the following medication(s):    Medication Request:  Requested Prescriptions     Pending Prescriptions Disp Refills    fenofibrate (TRIGLIDE) 160 MG tablet [Pharmacy Med Name: Fenofibrate 160 MG Oral Tablet] 90 tablet 1     Sig: TAKE 1 TABLET BY MOUTH  DAILY       Last Visit Date (If Applicable):  2/45/5098    Next Visit Date:    Visit date not found

## 2022-01-06 ENCOUNTER — VIRTUAL VISIT (OUTPATIENT)
Dept: FAMILY MEDICINE CLINIC | Age: 67
End: 2022-01-06

## 2022-01-06 DIAGNOSIS — Z79.899 MEDICATION MANAGEMENT: Primary | ICD-10-CM

## 2022-01-06 PROCEDURE — 99999 PR OFFICE/OUTPT VISIT,PROCEDURE ONLY: CPT | Performed by: PHARMACIST

## 2022-01-06 PROCEDURE — APPNB30 APP NON BILLABLE TIME 0-30 MINS: Performed by: PHARMACIST

## 2022-01-06 NOTE — PROGRESS NOTES
Medication Management Service  [facility]  [phone]      Issa Stapleton is a 61 y.o. female who spoke with pharmacy for management of DM under consult agreement per referral from Khoi Perez DO. Subjective/Objective     New Problems/Changes: No new problems noted. HgbA1c last reported as 7.2% on 09/09/2021. DIET  Types of food patient reports eating includes chicken sandwich with milkshake, Hamburger with brussel sprouts, beef stir lake, pizza. Patient has made some changes to diet over the last few months. More aware of carbohydrates and servings sizes. Patient talked of how her portion sizes are smaller. No changes noted to diet. EXERCISE  Patient enjoys swimming and has swimming pool in her back yard. Pool is now closed. Patient cuts the grass and talks of doing house work like vacuuming. 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 She also reports more walking. Has been attending craft shows. Patient's friend has given her an elliptical bike. Patient is excited to have the equipment. But has not consistently used bike because she has not had the tv hooked up in the room with the bike. Patient feels that once the tv is hooked up it will be easy to ride the the bike. Been active with day to day activities. Has not been able to set up exercise bike. Plans to do so soon. Overall patient is feeling goods and her days are full of activity not necessarily as formal exercise program.    WEIGHT   103.6 kg-07/15/2021  102.2 kg-6/17/2021    DIABETES MANAGEMENT  Most Recent A1c:   09/21/2021-7.2%  10.5% on 03/24/2021    Home Glucose Readings  Patient reports average morning blood readings reported as 130. Highest reading recorded was 211mg/dL over the last 34 days and was from 12/31/2021. Lowest was 89mg/dL. No other readings in the 200s noted since last encounter. Hypoglycemia: No reports of hypoglycemia signs/symptoms by patient.   As noted above, lowest reading was 89mg/dL. Microalbumin/Creatinine Ratio:   Microalbumin Creatinine Ratio reported as  on 03/29/2021. Hyperlipidemia Medications:   Atorvastatin 80mg daily    On Statin: Yes    On Aspirin: Yes    On ACE-I or ARB for HTN or Microalbuminuria: Yes-Lisinopril 2.5mg-2 tablets daily. Medication Adherence/Cost/Adverse Events: Patient is self insured. No missed doses noted during this encounter. Patient has been adherent with prescribed medications. MEDICATIONS    New/Discontinued medications since last encounter: Ozempic dose increased to 1 mg week starting on 10/25/2021. Continues to noticed a decrease in appetite. Patient reports that her weight is down to 207 lbs. Diabetes Medications:     Current DM medications. Medication Sig Comments     Semaglutide, 1 MG/DOSE, (OZEMPIC, 1 MG/DOSE,) 4 MG/3ML SOPN Inject 1 mg into the skin every 7 days Taking as prescribed  Started 1mg weekly (Monday) on 10/25/2021. No GI side effects reported. Overall patient feels she is doing well with Ozempic.  insulin NPH (HUMULIN N KWIKPEN) 100 UNIT/ML injection pen INJECT SUBCUTANEOUSLY 60 UNITS IN THE MORNING. Taking as prescribed.  metFORMIN (GLUCOPHAGE-XR) 500 MG extended release tablet Take 2 tablets by mouth daily with breakfast Taking as prescribed          Assessment/Plan     Diabetes Management:   Blood glucose readings appear to be at goal.  Plan for patient to continue with Ozempic 1mg weekly, 60 units of NPH in the morning and metformin XR 500mg tablets 2 tablets daily. Telephone call in about 1 month to evaluate blood glucose readings. Patient will continue to check blood glucose at least twice daily or for signs/symptoms of hypoglycemia. Patient will continue to work on diet management, consistent exercise/activity, and adherence with prescribed medications.     Plan to consider newer once daily basal analog insulin product in the future (insulin glargine or degludec) maybe once a decision is made with maximizing metformin regimen. Next Follow-Up: Next PCP appointment has not been scheduled. Patient will schedule next PCP appointment. Meds Mgmt Telephone call on [email or web address]    Patient verbalized understanding of care plan. Patient advised to call Medication Management with any questions, concerns, or changes prior to next appointment. Todd Baldwin, Pharm. D., [address] [facility] Medication Management Service  [phone]  1/6/2022  9:03 AM    =================================================  For Pharmacy Admin Tracking Only    CPA in place:  Yes  Time Spent (min): 30

## 2022-01-06 NOTE — Clinical Note
Omar Martins,    Patient is doing great. Blood glucose readings appear to be at goal.  No lows reported. Additionally patient reports her weight is down to 207lbs. No changes made to DM medications. Patient will call office to scheduled PCP appointment. Would you want her to complete blood work before that appointment? Claudette Rivera, Pharm. D., [address]  Medication Management Service  [phone]  1/6/2022  9:36 AM

## 2022-01-06 NOTE — PROGRESS NOTES
Thank you Kyung Chun! We'll just check her A1c with a finger stick at her appt.  Her other labs 3 months ago looked great

## 2022-01-24 ENCOUNTER — TELEPHONE (OUTPATIENT)
Dept: FAMILY MEDICINE CLINIC | Age: 67
End: 2022-01-24

## 2022-01-24 DIAGNOSIS — E11.9 TYPE 2 DIABETES MELLITUS WITHOUT COMPLICATION, UNSPECIFIED WHETHER LONG TERM INSULIN USE (HCC): Primary | ICD-10-CM

## 2022-01-24 NOTE — TELEPHONE ENCOUNTER
----- Message from Bonita Juan sent at 1/24/2022 11:09 AM EST -----  Subject: Referral Request    QUESTIONS   Reason for referral request? blood work   Has the physician seen you for this condition before? No   Preferred Specialist (if applicable)? Do you already have an appointment scheduled? No  Additional Information for Provider? pt would like to get her blood work   [facility]   ---------------------------------------------------------------------------  --------------  [address]  What is the best way for the office to contact you? OK to leave message on   voicemail  Preferred Call Back Phone Number?  [phone]

## 2022-02-10 ENCOUNTER — VIRTUAL VISIT (OUTPATIENT)
Dept: FAMILY MEDICINE CLINIC | Age: 67
End: 2022-02-10

## 2022-02-10 DIAGNOSIS — Z79.899 MEDICATION MANAGEMENT: Primary | ICD-10-CM

## 2022-02-10 PROCEDURE — APPNB60 APP NON BILLABLE TIME 46-60 MINS: Performed by: PHARMACIST

## 2022-02-10 PROCEDURE — 99999 PR OFFICE/OUTPT VISIT,PROCEDURE ONLY: CPT | Performed by: PHARMACIST

## 2022-02-10 NOTE — Clinical Note
Hi Dr Susan Samuel,    No changes made today. Appears blood glucose readings at goals. No lows. Average over last month reported by patient as being 138mg/dL. Highest reading was 207 after eating waffles and syrup. Lowest reading was 106mg/dL. Next PCP appointment scheduled for 02/28/2022. Patient planning on completing blood work on 02/21/2022. Next Meds Mgmt phone call scheduled for 03/24/2022. Roosevelt Mcclellan, Pharm. D., [address] [facility] Medication Management Service  [phone]  2/10/2022  10:00 AM

## 2022-02-10 NOTE — PROGRESS NOTES
Medication Management Service  [facility]  [phone]      Don Ambriz is a 61 y.o. female who spoke with pharmacy for management of DM under consult agreement per referral from Leslie LynnnsDO marge. Patient is scheduled for PCP appointment on 02/28/2022. Planning to complete blood work on 02/21/2022. Patient mentioned that she has been weighing her options regarding Medicare and keeping with company insurance as patient will turn 67 on 04/09/2022. Patient has been noticing that medication cost may be higher for her if she decides to switch to a Medicare Advantage type plan/Medicare Supplement. Subjective/Objective     New Problems/Changes: No new problems noted. HgbA1c last reported as 7.2% on 09/09/2021. DIET  Types of food patient reports eating includes chicken sandwich with milkshake, Hamburger with brussel sprouts, beef stir lake, pizza. Patient has made some changes to diet over the last few months. More aware of carbohydrates and servings sizes. Patient talked of how her portion sizes are smaller. May not have been eating as well. Less salad and vegetables. More bagels and lunch meat. Birthday parties with cake and ice cream.       EXERCISE  Patient enjoys swimming and has swimming pool in her back yard. Pool is now closed. Patient cuts the grass and talks of doing house work like vacuuming. 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 She also reports more walking. Has been attending craft shows. Patient's friend has given her an elliptical bike. Patient is excited to have the equipment. But has not consistently used bike because she has not had the tv hooked up in the room with the bike. Patient feels that once the tv is hooked up it will be easy to ride the the bike. Been active with day to day activities. Cleaning etc.  Did join a bowling team with niece and sister. Still has exercise bike but has not used it.  Overall patient is feeling good and her days are full of activity not necessarily as formal exercise program.  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 WEIGHT   103.6 kg-07/15/2021  102.2 kg-6/17/2021    DIABETES MANAGEMENT  Most Recent A1c:   09/21/2021-7.2%  10.5% on 03/24/2021    Home Glucose Readings  Patient has 33 days of blood glucose readings collected. Missed one day,  01/19/2022. Highest blood glucose reading was 207mg/dL after eating some waffles and syrup. Patient reports that she took that reading about 2 hours after eating. Lowest reading was 106mg/dL at 8:30am on 01/30/2022. Average for the 33 days was 138mg/dL. Nothing under a 100mg/dL. Hypoglycemia: No reports of hypoglycemia signs/symptoms by patient. Microalbumin/Creatinine Ratio:   Microalbumin Creatinine Ratio reported as  on 03/29/2021. Hyperlipidemia Medications:    Atorvastatin 80mg daily    On Statin: Yes    On Aspirin: Yes    On ACE-I or ARB for HTN or Microalbuminuria: Yes-Lisinopril 2.5mg-2 tablets daily. Medication Adherence/Cost/Adverse Events: Patient is self insured. No missed doses noted during this encounter. Patient has been adherent with prescribed medications. Patient is trying to decide if she will switch her insurance to [facility] including pharmacy benefits to a Medicare plan. Patient will turn 74years old on 04/09/2022. MEDICATIONS    New/Discontinued medications since last encounter: None noted   Diabetes Medications:     Current DM medications. Medication Sig Comments     Semaglutide, 1 MG/DOSE, (OZEMPIC, 1 MG/DOSE,) 4 MG/3ML SOPN Inject 1 mg into the skin every 7 days Taking as prescribed  Started 1mg weekly (Monday) on 10/25/2021. No GI side effects reported. Overall patient feels she is doing well with Ozempic.  insulin NPH (HUMULIN N KWIKPEN) 100 UNIT/ML injection pen INJECT SUBCUTANEOUSLY 60 UNITS IN THE MORNING. Taking as prescribed.      metFORMIN (GLUCOPHAGE-XR) 500 MG extended release tablet Take 2 tablets by mouth daily with breakfast Taking as prescribed          Assessment/Plan     Diabetes Management:   Blood glucose readings appear to be at goal.  Plan for patient to continue with Ozempic 1mg weekly, 60 units of NPH in the morning and metformin XR 500mg tablets 2 tablets daily. Telephone call in about 1 month after PCP appointment to evaluate blood glucose readings and see which type of insurance patient will go with. If patient decides to go with Medicare, it does appear that she would meet eligibility for help with Ozempic through [employer]. A [employer] patient assistance application would need to be completed if patient transitions to Medicare. Patient will continue to check blood glucose at least twice daily or for signs/symptoms of hypoglycemia. Patient will continue to work on diet management, consistent exercise/activity, and adherence with prescribed medications. Next Follow-Up: Next PCP appointment scheduled for 02/28/2022. Meds Mgmt Telephone call on [email or web address]    Progress note sent to referring provider. (Dr Booker Cao)   Patient acknowledges working in a consult agreement with the pharmacist as referred by his/her physician. Patient consent uploaded into Epic. Will need to have patient sign the most updated consent next time seen in clinic. Patient verbalized understanding of care plan. Patient advised to call Medication Management with any questions, concerns, or changes prior to next appointment. Nacho Kohler, Pharm. D., [address] [facility] Medication Management Service  [phone]  2/10/2022  9:11 AM    =================================================  For Pharmacy Admin Tracking Only     CPA in place:   Yes   Time Spent (min): 60

## 2022-02-22 LAB
ALBUMIN SERPL-MCNC: 3.9 G/DL
ALP BLD-CCNC: 49 U/L
ALT SERPL-CCNC: 26 U/L
ANION GAP SERPL CALCULATED.3IONS-SCNC: NORMAL MMOL/L
AST SERPL-CCNC: 24 U/L
AVERAGE GLUCOSE: 118
BILIRUB SERPL-MCNC: 0.5 MG/DL (ref 0.1–1.4)
BUN BLDV-MCNC: 13 MG/DL
CALCIUM SERPL-MCNC: 10.8 MG/DL
CHLORIDE BLD-SCNC: 105 MMOL/L
CO2: 29 MMOL/L
CREAT SERPL-MCNC: 0.7 MG/DL
GFR CALCULATED: 92
GLUCOSE BLD-MCNC: 118 MG/DL
HBA1C MFR BLD: 5.9 %
POTASSIUM SERPL-SCNC: 4.9 MMOL/L
SODIUM BLD-SCNC: 141 MMOL/L
TOTAL PROTEIN: 0.5

## 2022-02-24 DIAGNOSIS — E11.9 TYPE 2 DIABETES MELLITUS WITHOUT COMPLICATION, UNSPECIFIED WHETHER LONG TERM INSULIN USE (HCC): ICD-10-CM

## 2022-02-28 ENCOUNTER — OFFICE VISIT (OUTPATIENT)
Dept: FAMILY MEDICINE CLINIC | Age: 67
End: 2022-02-28
Payer: COMMERCIAL

## 2022-02-28 VITALS
BODY MASS INDEX: 34.45 KG/M2 | SYSTOLIC BLOOD PRESSURE: 136 MMHG | HEART RATE: 95 BPM | WEIGHT: 207 LBS | DIASTOLIC BLOOD PRESSURE: 81 MMHG | OXYGEN SATURATION: 97 %

## 2022-02-28 DIAGNOSIS — I25.10 ASHD (ARTERIOSCLEROTIC HEART DISEASE): ICD-10-CM

## 2022-02-28 DIAGNOSIS — E78.5 DYSLIPIDEMIA: ICD-10-CM

## 2022-02-28 DIAGNOSIS — R80.9 MICROALBUMINURIA: ICD-10-CM

## 2022-02-28 DIAGNOSIS — E11.9 TYPE 2 DIABETES MELLITUS WITHOUT COMPLICATION, UNSPECIFIED WHETHER LONG TERM INSULIN USE (HCC): Primary | ICD-10-CM

## 2022-02-28 PROCEDURE — 99214 OFFICE O/P EST MOD 30 MIN: CPT | Performed by: FAMILY MEDICINE

## 2022-02-28 RX ORDER — FENOFIBRATE 160 MG/1
160 TABLET ORAL DAILY
Qty: 90 TABLET | Refills: 1 | Status: SHIPPED | OUTPATIENT
Start: 2022-02-28

## 2022-02-28 RX ORDER — ATORVASTATIN CALCIUM 80 MG/1
80 TABLET, FILM COATED ORAL DAILY
Qty: 90 TABLET | Refills: 1 | Status: SHIPPED | OUTPATIENT
Start: 2022-02-28

## 2022-02-28 RX ORDER — LISINOPRIL 2.5 MG/1
5 TABLET ORAL DAILY
Qty: 180 TABLET | Refills: 1 | Status: SHIPPED | OUTPATIENT
Start: 2022-02-28

## 2022-02-28 RX ORDER — ASPIRIN 81 MG/1
TABLET ORAL
Qty: 90 TABLET | Refills: 1 | Status: SHIPPED | OUTPATIENT
Start: 2022-02-28

## 2022-02-28 ASSESSMENT — ENCOUNTER SYMPTOMS
BLURRED VISION: 0
ALLERGIC/IMMUNOLOGIC NEGATIVE: 1
GASTROINTESTINAL NEGATIVE: 1
EYES NEGATIVE: 1
SHORTNESS OF BREATH: 0
VISUAL CHANGE: 0
RESPIRATORY NEGATIVE: 1
CHEST TIGHTNESS: 0

## 2022-02-28 NOTE — ASSESSMENT & PLAN NOTE
Asymptomatic, continue current medications, continue current treatment plan and medication adherence emphasized

## 2022-02-28 NOTE — PATIENT INSTRUCTIONS
Patient Education        Counting Carbohydrates for Diabetes: Care Instructions  Your Care Instructions     You don't have to eat special foods when you have diabetes. You just have to be careful to eat healthy foods. Carbohydrates (carbs) raise blood sugar higher and quicker than any other nutrient. Carbs are found in desserts, breads and cereals, and fruit. They're also in starchy vegetables. These include potatoes, corn, and grains such as rice and pasta. Carbs are also in milk and yogurt. The more carbs you eat at one time, the higher your blood sugar will rise. Spreading carbs all through the day helps keep your blood sugar levels within your target range. Counting carbs is one of the best ways to keep your blood sugar under control. If you use insulin, counting carbs helps you match the right amount of insulin to the number of grams of carbs in a meal. Then you can change your diet and insulin dose as needed. Testing your blood sugar several times a day can help you learn how carbs affect your blood sugar. A registered dietitian or certified diabetes educator can help you plan meals and snacks. Follow-up care is a key part of your treatment and safety. Be sure to make and go to all appointments, and call your doctor if you are having problems. It's also a good idea to know your test results and keep a list of the medicines you take. How can you care for yourself at home? Know your daily amount of carbohydrates  Your daily amount depends on several things, such as your weight, how active you are, which diabetes medicines you take, and what your goals are for your blood sugar levels. A registered dietitian or certified diabetes educator can help you plan how many carbs to include in each meal and snack. For most adults, a guideline for the daily amount of carbs is:  · 45 to 60 grams at each meal. That's about the same as 3 to 4 carbohydrate servings. · 15 to 20 grams at each snack.  That's about the same as 1 carbohydrate serving. Count carbs  Counting carbs lets you know how much rapid-acting insulin to take before you eat. If you use an insulin pump, you get a constant rate of insulin during the day. So the pump must be programmed at meals. This gives you extra insulin to cover the rise in blood sugar after meals. If you take insulin:  · Learn your own insulin-to-carb ratio. You and your diabetes health professional will figure out the ratio. You can do this by testing your blood sugar after meals. For example, you may need a certain amount of insulin for every 15 grams of carbs. · Add up the carb grams in a meal. Then you can figure out how many units of insulin to take based on your insulin-to-carb ratio. · Exercise lowers blood sugar. You can use less insulin than you would if you were not doing exercise. Keep in mind that timing matters. If you exercise within 1 hour after a meal, your body may need less insulin for that meal than it would if you exercised 3 hours after the meal. Test your blood sugar to find out how exercise affects your need for insulin. If you do or don't take insulin:  · Look at labels on packaged foods. This can tell you how many carbs are in a serving. You can also use guides from the American Diabetes Association. · Be aware of portions, or serving sizes. If a package has two servings and you eat the whole package, you need to double the number of grams of carbohydrate listed for one serving. · Protein, fat, and fiber do not raise blood sugar as much as carbs do. If you eat a lot of these nutrients in a meal, your blood sugar will rise more slowly than it would otherwise. Eat from all food groups  · Eat at least three meals a day. · Plan meals to include food from all the food groups. The food groups include grains, fruits, dairy, proteins, and vegetables. · Talk to your dietitian or diabetes educator about ways to add limited amounts of sweets into your meal plan.   · If you drink alcohol, talk to your doctor. It may not be recommended when you are taking certain diabetes medicines. Where can you learn more? Go to https://chpepiceweb.AKT. org and sign in to your redBus.in account. Enter A506 in the [employer] box to learn more about \"Counting Carbohydrates for Diabetes: Care Instructions. \"     If you do not have an account, please click on the \"Sign Up Now\" link. Current as of: July 28, 2021               Content Version: 13.1  © 3101-2799 Healthwise, Incorporated. Care instructions adapted under license by [facility] [address] If you have questions about a medical condition or this instruction, always ask your healthcare professional. [address] any warranty or liability for your use of this information.

## 2022-02-28 NOTE — Clinical Note
Talking with Carter Arizmendi today and she is close to going on medicare and she'll do that if we can get the ozempic covered - she mentioned that you would submit an application for her and I think that's a great idea

## 2022-02-28 NOTE — PROGRESS NOTES
[facility] Progress Note    Date:2/28/2022         Patient Toshia Elliott     Date of Birth:[birth year]     Age:66 y.o. Assessment/Plan        Problem List Items Addressed This Visit        Circulatory    ASHD (arteriosclerotic heart disease)      Asymptomatic, continue current medications, continue current treatment plan and medication adherence emphasized         Relevant Medications    aspirin (ASPIRIN LOW DOSE) 81 MG EC tablet    atorvastatin (LIPITOR) 80 MG tablet    lisinopril (PRINIVIL;ZESTRIL) 2.5 MG tablet    fenofibrate (TRIGLIDE) 160 MG tablet    metoprolol tartrate (LOPRESSOR) 25 MG tablet       Endocrine    Diabetes mellitus (HCC) - Primary      Well-controlled, continue current medications, continue current treatment plan, medication adherence emphasized and lifestyle modifications recommended            Other    Dyslipidemia      Well-controlled, continue current medications, continue current treatment plan, medication adherence emphasized and lifestyle modifications recommended         Relevant Medications    atorvastatin (LIPITOR) 80 MG tablet    fenofibrate (TRIGLIDE) 160 MG tablet    Microalbuminuria      Borderline controlled, continue current medications and continue current treatment plan         Relevant Medications    lisinopril (PRINIVIL;ZESTRIL) 2.5 MG tablet           Return in about 6 months (around 8/28/2022). Electronically signed by Chela Hess DO on 2/28/22       Total time spent was between Time personally spent assessing and managing the patient on the date of service: Est: 30-39 minutes (36617) mins. This included time spent reviewing the patient's medical record (e.g., recent visits, labs, and studies); seeing the patient in the office (face-to-face time); ordering medications, studies, procedures, or referrals; calling the patient or family later in the day with results and further recommendations; and documenting the visit in the medical record.      Subjective Zackary Winters is a 61 y.o. female presenting today for   Chief Complaint   Patient presents with    Diabetes   . Urine microalbuminuria - most likely 2/2 to uncontroled diabetes. On Lisinopril    Diabetes  She presents for her follow-up diabetic visit. She has type 2 diabetes mellitus. Her disease course has been improving. There are no hypoglycemic associated symptoms. Pertinent negatives for hypoglycemia include no dizziness. There are no diabetic associated symptoms. Pertinent negatives for diabetes include no blurred vision, no chest pain, no fatigue, no foot paresthesias, no foot ulcerations, no polydipsia, no polyphagia, no polyuria, no visual change, no weakness and no weight loss. Symptoms are stable. Current diabetic treatment includes insulin injections and oral agent (monotherapy). She is compliant with treatment most of the time. Her weight is increasing steadily. Her home blood glucose trend is increasing steadily. An ACE inhibitor/angiotensin II receptor blocker is being taken. Hyperlipidemia  This is a chronic problem. The current episode started more than 1 year ago. Exacerbating diseases include diabetes, liver disease and obesity. She has no history of chronic renal disease, hypothyroidism or nephrotic syndrome. Pertinent negatives include no chest pain, focal sensory loss, focal weakness, leg pain, myalgias or shortness of breath. Current antihyperlipidemic treatment includes statins and fibric acid derivatives. The current treatment provides moderate improvement of lipids. Coronary Artery Disease  Presents for follow-up visit. Pertinent negatives include no chest pain, chest pressure, chest tightness, dizziness, leg swelling, muscle weakness, palpitations, shortness of breath or weight gain. Risk factors include hyperlipidemia and obesity. The symptoms have been stable. Compliance with diet is variable. Compliance with exercise is variable. Compliance with medications is good. Review of Systems   Review of Systems   Constitutional: Negative. Negative for fatigue, weight gain and weight loss. HENT: Negative. Eyes: Negative. Negative for blurred vision. Respiratory: Negative. Negative for chest tightness and shortness of breath. Cardiovascular: Negative. Negative for chest pain, palpitations and leg swelling. Gastrointestinal: Negative. Endocrine: Negative. Negative for polydipsia, polyphagia and polyuria. Genitourinary: Negative. Musculoskeletal: Negative. Negative for myalgias and muscle weakness. Skin: Negative. Allergic/Immunologic: Negative. Neurological: Negative. Negative for dizziness, focal weakness and weakness. Hematological: Negative. Psychiatric/Behavioral: Negative. All other systems reviewed and are negative. Medications     Current Outpatient Medications   Medication Sig Dispense Refill    aspirin (ASPIRIN LOW DOSE) 81 MG EC tablet TAKE 1 TABLET BY MOUTH ONCE DAILY 90 tablet 1    atorvastatin (LIPITOR) 80 MG tablet Take 1 tablet by mouth daily 90 tablet 1    lisinopril (PRINIVIL;ZESTRIL) 2.5 MG tablet Take 2 tablets by mouth daily 180 tablet 1    fenofibrate (TRIGLIDE) 160 MG tablet Take 1 tablet by mouth daily 90 tablet 1    metoprolol tartrate (LOPRESSOR) 25 MG tablet Take 1 tablet by mouth daily 90 tablet 1    blood glucose test strips (CONTOUR NEXT TEST) strip USE TO TEST TWICE DAILY AND AS NEEDED FOR SYMPTOMS OF  IRREGULAR BLOOD GLUCOSE 300 strip 3    insulin NPH (HUMULIN N KWIKPEN) 100 UNIT/ML injection pen INJECT SUBCUTANEOUSLY 60 UNITS IN THE MORNING. 90 mL 3    metFORMIN (GLUCOPHAGE-XR) 500 MG extended release tablet Take 2 tablets by mouth daily (with breakfast) 180 tablet 3    Semaglutide, 1 MG/DOSE, (OZEMPIC, 1 MG/DOSE,) 4 MG/3ML SOPN Inject 1 mg into the skin every 7 days 9 mL 1     No current facility-administered medications for this visit.        Past History    Past Medical History:   has a past medical history of Diabetes mellitus ([address] Dyslipidemia, and Urticaria, chronic. Social History:   reports that she quit smoking about 9 years ago. Her smoking use included cigarettes. She has a 21.00 pack-year smoking history. She has never used smokeless tobacco. She reports that she does not drink alcohol and does not use drugs. Family History:   Family History   Problem Relation Age of Onset    High Cholesterol Father     High Blood Pressure Father     Heart Disease Father     Diabetes Father     High Cholesterol Sister     High Blood Pressure Sister        Surgical History: History reviewed. No pertinent surgical history. Physical Examination      Vitals:  /81 (Site: Left Upper Arm, Position: Sitting, Cuff Size: Medium Adult)   Pulse 95   Wt 207 lb (93.9 kg)   SpO2 97%   BMI 34.45 kg/m²     Physical Exam  Vitals and nursing note reviewed. Constitutional:       General: She is not in acute distress. Appearance: Normal appearance. She is obese. She is not ill-appearing, toxic-appearing or diaphoretic. HENT:      Head: Normocephalic and atraumatic. Eyes:      General: No scleral icterus. Right eye: No discharge. Left eye: No discharge. Extraocular Movements: Extraocular movements intact. Conjunctiva/sclera: Conjunctivae normal.   Cardiovascular:      Rate and Rhythm: Normal rate and regular rhythm. Pulses: Normal pulses. Heart sounds: Normal heart sounds. No murmur heard. No friction rub. No gallop. Pulmonary:      Effort: Pulmonary effort is normal. No respiratory distress. Breath sounds: Normal breath sounds. No stridor. No wheezing, rhonchi or rales. Chest:      Chest wall: No tenderness. Neurological:      Mental Status: She is alert and oriented to person, place, and time. Mental status is at baseline. Psychiatric:         Mood and Affect: Mood normal.         Behavior: Behavior normal.         Thought Content:  Thought content normal.         Judgment: Judgment normal.         Labs/Imaging/Diagnostics   Labs:  Hemoglobin A1C   Date Value Ref Range Status   02/21/2022 5.9 % Final       Imaging Last 24 Hours:  [facility] DIGITAL SCREEN SELF REFERRAL W OR WO CAD BILATERAL  Narrative: EXAMINATION:  SCREENING DIGITAL BILATERAL  MAMMOGRAM WITH TOMOSYNTHESIS, 7/8/2020    TECHNIQUE:  Screening mammography of the bilateral breasts was performed with  tomosynthesis. 2D standard and 3D tomosynthesis combination imaging  performed through both breasts in the MLO and CC projection. Computer aided  detection was utilized in the interpretation of this exam.    COMPARISON:  02/28/2019, 12/22/2016    HISTORY:  Screening. FINDINGS:  The breast tissue is fatty replaced. There is no suspicious mass, suspicious  microcalcification, or area of architectural distortion. Impression: No mammographic evidence of malignancy. BI-RADS 1    BIRADS:  BIRADS - CATEGORY 1    Negative, no evidence of malignancy. Normal interval follow-up is  recommended in 12 months. OVERALL ASSESSMENT - NEGATIVE    A letter of notification will be sent to the patient regarding the results. The [employer] of Radiology recommends annual mammograms for women 40  years and older.

## 2022-03-04 ENCOUNTER — TELEPHONE (OUTPATIENT)
Dept: FAMILY MEDICINE CLINIC | Age: 67
End: 2022-03-04

## 2022-03-04 DIAGNOSIS — Z79.899 MEDICATION MANAGEMENT: Primary | ICD-10-CM

## 2022-03-04 NOTE — TELEPHONE ENCOUNTER
Medication Management Service ([address] [place] [address])  [facility]  [phone]     CLINICAL PHARMACY NOTE:      Spoke with patient by telephone. Pt requesting a refill of pen needles for daily insulin injection. Refill sent to [facility] per DM CPA protocol. Patient reports that she will transition to Medicare as of April 1, 2022. Patient currently on Ozempic 1mg weekly and is interested in applying for patient assistance through [employer] as she will meet eligibility for the program as of 04/01/2022. Follow up phone call was scheduled for 03/24/2022. Appointment changed to office visit on 04/04/2022 so that [employer] application can be completed. Patient will bring financial documents along with new insurance cards. Asya Saxena, Pharm. D., [address] [facility] Medication Management Service  [phone]  3/4/2022  9:36 AM    ==================================================  For Pharmacy Admin Tracking Only     CPA in place:   Yes   Recommendation Provided To: Patient/Caregiver: 3 via Telephone   Intervention Detail: Patient Access Assistance/Sample Provided, Refill(s) Provided and Scheduled Appointment   Intervention Accepted By: Patient/Caregiver: 3   Time Spent (min): 30

## 2022-03-10 NOTE — Clinical Note
Hi Dr Norma Gaona,    Met with patient yesterday. Blood glucose readings ranged from  over the last couple weeks. Average morning blood glucose was 179. Average dinner reading was 285. Patient followed up with her insurance company, both [employer] and [employer] covered on plan. Patient cost would be $120 for 90 day supply through mail order. My plan would be to have her start with a sample before having mail order send 90 day supply. I also think we should decrease her NPH dose by 10% because she does miss the evening dose a couple of times per week. Thoughts? If ok with plan, do you have a preference with Ozempic or Trulicity for her? Delfina Gowers, Pharm. D., [address]  Medication Management Service  [phone]  8/13/2021  12:44 PM Other

## 2022-03-28 RX ORDER — FLUCONAZOLE 150 MG/1
150 TABLET ORAL ONCE
Qty: 1 TABLET | Refills: 0 | Status: SHIPPED | OUTPATIENT
Start: 2022-03-28 | End: 2022-03-28

## 2022-03-28 NOTE — TELEPHONE ENCOUNTER
Patient called and stated that she has an yeast infection. PT is asking if she can get the medication Fluconazole called in as she has taken the medication in the past and it has helped.  Thank you

## 2022-04-04 ENCOUNTER — OFFICE VISIT (OUTPATIENT)
Dept: FAMILY MEDICINE CLINIC | Age: 67
End: 2022-04-04

## 2022-04-04 VITALS — BODY MASS INDEX: 34.01 KG/M2 | WEIGHT: 204.4 LBS

## 2022-04-04 DIAGNOSIS — Z79.899 MEDICATION MANAGEMENT: Primary | ICD-10-CM

## 2022-04-04 PROCEDURE — APPNB60 APP NON BILLABLE TIME 46-60 MINS: Performed by: PHARMACIST

## 2022-04-04 PROCEDURE — 99999 PR OFFICE/OUTPT VISIT,PROCEDURE ONLY: CPT | Performed by: PHARMACIST

## 2022-04-04 NOTE — PROGRESS NOTES
Medication Management Service  [facility]  [phone]      David Gay is a 61 y.o. female who spoke with pharmacy for management of DM under consult agreement per referral from Mary Haney DO. Patient has started her coverage with Medicare. Plan to complete the [employer] Patient Assistance application for [employer]. Subjective/Objective     New Problems/Changes: No new problems noted. HgbA1c last reported as 5.9% on 02/21/22. DIET  Types of food patient reports eating includes chicken sandwich with milkshake, Hamburger with brussel sprouts, beef stir lake, pizza. Patient has made some changes to diet over the last few months. More aware of carbohydrates and servings sizes. Patient talked of how her portion sizes are smaller. No changes noted during visit today. EXERCISE  Patient enjoys swimming and has swimming pool in her back yard. Pool is now closed. Patient cuts the grass and talks of doing house work like vacuuming. 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 She also reports more walking. Has been attending craft shows. Patient's friend has given her an elliptical bike. Patient is excited to have the equipment. But has not consistently used bike because she has not had the tv hooked up in the room with the bike. Patient feels that once the tv is hooked up it will be easy to ride the the bike. Been active with day to day activities. Cleaning etc.  Continues with [employer] team. 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 WEIGHT   92.7 kg on 04/04/2022  103.6 kg-07/15/2021  102.2 kg-6/17/2021    DIABETES MANAGEMENT  Most Recent A1c:   09/21/2021-7.2%  10.5% on 03/24/2021    Home Glucose Readings  125-130mg/dL. Checking daily. No lows    Hypoglycemia: No reports of hypoglycemia signs/symptoms by patient.       Microalbumin/Creatinine Ratio:   Microalbumin Creatinine Ratio reported as  on 03/29/2021. Hyperlipidemia Medications:    Atorvastatin 80mg daily    On Statin: Yes    On Aspirin: Yes    On ACE-I or ARB for HTN or Microalbuminuria: Yes-Lisinopril 2.5mg-2 tablets daily. Medication Adherence/Cost/Adverse Events: Patient currently on Medicare. Cost of Ozempic is too expensive. Plan to apply to [employer] patient assistance program.        MEDICATIONS    New/Discontinued medications since last encounter: None noted   Diabetes Medications:     Current DM medications. Medication Sig Comments     Semaglutide, 1 MG/DOSE, (OZEMPIC, 1 MG/DOSE,) 4 MG/3ML SOPN Inject 1 mg into the skin every 7 days Taking as prescribed  Started 1mg weekly (Monday) on 10/25/2021. No GI side effects reported. Overall patient feels she is doing well with Ozempic.  insulin NPH (HUMULIN N KWIKPEN) 100 UNIT/ML injection pen INJECT SUBCUTANEOUSLY 60 UNITS IN THE MORNING. Taking as prescribed.  metFORMIN (GLUCOPHAGE-XR) 500 MG extended release tablet Take 2 tablets by mouth daily with breakfast Taking as prescribed          Assessment/Plan     Diabetes Management:   Blood glucose readings appear to be at goal.  Plan for patient to continue with Ozempic 1mg weekly, 60 units of NPH in the morning and metformin XR 500mg tablets 2 tablets daily. Telephone call in about 1 month to evaluate blood glucose readings, current DM medications, and if patient has been approved for patient assistance through [employer]. Patient will continue to check blood glucose at least twice daily or for signs/symptoms of hypoglycemia. Patient will continue to work on diet management, consistent exercise/activity, and adherence with prescribed medications. Next Follow-Up: Next PCP appointment scheduled for 08/29/2022. [facility] Telephone call on [email or web address].    Progress note sent to referring provider.  (Dr Kirit Starr)   Patient acknowledges working in a consult agreement with the pharmacist as referred by his/her physician. Patient consent uploaded into Epic. Will need to have patient sign the most updated consent next time seen in clinic. Patient verbalized understanding of care plan. Patient advised to call Medication Management with any questions, concerns, or changes prior to next appointment. Patient Assistance application faxed with confirmation received. Application uploaded into Epic. Mayra Palma Pharm. D., [address] Medication Management Service  [phone]  4/4/2022  3:24 PM    =================================================  For Pharmacy Admin Tracking Only     CPA in place:   Yes   Time Spent (min): 60

## 2022-04-15 ENCOUNTER — TELEPHONE (OUTPATIENT)
Dept: FAMILY MEDICINE CLINIC | Age: 67
End: 2022-04-15

## 2022-04-15 DIAGNOSIS — B37.9 YEAST INFECTION: Primary | ICD-10-CM

## 2022-04-15 RX ORDER — FLUCONAZOLE 150 MG/1
TABLET ORAL
COMMUNITY
Start: 2022-03-28 | End: 2022-04-15 | Stop reason: SDUPTHER

## 2022-04-15 RX ORDER — FLUCONAZOLE 150 MG/1
150 TABLET ORAL ONCE
Qty: 1 TABLET | Refills: 0 | Status: SHIPPED | OUTPATIENT
Start: 2022-04-15 | End: 2022-04-15

## 2022-04-15 NOTE — TELEPHONE ENCOUNTER
Patient is still having vaginal discharge and itching. She is asking if another round of Diflucan can be prescribed to her [employer]? The last time she had yeast infection she needed 2 rounds of diflucan. Ok to leave detailed message.
non-tender

## 2022-04-21 ENCOUNTER — OFFICE VISIT (OUTPATIENT)
Dept: FAMILY MEDICINE CLINIC | Age: 67
End: 2022-04-21
Payer: MEDICARE

## 2022-04-21 ENCOUNTER — HOSPITAL ENCOUNTER (OUTPATIENT)
Age: 67
Setting detail: SPECIMEN
Discharge: HOME | End: 2022-04-21

## 2022-04-21 VITALS
HEART RATE: 78 BPM | TEMPERATURE: 97.2 F | DIASTOLIC BLOOD PRESSURE: 76 MMHG | BODY MASS INDEX: 33.78 KG/M2 | SYSTOLIC BLOOD PRESSURE: 124 MMHG | OXYGEN SATURATION: 97 % | WEIGHT: 203 LBS

## 2022-04-21 DIAGNOSIS — I25.10 ASHD (ARTERIOSCLEROTIC HEART DISEASE): ICD-10-CM

## 2022-04-21 DIAGNOSIS — E78.5 DYSLIPIDEMIA: ICD-10-CM

## 2022-04-21 DIAGNOSIS — N30.01 ACUTE CYSTITIS WITH HEMATURIA: Primary | ICD-10-CM

## 2022-04-21 DIAGNOSIS — E11.9 TYPE 2 DIABETES MELLITUS WITHOUT COMPLICATION, UNSPECIFIED WHETHER LONG TERM INSULIN USE (HCC): ICD-10-CM

## 2022-04-21 DIAGNOSIS — N30.01 ACUTE CYSTITIS WITH HEMATURIA: ICD-10-CM

## 2022-04-21 LAB
BILIRUBIN, POC: NORMAL
BLOOD URINE, POC: NORMAL
CLARITY, POC: NORMAL
COLOR, POC: NORMAL
GLUCOSE URINE, POC: NORMAL
KETONES, POC: NORMAL
LEUKOCYTE EST, POC: NORMAL
NITRITE, POC: POSITIVE
PH, POC: 7
PROTEIN, POC: 100
SPECIFIC GRAVITY, POC: 1.02
UROBILINOGEN, POC: 2

## 2022-04-21 PROCEDURE — 3044F HG A1C LEVEL LT 7.0%: CPT | Performed by: FAMILY MEDICINE

## 2022-04-21 PROCEDURE — G8400 PT W/DXA NO RESULTS DOC: HCPCS | Performed by: FAMILY MEDICINE

## 2022-04-21 PROCEDURE — 1123F ACP DISCUSS/DSCN MKR DOCD: CPT | Performed by: FAMILY MEDICINE

## 2022-04-21 PROCEDURE — 99214 OFFICE O/P EST MOD 30 MIN: CPT | Performed by: FAMILY MEDICINE

## 2022-04-21 PROCEDURE — G8417 CALC BMI ABV UP PARAM F/U: HCPCS | Performed by: FAMILY MEDICINE

## 2022-04-21 PROCEDURE — G8427 DOCREV CUR MEDS BY ELIG CLIN: HCPCS | Performed by: FAMILY MEDICINE

## 2022-04-21 PROCEDURE — 4040F PNEUMOC VAC/ADMIN/RCVD: CPT | Performed by: FAMILY MEDICINE

## 2022-04-21 PROCEDURE — 2022F DILAT RTA XM EVC RTNOPTHY: CPT | Performed by: FAMILY MEDICINE

## 2022-04-21 PROCEDURE — 81003 URINALYSIS AUTO W/O SCOPE: CPT | Performed by: FAMILY MEDICINE

## 2022-04-21 PROCEDURE — 1090F PRES/ABSN URINE INCON ASSESS: CPT | Performed by: FAMILY MEDICINE

## 2022-04-21 PROCEDURE — 3017F COLORECTAL CA SCREEN DOC REV: CPT | Performed by: FAMILY MEDICINE

## 2022-04-21 PROCEDURE — 1036F TOBACCO NON-USER: CPT | Performed by: FAMILY MEDICINE

## 2022-04-21 RX ORDER — NITROFURANTOIN 25; 75 MG/1; MG/1
100 CAPSULE ORAL 2 TIMES DAILY
Qty: 10 CAPSULE | Refills: 0 | Status: SHIPPED | OUTPATIENT
Start: 2022-04-21 | End: 2022-04-26

## 2022-04-21 SDOH — ECONOMIC STABILITY: FOOD INSECURITY: WITHIN THE PAST 12 MONTHS, YOU WORRIED THAT YOUR FOOD WOULD RUN OUT BEFORE YOU GOT MONEY TO BUY MORE.: NEVER TRUE

## 2022-04-21 SDOH — ECONOMIC STABILITY: FOOD INSECURITY: WITHIN THE PAST 12 MONTHS, THE FOOD YOU BOUGHT JUST DIDN'T LAST AND YOU DIDN'T HAVE MONEY TO GET MORE.: NEVER TRUE

## 2022-04-21 ASSESSMENT — PATIENT HEALTH QUESTIONNAIRE - PHQ9
SUM OF ALL RESPONSES TO PHQ QUESTIONS 1-9: 2
SUM OF ALL RESPONSES TO PHQ9 QUESTIONS 1 & 2: 2
SUM OF ALL RESPONSES TO PHQ QUESTIONS 1-9: 2
1. LITTLE INTEREST OR PLEASURE IN DOING THINGS: 0
SUM OF ALL RESPONSES TO PHQ QUESTIONS 1-9: 2
2. FEELING DOWN, DEPRESSED OR HOPELESS: 2
SUM OF ALL RESPONSES TO PHQ QUESTIONS 1-9: 2

## 2022-04-21 ASSESSMENT — ENCOUNTER SYMPTOMS
NAUSEA: 0
EYES NEGATIVE: 1
GASTROINTESTINAL NEGATIVE: 1
BLURRED VISION: 0
ALLERGIC/IMMUNOLOGIC NEGATIVE: 1
RESPIRATORY NEGATIVE: 1
VISUAL CHANGE: 0
CHEST TIGHTNESS: 0
VOMITING: 0
SHORTNESS OF BREATH: 0

## 2022-04-21 ASSESSMENT — SOCIAL DETERMINANTS OF HEALTH (SDOH): HOW HARD IS IT FOR YOU TO PAY FOR THE VERY BASICS LIKE FOOD, HOUSING, MEDICAL CARE, AND HEATING?: NOT HARD AT ALL

## 2022-04-21 NOTE — PATIENT INSTRUCTIONS
Patient Education        Urinary Tract Infection (UTI) in Women: Care Instructions  Overview     A urinary tract infection, or UTI, is a general term for an infection anywhere between the kidneys and the urethra (where urine comes out). Most UTIs arebladder infections. They often cause pain or burning when you urinate. UTIs are caused by bacteria and can be cured with antibiotics. Be sure tocomplete your treatment so that the infection does not get worse. Follow-up care is a key part of your treatment and safety. Be sure to make and go to all appointments, and call your doctor if you are having problems. It's also a good idea to know your test results and keep alist of the medicines you take. How can you care for yourself at home?  Take your antibiotics as directed. Do not stop taking them just because you feel better. You need to take the full course of antibiotics.  Drink extra water and other fluids for the next day or two. This will help make the urine less concentrated and help wash out the bacteria that are causing the infection. (If you have kidney, heart, or liver disease and have to limit fluids, talk with your doctor before you increase the amount of fluids you drink.)   Avoid drinks that are carbonated or have caffeine. They can irritate the bladder.  Urinate often. Try to empty your bladder each time.  To relieve pain, take a hot bath or lay a heating pad set on low over your lower belly or genital area. Never go to sleep with a heating pad in place. To prevent UTIs   Drink plenty of water each day. This helps you urinate often, which clears bacteria from your system. (If you have kidney, heart, or liver disease and have to limit fluids, talk with your doctor before you increase the amount of fluids you drink.)   Urinate when you need to.  If you are sexually active, urinate right after you have sex.  Change sanitary pads often.    Avoid douches, bubble baths, feminine hygiene sprays, and other feminine hygiene products that have deodorants.  After going to the bathroom, wipe from front to back. When should you call for help? Call your doctor now or seek immediate medical care if:     Symptoms such as fever, chills, nausea, or vomiting get worse or appear for the first time.      You have new pain in your back just below your rib cage. This is called flank pain.      There is new blood or pus in your urine.      You have any problems with your antibiotic medicine. Watch closely for changes in your health, and be sure to contact your doctor if:     You are not getting better after taking an antibiotic for 2 days.      Your symptoms go away but then come back. Where can you learn more? Go to [email or web address] and sign in to your [employer] account. Enter S345 in the [employer] box to learn more about \"Urinary Tract Infection (UTI) in Women: Care Instructions. \"     If you do not have an account, please click on the \"Sign Up Now\" link. Current as of: October 18, 2021               Content Version: 13.2  © 5474-0871 [employer], [employer]orporated. Care instructions adapted under license by [facility] ([facility]). If you have questions about a medical condition or this instruction, always ask your healthcare professional. Kelvin Guzman any warranty or liability for your use of this information.

## 2022-04-21 NOTE — PROGRESS NOTES
[facility] Progress Note    Date:4/21/2022         Patient Petr Hart     YOB: 1955     Age:67 y.o. Assessment/Plan        Problem List Items Addressed This Visit        Circulatory    ASHD (arteriosclerotic heart disease)      Asymptomatic, continue current medications and continue current treatment plan            Endocrine    Diabetes mellitus ([facility] [address]      Well-controlled, continue current medications, continue current treatment plan, medication adherence emphasized and lifestyle modifications recommended            Other    Dyslipidemia      Well-controlled, continue current medications and continue current treatment plan           Other Visit Diagnoses     Acute cystitis with hematuria    -  Primary    Reviewied POCT dip  Macrobid    Relevant Medications    nitrofurantoin, macrocrystal-monohydrate, (MACROBID) 100 MG capsule    Other Relevant Orders    POCT Urinalysis No Micro (Auto) (Completed)    Culture, Urine           Return if symptoms worsen or fail to improve. Electronically signed by Meri Llamas DO on 4/21/22       Total time spent was between Time personally spent assessing and managing the patient on the date of service: Est: 30-39 minutes (28817) mins. This included time spent reviewing the patient's medical record (e.g., recent visits, labs, and studies); seeing the patient in the office (face-to-face time); ordering medications, studies, procedures, or referrals; calling the patient or family later in the day with results and further recommendations; and documenting the visit in the medical record. Subjective     Mike Mckeon is a 74 y.o. female presenting today for   Chief Complaint   Patient presents with    Urinary Frequency    Dysuria     swelling, blood not sure if its in urine or vaginal   .    Just got over a yeast infection needing 2 rounds of diflucan    Urinary Tract Infection   This is a new problem. The current episode started in the past 7 days. The problem has been gradually worsening. The quality of the pain is described as burning. The pain is mild. There has been no fever. Associated symptoms include a discharge, frequency and hematuria. Pertinent negatives include no chills, flank pain, hesitancy, nausea, sweats, urgency or vomiting. Diabetes  She presents for her follow-up diabetic visit. She has type 2 diabetes mellitus. Her disease course has been improving. There are no hypoglycemic associated symptoms. Pertinent negatives for hypoglycemia include no dizziness or sweats. There are no diabetic associated symptoms. Pertinent negatives for diabetes include no blurred vision, no chest pain, no fatigue, no foot paresthesias, no foot ulcerations, no polydipsia, no polyphagia, no polyuria, no visual change, no weakness and no weight loss. Symptoms are stable. Current diabetic treatment includes insulin injections and oral agent (monotherapy). She is compliant with treatment most of the time. Her weight is increasing steadily. Her home blood glucose trend is increasing steadily. An ACE inhibitor/angiotensin II receptor blocker is being taken. Hyperlipidemia  This is a chronic problem. The current episode started more than 1 year ago. Exacerbating diseases include diabetes, liver disease and obesity. She has no history of chronic renal disease, hypothyroidism or nephrotic syndrome. Pertinent negatives include no chest pain, focal sensory loss, focal weakness, leg pain, myalgias or shortness of breath. Current antihyperlipidemic treatment includes statins and fibric acid derivatives. The current treatment provides moderate improvement of lipids. Coronary Artery Disease  Presents for follow-up visit. Pertinent negatives include no chest pain, chest pressure, chest tightness, dizziness, leg swelling, muscle weakness, palpitations, shortness of breath or weight gain. Risk factors include hyperlipidemia and obesity. The symptoms have been stable. Compliance with diet is variable. Compliance with exercise is variable. Compliance with medications is good. Review of Systems   Review of Systems   Constitutional: Negative. Negative for chills, fatigue, weight gain and weight loss. HENT: Negative. Eyes: Negative. Negative for blurred vision. Respiratory: Negative. Negative for chest tightness and shortness of breath. Cardiovascular: Negative. Negative for chest pain, palpitations and leg swelling. Gastrointestinal: Negative. Negative for nausea and vomiting. Endocrine: Negative. Negative for polydipsia, polyphagia and polyuria. Genitourinary: Positive for frequency and hematuria. Negative for flank pain, hesitancy and urgency. Musculoskeletal: Negative. Negative for myalgias and muscle weakness. Skin: Negative. Allergic/Immunologic: Negative. Neurological: Negative. Negative for dizziness, focal weakness and weakness. Hematological: Negative. Psychiatric/Behavioral: Negative. All other systems reviewed and are negative.       Medications     Current Outpatient Medications   Medication Sig Dispense Refill    nitrofurantoin, macrocrystal-monohydrate, (MACROBID) 100 MG capsule Take 1 capsule by mouth 2 times daily for 5 days 10 capsule 0    Insulin Pen Needle 31G X 5 MM MISC Use 1 pen needle for daily insulin injection 100 each 3    aspirin (ASPIRIN LOW DOSE) 81 MG EC tablet TAKE 1 TABLET BY MOUTH ONCE DAILY 90 tablet 1    atorvastatin (LIPITOR) 80 MG tablet Take 1 tablet by mouth daily 90 tablet 1    lisinopril (PRINIVIL;ZESTRIL) 2.5 MG tablet Take 2 tablets by mouth daily 180 tablet 1    fenofibrate (TRIGLIDE) 160 MG tablet Take 1 tablet by mouth daily 90 tablet 1    metoprolol tartrate (LOPRESSOR) 25 MG tablet Take 1 tablet by mouth daily 90 tablet 1    blood glucose test strips (CONTOUR NEXT TEST) strip USE TO TEST TWICE DAILY AND AS NEEDED FOR SYMPTOMS OF  IRREGULAR BLOOD GLUCOSE 300 strip 3    insulin NPH (HUMULIN N KWIKPEN) 100 UNIT/ML injection pen INJECT SUBCUTANEOUSLY 60 UNITS IN THE MORNING. 90 mL 3    metFORMIN (GLUCOPHAGE-XR) 500 MG extended release tablet Take 2 tablets by mouth daily (with breakfast) 180 tablet 3    Semaglutide, 1 MG/DOSE, (OZEMPIC, 1 MG/DOSE,) 4 MG/3ML SOPN Inject 1 mg into the skin every 7 days 9 mL 1     No current facility-administered medications for this visit. Past History    Past Medical History:   has a past medical history of Diabetes mellitus ([address] Dyslipidemia, and Urticaria, chronic. Social History:   reports that she quit smoking about 9 years ago. Her smoking use included cigarettes. She has a 21.00 pack-year smoking history. She has never used smokeless tobacco. She reports that she does not drink alcohol and does not use drugs. Family History:   Family History   Problem Relation Age of Onset    High Cholesterol Father     High Blood Pressure Father     Heart Disease Father     Diabetes Father     High Cholesterol Sister     High Blood Pressure Sister        Surgical History: History reviewed. No pertinent surgical history. Physical Examination      Vitals:  /76   Pulse 78   Temp 97.2 °F (36.2 °C) (Temporal)   Wt 203 lb (92.1 kg)   SpO2 97%   BMI 33.78 kg/m²     Physical Exam  Vitals and nursing note reviewed. Constitutional:       General: She is not in acute distress. Appearance: Normal appearance. She is obese. She is not ill-appearing, toxic-appearing or diaphoretic. HENT:      Head: Normocephalic and atraumatic. Eyes:      General: No scleral icterus. Right eye: No discharge. Left eye: No discharge. Extraocular Movements: Extraocular movements intact. Conjunctiva/sclera: Conjunctivae normal.   Cardiovascular:      Rate and Rhythm: Normal rate and regular rhythm. Pulses: Normal pulses. Heart sounds: Normal heart sounds. No murmur heard. No friction rub. No gallop.     Pulmonary:      Effort: Pulmonary effort is normal. No respiratory distress. Breath sounds: Normal breath sounds. No stridor. No wheezing, rhonchi or rales. Chest:      Chest wall: No tenderness. Musculoskeletal:         General: No tenderness (flank). Neurological:      Mental Status: She is alert and oriented to person, place, and time. Mental status is at baseline. Psychiatric:         Mood and Affect: Mood normal.         Behavior: Behavior normal.         Thought Content: Thought content normal.         Judgment: Judgment normal.         Labs/Imaging/Diagnostics   Labs:  Hemoglobin A1C   Date Value Ref Range Status   02/21/2022 5.9 % Final       Imaging Last 24 Hours:  [facility] DIGITAL SCREEN SELF REFERRAL W OR WO CAD BILATERAL  Narrative: EXAMINATION:  SCREENING DIGITAL BILATERAL  MAMMOGRAM WITH TOMOSYNTHESIS, 7/8/2020    TECHNIQUE:  Screening mammography of the bilateral breasts was performed with  tomosynthesis. 2D standard and 3D tomosynthesis combination imaging  performed through both breasts in the MLO and CC projection. Computer aided  detection was utilized in the interpretation of this exam.    COMPARISON:  02/28/2019, 12/22/2016    HISTORY:  Screening. FINDINGS:  The breast tissue is fatty replaced. There is no suspicious mass, suspicious  microcalcification, or area of architectural distortion. Impression: No mammographic evidence of malignancy. BI-RADS 1    BIRADS:  BIRADS - CATEGORY 1    Negative, no evidence of malignancy. Normal interval follow-up is  recommended in 12 months. OVERALL ASSESSMENT - NEGATIVE    A letter of notification will be sent to the patient regarding the results. The [employer] recommends annual mammograms for women 40  years and older.

## 2022-04-22 LAB
CULTURE: ABNORMAL
SPECIMEN DESCRIPTION: ABNORMAL

## 2022-05-09 ENCOUNTER — PHARMACY VISIT (OUTPATIENT)
Dept: FAMILY MEDICINE CLINIC | Age: 67
End: 2022-05-09

## 2022-05-09 DIAGNOSIS — Z79.899 MEDICATION MANAGEMENT: Primary | ICD-10-CM

## 2022-05-09 PROCEDURE — 99999 PR OFFICE/OUTPT VISIT,PROCEDURE ONLY: CPT | Performed by: PHARMACIST

## 2022-05-09 PROCEDURE — APPNB30 APP NON BILLABLE TIME 0-30 MINS: Performed by: PHARMACIST

## 2022-05-09 NOTE — Clinical Note
Hi Dr Magno Romero with patient today. Has been approved for Panchito Reinoos through [employer]. No changes to DM medications. Will follow up with patient by telephone in about 2 months. Next PCP appointment is scheduled for 08/29/2022. Lon Peñaloza, Pharm. D., [address] [facility] Medication Management Service  [phone]  5/9/2022  10:17 AM

## 2022-05-09 NOTE — PROGRESS NOTES
Medication Management Service  [facility]  [phone]      Erica Jaime is a 74 y.o. female who spoke with pharmacy for management of DM under consult agreement per referral from Sruthi Schmid DO. Patient has started her coverage with Medicare. [address] [place] [address] Patient Assistance application for Panchito Reinoso completed and patient has received first shipment of medication. Subjective/Objective     New Problems/Changes: No new problems noted. HgbA1c last reported as 5.9% on 02/21/22. DIET  Types of food patient reports eating includes chicken sandwich with milkshake, Hamburger with brussel sprouts, beef stir lake, pizza. Patient has made some changes to diet over the last few months. More aware of carbohydrates and servings sizes. Patient talked of how her portion sizes are smaller. No changes noted during visit today. Due to crazy work schedule, patient may not be choosing the best foods at times. May have a whooper jr on the way home from and then go to bed. EXERCISE  Patient enjoys swimming and has swimming pool in her back yard. Pool is now closed. Patient cuts the grass and talks of doing house work like vacuuming. 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 She also reports more walking. Has been attending craft shows. Patient's friend has given her an elliptical bike. Patient is excited to have the equipment. But has not consistently used bike because she has not had the tv hooked up in the room with the bike. Patient feels that once the tv is hooked up it will be easy to ride the the bike. Been active with day to day activities. Cleaning, yard work (yesterday) etc.  Bowling team completed but signed up again for next year. 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 13-15 hour days.       WEIGHT   92.7 kg on 04/04/2022  103.6 kg-07/15/2021  102.2 kg-6/17/2021    DIABETES MANAGEMENT  Most Recent A1c:   02/21/2022_5.9%  09/21/2021-7.2%  10.5% on 03/24/2021    Home Glucose Readings   mg/dL. Checking daily. No lows    Hypoglycemia: No reports of hypoglycemia signs/symptoms by patient. Microalbumin/Creatinine Ratio:   Microalbumin Creatinine Ratio reported as  on 03/29/2021. Hyperlipidemia Medications:    Atorvastatin 80mg daily    On Statin: Yes    On Aspirin: Yes    On ACE-I or ARB for HTN or Microalbuminuria: Yes-Lisinopril 2.5mg-2 tablets daily. Medication Adherence/Cost/Adverse Events: Approved for Ozempic through [employer] through 12/31/2022. Will need to reapply for 2023. MEDICATIONS    New/Discontinued medications since last encounter: None noted   Diabetes Medications:     Current DM medications. Medication Sig Comments     Semaglutide, 1 MG/DOSE, (OZEMPIC, 1 MG/DOSE,) 4 MG/3ML SOPN Inject 1 mg into the skin every 7 days Taking as prescribed  Started 1mg weekly (Monday) on 10/25/2021. No GI side effects reported. Overall patient feels she is doing well with Ozempic.  insulin NPH (HUMULIN N KWIKPEN) 100 UNIT/ML injection pen INJECT SUBCUTANEOUSLY 60 UNITS IN THE MORNING. Taking as prescribed.  metFORMIN (GLUCOPHAGE-XR) 500 MG extended release tablet Take 2 tablets by mouth daily with breakfast Taking as prescribed          Assessment/Plan     Diabetes Management:   Blood glucose readings at goal.  Plan for patient to continue with Ozempic 1mg weekly, 60 units of NPH in the morning and metformin XR 500mg tablets 2 tablets daily. Telephone call in about 2 month to evaluate blood glucose readings, current DM medications, and if patient will need a refill for Ozempic through [employer] (Patient Assistance Program). .     Patient will continue to check blood glucose at least twice daily or for signs/symptoms of hypoglycemia. Patient will continue to work on diet management, consistent exercise/activity, and adherence with prescribed medications.     Next Follow-Up: Next PCP appointment scheduled for 08/29/2022. Meds Mgmt Telephone call on [email or web address].    Progress note sent to referring provider. (Dr Shanice Dhaliwal)   Patient acknowledges working in a consult agreement with the pharmacist as referred by his/her physician. Patient consent uploaded into Epic. Will need to have patient sign the most updated consent next time seen in clinic. Patient verbalized understanding of care plan. Patient advised to call Medication Management with any questions, concerns, or changes prior to next appointment. Patient Assistance application faxed with confirmation received. Application uploaded into Epic. Irma Vinson, Pharm. D., [address] Medication Management Service  [phone]  5/9/2022  8:20 AM    =================================================  For Pharmacy Admin Tracking Only     CPA in place:   Yes   Time Spent (min): 60

## 2022-07-11 ENCOUNTER — PHARMACY VISIT (OUTPATIENT)
Dept: FAMILY MEDICINE CLINIC | Age: 67
End: 2022-07-11

## 2022-07-11 DIAGNOSIS — E11.9 TYPE 2 DIABETES MELLITUS WITHOUT COMPLICATION, UNSPECIFIED WHETHER LONG TERM INSULIN USE (HCC): Primary | ICD-10-CM

## 2022-07-11 PROCEDURE — 99999 PR OFFICE/OUTPT VISIT,PROCEDURE ONLY: CPT | Performed by: PHARMACIST

## 2022-07-11 PROCEDURE — APPNB60 APP NON BILLABLE TIME 46-60 MINS: Performed by: PHARMACIST

## 2022-07-11 NOTE — Clinical Note
Met with patient today telephonically to review blood glucose numbers and DM medication adherence. Patient is stable on current therapy and no medication changes were made. Patient has an appointment with you on 8/29/2022.   Patient has an appointment with Medication Management Service on 10/17/2022 to review blood glucose readings, medication adherence, and to initiate application for [address] [place] [address] Patient Assistance Program.    Claritza Ferreira, PharmD, [address]  [address] Medication Management Service  [phone]  7/11/2022  3:04 PM

## 2022-07-11 NOTE — PROGRESS NOTES
Taking as prescribed.  Semaglutide, 1 MG/DOSE, (OZEMPIC, 1 MG/DOSE,) 4 MG/3ML SOPN Inject 1 mg into the skin every 7 days Takeing on Mondays. Denies any GI side effects. Pt reported minimal weight loss (approximately 1 pound difference from last weight measured in office). Pt reported that she currently has two more months of this medication at home. Home Glucose Readings:    Patient reported a fasting blood glucose reading of 121 mg/dL this morning. Stated that the highest recent blood glucose reading she has had was 142 mg/dL and the lowest was 109 mg/dL. Hypoglycemia:     Patient reported a \"couple\" of instances of hypoglycemia where she gets lightheaded. Patient admits that this occurs when she has not eaten anything for breakfast. She described that when these symptoms occur she will eat a snack first thing when she is at work. Patient stated that she does not measure her blood glucose when this happens. Renal monitoring:    Lab Results   Component Value Date/Time    MICROALBUR 10 03/29/2021 04:29 PM     CrCl cannot be calculated (Unknown ideal weight.). BLOOD PRESSURE MANAGEMENT    BP Readings from Last 3 Encounters:   04/21/22 124/76   02/28/22 136/81   08/12/21 126/82     CHOLESTEROL MANAGEMENT    Lab Results   Component Value Date    CHOL 111 09/09/2021    TRIG 101 09/09/2021    HDL 38 09/09/2021    LDLCALC 55 09/09/2021     ALT   Date Value Ref Range Status   02/21/2022 26 U/L Final     AST   Date Value Ref Range Status   02/21/2022 24 U/L Final     The ASCVD Risk score (Marsha Flowers, et al., 2013) failed to calculate for the following reasons:     The valid total cholesterol range is 130 to 320 mg/dL     MEDICATIONS    New/Discontinued medications since last encounter:    None noted    Current Outpatient Medications   Medication Sig Dispense Refill    Insulin Pen Needle 31G X 5 MM MISC Use 1 pen needle for daily insulin injection 100 each 3    aspirin (ASPIRIN LOW DOSE) 81 MG EC tablet TAKE 1 TABLET BY MOUTH ONCE DAILY 90 tablet 1    atorvastatin (LIPITOR) 80 MG tablet Take 1 tablet by mouth daily 90 tablet 1    lisinopril (PRINIVIL;ZESTRIL) 2.5 MG tablet Take 2 tablets by mouth daily 180 tablet 1    fenofibrate (TRIGLIDE) 160 MG tablet Take 1 tablet by mouth daily 90 tablet 1    metoprolol tartrate (LOPRESSOR) 25 MG tablet Take 1 tablet by mouth daily 90 tablet 1    blood glucose test strips (CONTOUR NEXT TEST) strip USE TO TEST TWICE DAILY AND AS NEEDED FOR SYMPTOMS OF  IRREGULAR BLOOD GLUCOSE 300 strip 3    insulin NPH (HUMULIN N KWIKPEN) 100 UNIT/ML injection pen INJECT SUBCUTANEOUSLY 60 UNITS IN THE MORNING. 90 mL 3    metFORMIN (GLUCOPHAGE-XR) 500 MG extended release tablet Take 2 tablets by mouth daily (with breakfast) 180 tablet 3    Semaglutide, 1 MG/DOSE, (OZEMPIC, 1 MG/DOSE,) 4 MG/3ML SOPN Inject 1 mg into the skin every 7 days 9 mL 1     No current facility-administered medications for this visit. On Statin: Yes    On ACE-I or ARB for HTN or Microalbuminuria: Yes    Medication Adherence/Cost/Adverse Events:   Approved for Ozempic through [employer] through 12/31/2022. Will need to reapply for 2023. Next phone appointment with Medication Management Service scheduled for Monday October 17, 2022 at 10 am for which the application process will be discussed/initiated for 2023. Assessment/Plan     Diabetes Management:     Fasting blood glucose readings provided by patient were at goal.  Patient reported experiencing hypoglycemia a \"couple\" of times although she admits this happens when she does not eat breakfast.  Patient described how to manage hypoglycemia when it occurs. Plan for patient to continue with Ozempic 1 mg weekly, 60 units of NPH every morning, and metformin  mg tablets 2 tablets daily with breakfast.     Patient reported having \"plenty\" of medications to last until her next PCP appointment.   Patient stated that she will wait to hear from PCP at that appointment on which labs she is due for. Telephone call in about 3 month to evaluate blood glucose readings, current DM medications, and for the application process for receiving Ozempic through [employer] Patient Assistance Program for 2023 will be discussed/initiated.     Patient will continue to check blood glucose at least twice daily or for signs/symptoms of hypoglycemia. Patient will continue to work on diet management, consistent exercise/activity, and adherence with prescribed medications. The following education was provided during today's visit:     [x] Medication adherence   [x] Healthy lifestyle including diet and exercise changes   [x] Hypoglycemia symptoms and management   [x] Blood glucose goals     Next Follow-Up:    Next PCP appointment scheduled for 08/29/2022.     Meds Mgmt Telephone call on [email or web address]     Progress note sent to referring provider. (Dr Alyssa Gomes)     Patient verbalized understanding of care plan. Patient advised to call Medication Management with any questions, concerns, or changes prior to next appointment. Patient acknowledges working in a consult agreement with the pharmacist as referred by his/her physician. Dina Del Angel, PharmD, [facility] Medication Management Service  [phone]  7/11/2022  2:51 PM    ==================================================================    For Pharmacy Admin Tracking Only     CPA in place:   Yes   Recommendation Provided To: Patient/Caregiver: 1 via Telephone   Intervention Detail: Scheduled Appointment   Intervention Accepted By: Patient/Caregiver: 1   Time Spent (min): 60

## 2022-07-14 ENCOUNTER — TELEPHONE (OUTPATIENT)
Dept: FAMILY MEDICINE CLINIC | Age: 67
End: 2022-07-14

## 2022-07-31 ENCOUNTER — HOSPITAL ENCOUNTER (EMERGENCY)
Age: 67
Discharge: HOME | End: 2022-07-31
Attending: EMERGENCY MEDICINE
Payer: OTHER MISCELLANEOUS

## 2022-07-31 ENCOUNTER — APPOINTMENT (OUTPATIENT)
Dept: GENERAL RADIOLOGY | Age: 67
End: 2022-07-31
Payer: OTHER MISCELLANEOUS

## 2022-07-31 VITALS
RESPIRATION RATE: 16 BRPM | HEIGHT: 68 IN | WEIGHT: 207 LBS | TEMPERATURE: 98.1 F | OXYGEN SATURATION: 95 % | HEART RATE: 90 BPM | BODY MASS INDEX: 31.37 KG/M2 | SYSTOLIC BLOOD PRESSURE: 123 MMHG | DIASTOLIC BLOOD PRESSURE: 63 MMHG
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
## 2022-07-31 DIAGNOSIS — R07.89 LEFT-SIDED CHEST WALL PAIN: ICD-10-CM

## 2022-07-31 PROCEDURE — 99283 EMERGENCY DEPT VISIT LOW MDM: CPT

## 2022-07-31 PROCEDURE — 71101 X-RAY EXAM UNILAT RIBS/CHEST: CPT

## 2022-07-31 PROCEDURE — 6370000000 HC RX 637 (ALT 250 FOR IP): Performed by: NURSE PRACTITIONER

## 2022-07-31 RX ORDER — OXYCODONE HYDROCHLORIDE AND ACETAMINOPHEN 5; 325 MG/1; MG/1
2 TABLET ORAL ONCE
Status: COMPLETED | OUTPATIENT
Start: 2022-07-31 | End: 2022-07-31

## 2022-07-31 RX ORDER — HYDROCODONE BITARTRATE AND ACETAMINOPHEN 5; 325 MG/1; MG/1
1 TABLET ORAL EVERY 8 HOURS PRN
Qty: 12 TABLET | Refills: 0 | Status: SHIPPED | OUTPATIENT
Start: 2022-07-31 | End: 2022-08-04

## 2022-07-31 RX ADMIN — OXYCODONE AND ACETAMINOPHEN 2 TABLET: 5; 325 TABLET ORAL at 19:04

## 2022-07-31 ASSESSMENT — ENCOUNTER SYMPTOMS
EYE PAIN: 0
VOMITING: 0
TROUBLE SWALLOWING: 0
COUGH: 0
FACIAL SWELLING: 0
PHOTOPHOBIA: 0
SHORTNESS OF BREATH: 0
COLOR CHANGE: 0
NAUSEA: 0
BACK PAIN: 0
ABDOMINAL PAIN: 0

## 2022-07-31 ASSESSMENT — PAIN DESCRIPTION - FREQUENCY: FREQUENCY: INTERMITTENT

## 2022-07-31 ASSESSMENT — PAIN DESCRIPTION - ORIENTATION: ORIENTATION: LEFT

## 2022-07-31 ASSESSMENT — PAIN DESCRIPTION - LOCATION: LOCATION: ABDOMEN;RIB CAGE

## 2022-07-31 ASSESSMENT — PAIN SCALES - GENERAL: PAINLEVEL_OUTOF10: 8

## 2022-07-31 ASSESSMENT — PAIN DESCRIPTION - DESCRIPTORS: DESCRIPTORS: SHARP;THROBBING

## 2022-07-31 ASSESSMENT — PAIN - FUNCTIONAL ASSESSMENT: PAIN_FUNCTIONAL_ASSESSMENT: 0-10

## 2022-07-31 NOTE — LETTER
[facility] ED  [address] [id number]  Phone: [phone]             July 31, 2022    Patient: Kimberly Belle   YOB: 1955   Date of Visit: 7/31/2022       To Whom It May Concern:    Mode Del Toro was seen and treated in our emergency department on 7/31/2022. Please excuse her from work 7/31/22 through 8/2/22.     Sincerely,             Signature:__________________________________
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
## 2022-08-01 ENCOUNTER — TELEPHONE (OUTPATIENT)
Dept: FAMILY MEDICINE CLINIC | Age: 67
End: 2022-08-01

## 2022-08-01 NOTE — TELEPHONE ENCOUNTER
[facility] Transitions Initial Follow Up Call    Outreach made within 2 business days of discharge: Yes    Patient: Nan Torres Patient : 1955   MRN: [id number]  Reason for Admission: There are no discharge diagnoses documented for the most recent discharge.   Discharge Date: 22       Spoke with: Arpit MEADOWS    Discharge department/facility: [facility]  Scheduled appointment with PCP within 7-14 days    Follow Up  Future Appointments   Date Time Provider Urvashi Galarza   2022  3:20 PM Zohra MORALES [facility]   10/17/2022  9:00 AM ERIC Pablo [facility] [place] [facility]       Debra Boyd, Whitley Young

## 2022-08-29 ENCOUNTER — OFFICE VISIT (OUTPATIENT)
Dept: FAMILY MEDICINE CLINIC | Age: 67
End: 2022-08-29
Payer: MEDICARE

## 2022-08-29 VITALS
WEIGHT: 197 LBS | BODY MASS INDEX: 29.95 KG/M2 | OXYGEN SATURATION: 97 % | DIASTOLIC BLOOD PRESSURE: 82 MMHG | HEART RATE: 72 BPM | TEMPERATURE: 97.3 F | SYSTOLIC BLOOD PRESSURE: 132 MMHG

## 2022-08-29 DIAGNOSIS — E11.9 TYPE 2 DIABETES MELLITUS WITHOUT COMPLICATION, UNSPECIFIED WHETHER LONG TERM INSULIN USE (HCC): Primary | ICD-10-CM

## 2022-08-29 DIAGNOSIS — R80.9 MICROALBUMINURIA: ICD-10-CM

## 2022-08-29 DIAGNOSIS — I25.10 ASHD (ARTERIOSCLEROTIC HEART DISEASE): ICD-10-CM

## 2022-08-29 DIAGNOSIS — E78.5 DYSLIPIDEMIA: ICD-10-CM

## 2022-08-29 LAB — HBA1C MFR BLD: 6.3 %

## 2022-08-29 PROCEDURE — 3044F HG A1C LEVEL LT 7.0%: CPT | Performed by: FAMILY MEDICINE

## 2022-08-29 PROCEDURE — 99214 OFFICE O/P EST MOD 30 MIN: CPT | Performed by: FAMILY MEDICINE

## 2022-08-29 PROCEDURE — 3288F FALL RISK ASSESSMENT DOCD: CPT | Performed by: FAMILY MEDICINE

## 2022-08-29 PROCEDURE — 83036 HEMOGLOBIN GLYCOSYLATED A1C: CPT | Performed by: FAMILY MEDICINE

## 2022-08-29 PROCEDURE — 1123F ACP DISCUSS/DSCN MKR DOCD: CPT | Performed by: FAMILY MEDICINE

## 2022-08-29 ASSESSMENT — ENCOUNTER SYMPTOMS
VISUAL CHANGE: 0
BLURRED VISION: 0
CHEST TIGHTNESS: 0
ALLERGIC/IMMUNOLOGIC NEGATIVE: 1
RESPIRATORY NEGATIVE: 1
EYES NEGATIVE: 1
GASTROINTESTINAL NEGATIVE: 1
SHORTNESS OF BREATH: 0

## 2022-08-29 ASSESSMENT — PATIENT HEALTH QUESTIONNAIRE - PHQ9
SUM OF ALL RESPONSES TO PHQ QUESTIONS 1-9: 0
2. FEELING DOWN, DEPRESSED OR HOPELESS: 0
SUM OF ALL RESPONSES TO PHQ QUESTIONS 1-9: 0
SUM OF ALL RESPONSES TO PHQ9 QUESTIONS 1 & 2: 0
1. LITTLE INTEREST OR PLEASURE IN DOING THINGS: 0

## 2022-08-29 NOTE — Clinical Note
Fabrice Thompsonte saw [place] today. She has a f/u with you on 10/17. Her A1c went from 5.9% to 6.3% and she's lost another 10lbs and feeling well. I talked to her about possibly increasing Ozempic to 2mg weekly and told her I'd talk to you about it and you all could follow up about it at your next appt (unless you wanted to make the change sooner).   Thank you,  Dr. Jorge Martinez

## 2022-08-29 NOTE — PROGRESS NOTES
[facility] Progress Note    Date:8/29/2022         Patient Name:Verena Elliott     Date of Birth:[birth year]     Age:67 y.o. Assessment/Plan        Problem List Items Addressed This Visit          Circulatory    ASHD (arteriosclerotic heart disease)      Asymptomatic, continue current medications and continue current treatment plan            Endocrine    Diabetes mellitus ([facility] [address] - Primary      Well-controlled, continue current medications, continue current treatment plan, medication adherence emphasized and lifestyle modifications recommended   Discussed possibly increasing Ozempic if needed - discuss with PC pharmacist         Relevant Orders    POCT glycosylated hemoglobin (Hb A1C) (Completed)       Other    Dyslipidemia      Well-controlled, continue current medications, continue current treatment plan, medication adherence emphasized and lifestyle modifications recommended         Microalbuminuria      Well-controlled, continue current medications and continue current treatment plan             Return in about 6 months (around 2/28/2023). Electronically signed by Nimisha Ybarra DO on 8/29/22       Total time spent was between Time personally spent assessing and managing the patient on the date of service: Est: 30-39 minutes ([id number]) mins. This included time spent reviewing the patient's medical record (e.g., recent visits, labs, and studies); seeing the patient in the office (face-to-face time); ordering medications, studies, procedures, or referrals; calling the patient or family later in the day with results and further recommendations; and documenting the visit in the medical record. Nafisa Reddy is a 74 y.o. female presenting today for   Chief Complaint   Patient presents with    Diabetes    Hyperlipidemia   . Urine microalbuminuria - most likely 2/2 to uncontroled diabetes. On Lisinopril    Diabetes  She presents for her follow-up diabetic visit.  She has type 2 diabetes mellitus. Her disease course has been improving. There are no hypoglycemic associated symptoms. Pertinent negatives for hypoglycemia include no dizziness. There are no diabetic associated symptoms. Pertinent negatives for diabetes include no blurred vision, no chest pain, no fatigue, no foot paresthesias, no foot ulcerations, no polydipsia, no polyphagia, no polyuria, no visual change, no weakness and no weight loss. Symptoms are stable. Current diabetic treatment includes insulin injections and oral agent (monotherapy). She is compliant with treatment most of the time. Her weight is increasing steadily. Her home blood glucose trend is increasing steadily. An ACE inhibitor/angiotensin II receptor blocker is being taken. Hyperlipidemia  This is a chronic problem. The current episode started more than 1 year ago. Exacerbating diseases include diabetes, liver disease and obesity. She has no history of chronic renal disease, hypothyroidism or nephrotic syndrome. Pertinent negatives include no chest pain, focal sensory loss, focal weakness, leg pain, myalgias or shortness of breath. Current antihyperlipidemic treatment includes statins and fibric acid derivatives. The current treatment provides moderate improvement of lipids. Coronary Artery Disease  Presents for follow-up visit. Pertinent negatives include no chest pain, chest pressure, chest tightness, dizziness, leg swelling, muscle weakness, palpitations, shortness of breath or weight gain. Risk factors include hyperlipidemia and obesity. The symptoms have been stable. Compliance with diet is variable. Compliance with exercise is variable. Compliance with medications is good. Review of Systems   Review of Systems   Constitutional: Negative. Negative for fatigue, weight gain and weight loss. HENT: Negative. Eyes: Negative. Negative for blurred vision. Respiratory: Negative. Negative for chest tightness and shortness of breath.     Cardiovascular: Negative. Negative for chest pain, palpitations and leg swelling. Gastrointestinal: Negative. Endocrine: Negative. Negative for polydipsia, polyphagia and polyuria. Genitourinary: Negative. Musculoskeletal: Negative. Negative for myalgias and muscle weakness. Skin: Negative. Allergic/Immunologic: Negative. Neurological: Negative. Negative for dizziness, focal weakness and weakness. Hematological: Negative. Psychiatric/Behavioral: Negative. All other systems reviewed and are negative. Medications     Current Outpatient Medications   Medication Sig Dispense Refill    Insulin Pen Needle 31G X 5 MM MISC Use 1 pen needle for daily insulin injection 100 each 3    aspirin (ASPIRIN LOW DOSE) 81 MG EC tablet TAKE 1 TABLET BY MOUTH ONCE DAILY 90 tablet 1    atorvastatin (LIPITOR) 80 MG tablet Take 1 tablet by mouth daily 90 tablet 1    lisinopril (PRINIVIL;ZESTRIL) 2.5 MG tablet Take 2 tablets by mouth daily 180 tablet 1    fenofibrate (TRIGLIDE) 160 MG tablet Take 1 tablet by mouth daily 90 tablet 1    metoprolol tartrate (LOPRESSOR) 25 MG tablet Take 1 tablet by mouth daily 90 tablet 1    blood glucose test strips (CONTOUR NEXT TEST) strip USE TO TEST TWICE DAILY AND AS NEEDED FOR SYMPTOMS OF  IRREGULAR BLOOD GLUCOSE 300 strip 3    insulin NPH (HUMULIN N KWIKPEN) 100 UNIT/ML injection pen INJECT SUBCUTANEOUSLY 60 UNITS IN THE MORNING. 90 mL 3    metFORMIN (GLUCOPHAGE-XR) 500 MG extended release tablet Take 2 tablets by mouth daily (with breakfast) 180 tablet 3    Semaglutide, 1 MG/DOSE, (OZEMPIC, 1 MG/DOSE,) 4 MG/3ML SOPN Inject 1 mg into the skin every 7 days 9 mL 1     No current facility-administered medications for this visit. Past History    Past Medical History:   has a past medical history of Diabetes mellitus ([address] Dyslipidemia, and Urticaria, chronic. Social History:   reports that she quit smoking about 10 years ago. Her smoking use included cigarettes.  She has a 21.00 pack-year smoking history. She has never used smokeless tobacco. She reports that she does not drink alcohol and does not use drugs. Family History:   Family History   Problem Relation Age of Onset    High Cholesterol Father     High Blood Pressure Father     Heart Disease Father     Diabetes Father     High Cholesterol Sister     High Blood Pressure Sister        Surgical History: History reviewed. No pertinent surgical history. Physical Examination      Vitals:  /82   Pulse 72   Temp 97.3 °F (36.3 °C) (Temporal)   Wt 197 lb (89.4 kg)   SpO2 97%   BMI 29.95 kg/m²     Physical Exam  Vitals and nursing note reviewed. Constitutional:       General: She is not in acute distress. Appearance: Normal appearance. She is overweight. She is not ill-appearing, toxic-appearing or diaphoretic. HENT:      Head: Normocephalic and atraumatic. Eyes:      General: No scleral icterus. Right eye: No discharge. Left eye: No discharge. Extraocular Movements: Extraocular movements intact. Conjunctiva/sclera: Conjunctivae normal.   Cardiovascular:      Rate and Rhythm: Normal rate and regular rhythm. Pulses: Normal pulses. Heart sounds: Normal heart sounds. No murmur heard. No friction rub. No gallop. Pulmonary:      Effort: Pulmonary effort is normal. No respiratory distress. Breath sounds: Normal breath sounds. No stridor. No wheezing, rhonchi or rales. Chest:      Chest wall: No tenderness. Neurological:      Mental Status: She is alert and oriented to person, place, and time. Mental status is at baseline. Psychiatric:         Mood and Affect: Mood normal.         Behavior: Behavior normal.         Thought Content:  Thought content normal.         Judgment: Judgment normal.       Labs/Imaging/Diagnostics   Labs:  Hemoglobin A1C   Date Value Ref Range Status   08/29/2022 6.3 % Final       Imaging Last 24 Hours:  XR RIBS LEFT INCLUDE CHEST (MIN 3

## 2022-08-29 NOTE — PATIENT INSTRUCTIONS
You and your diabetes health professional will figure out the ratio. You can do this by testing your blood sugar after meals. For example, you may need a certain amount of insulin for every 15 grams of carbs. Add up the carb grams in a meal. Then you can figure out how many units of insulin to take based on your insulin-to-carb ratio. Exercise lowers blood sugar. You can use less insulin than you would if you were not doing exercise. Keep in mind that timing matters. If you exercise within 1 hour after a meal, your body may need less insulin for that meal than it would if you exercised 3 hours after the meal. Test your blood sugar to find out how exercise affects your need for insulin. If you do or don't take insulin:  Look at labels on packaged foods. This can tell you how many carbs are in a serving. Be aware of portions, or serving sizes. If a package has two servings and you eat the whole package, you need to double the number of grams of carbohydrate listed for one serving. Protein, fat, and fiber do not raise blood sugar as much as carbs do. If you eat a lot of these nutrients in a meal, your blood sugar will rise more slowly than it would otherwise. Where can you learn more? Go to https://[employer]kids.[employer]. org and sign in to your [employer] account. Enter X469 in the [facility] box to learn more about \"Counting Carbohydrates for Diabetes: Care Instructions. \"     If you do not have an account, please click on the \"Sign Up Now\" link. Current as of: July 28, 2021               Content Version: 13.3  © 2006-2022 Healthwise, Incorporated. Care instructions adapted under license by [facility] ([facility]). If you have questions about a medical condition or this instruction, always ask your healthcare professional. [address] any warranty or liability for your use of this information.

## 2022-08-29 NOTE — ASSESSMENT & PLAN NOTE
Well-controlled, continue current medications, continue current treatment plan, medication adherence emphasized and lifestyle modifications recommended   Discussed possibly increasing Ozempic if needed - discuss with [facility] pharmacist

## 2022-09-06 RX ORDER — FLUTICASONE PROPIONATE 110 UG/1
1 AEROSOL, METERED RESPIRATORY (INHALATION) 2 TIMES DAILY
Qty: 1 EACH | Refills: 3 | Status: SHIPPED | OUTPATIENT
Start: 2022-09-06 | End: 2022-09-08

## 2022-09-06 NOTE — TELEPHONE ENCOUNTER
Da Tejada is calling to request a refill on the following medication(s):    Medication Request:  Requested Prescriptions     Pending Prescriptions Disp Refills    fluticasone (FLOVENT HFA) 110 MCG/ACT inhaler 1 each 3     Sig: Inhale 1 puff into the lungs 2 times daily       Last Visit Date (If Applicable):  9/10/1884    Next Visit Date:    3/2/2023

## 2022-09-08 ENCOUNTER — TELEPHONE (OUTPATIENT)
Dept: FAMILY MEDICINE CLINIC | Age: 67
End: 2022-09-08

## 2022-09-08 RX ORDER — ALBUTEROL SULFATE 90 UG/1
2 AEROSOL, METERED RESPIRATORY (INHALATION) EVERY 6 HOURS PRN
Qty: 18 G | Refills: 3 | Status: SHIPPED | OUTPATIENT
Start: 2022-09-08

## 2022-09-09 RX ORDER — ALBUTEROL SULFATE 90 UG/1
2 AEROSOL, METERED RESPIRATORY (INHALATION) EVERY 6 HOURS PRN
Qty: 54 G | OUTPATIENT
Start: 2022-09-09

## 2022-09-09 NOTE — TELEPHONE ENCOUNTER
Last visit:   Last Med refill:   Does patient have enough medication for 72 hours  Next Visit Date:  Future Appointments   Date Time Provider Urvashi Galarza   10/17/2022  9:00 AM Chetan Reed, [facility] [address]   3/2/2023  3:40 PM Nuris Vasquez,  [address] [facility] Maintenance   Topic Date Due    Annual Wellness Visit (AWV)  Never done    HIV screen  Never done    Hepatitis C screen  Never done    Shingles vaccine (1 of 2) Never done    Low dose CT lung screening  Never done    DEXA (modify frequency per FRAX score)  Never done    Diabetic retinal exam  08/18/2016    Pneumococcal 65+ years Vaccine (2 - PCV) 03/28/2017    Cervical cancer screen  12/21/2019    Diabetic foot exam  03/29/2022    Diabetic microalbuminuria test  03/29/2022    COVID-19 Vaccine (4 - Booster for Moderna series) 05/05/2022    Breast cancer screen  07/08/2022    Flu vaccine (1) 09/01/2022    Lipids  09/09/2022    A1C test (Diabetic or Prediabetic)  08/29/2023    Depression Screen  08/29/2023    DTaP/Tdap/Td vaccine (2 - Td or Tdap) 09/13/2023    Colorectal Cancer Screen  01/10/2027    Hepatitis A vaccine  Aged Out    Hib vaccine  Aged Out    Meningococcal (ACWY) vaccine  Aged Out       Hemoglobin A1C (%)   Date Value   08/29/2022 6.3   02/21/2022 5.9   09/09/2021 7.2             ( goal A1C is < 7)   No results found for: LABMICR  LDL Calculated (mg/dL)   Date Value   09/09/2021 55   03/24/2021 55       (goal LDL is <100)   AST (U/L)   Date Value   02/21/2022 24     ALT (U/L)   Date Value   02/21/2022 26     BUN (mg/dL)   Date Value   02/21/2022 13     BP Readings from Last 3 Encounters:   08/29/22 132/82   07/31/22 123/63   04/21/22 124/76          (goal 120/80)    All Future Testing planned in [facility]  Lab Frequency Next Occurrence               Patient Active Problem List:     Dyslipidemia     Diabetes mellitus (HCC)     Urticaria, chronic     ASHD (arteriosclerotic heart disease)     Primary osteoarthritis involving multiple joints     Elevated liver enzymes     Microalbuminuria

## 2022-09-26 ENCOUNTER — HOSPITAL ENCOUNTER (OUTPATIENT)
Age: 67
Setting detail: SPECIMEN
Discharge: HOME | End: 2022-09-26

## 2022-09-26 ENCOUNTER — TELEPHONE (OUTPATIENT)
Dept: FAMILY MEDICINE CLINIC | Age: 67
End: 2022-09-26

## 2022-09-26 DIAGNOSIS — R30.0 DYSURIA: ICD-10-CM

## 2022-09-26 DIAGNOSIS — R30.0 DYSURIA: Primary | ICD-10-CM

## 2022-09-26 LAB
BACTERIA: ABNORMAL
BILIRUBIN URINE: NEGATIVE
CASTS UA: ABNORMAL /LPF (ref 0–8)
COLOR: ABNORMAL
EPITHELIAL CELLS UA: ABNORMAL /HPF (ref 0–5)
GLUCOSE URINE: NEGATIVE
KETONES, URINE: NEGATIVE
LEUKOCYTE ESTERASE, URINE: ABNORMAL
NITRITE, URINE: POSITIVE
PH UA: 5 (ref 5–8)
PROTEIN UA: ABNORMAL
RBC UA: ABNORMAL /HPF (ref 0–4)
SPECIFIC GRAVITY UA: 1.02 (ref 1–1.03)
TURBIDITY: ABNORMAL
URINE HGB: ABNORMAL
UROBILINOGEN, URINE: NORMAL
WBC UA: ABNORMAL /HPF (ref 0–5)

## 2022-09-26 RX ORDER — NITROFURANTOIN 25; 75 MG/1; MG/1
100 CAPSULE ORAL 2 TIMES DAILY
Qty: 14 CAPSULE | Refills: 0 | Status: SHIPPED | OUTPATIENT
Start: 2022-09-26 | End: 2022-10-03

## 2022-09-26 NOTE — TELEPHONE ENCOUNTER
Patient calling with hematuria, dysuria, frequency, bladder pain/pressure and general not feeling well. Patient states she feels \"blah\". She has no appetite either. We have no available appointments today. Please advise.

## 2022-09-26 NOTE — TELEPHONE ENCOUNTER
Can she come into either the office or just the lab upstairs and give a urine sample today? I'll order the tests and send in an antibiotic, do not start the antibiotic until after urine specimen is given.  Thank you

## 2022-09-28 LAB
CULTURE: ABNORMAL
SPECIMEN DESCRIPTION: ABNORMAL

## 2022-10-17 ENCOUNTER — PHARMACY VISIT (OUTPATIENT)
Dept: FAMILY MEDICINE CLINIC | Age: 67
End: 2022-10-17

## 2022-10-17 DIAGNOSIS — Z79.899 MEDICATION MANAGEMENT: Primary | ICD-10-CM

## 2022-10-17 PROCEDURE — APPNB60 APP NON BILLABLE TIME 46-60 MINS: Performed by: PHARMACIST

## 2022-10-17 PROCEDURE — 99999 PR OFFICE/OUTPT VISIT,PROCEDURE ONLY: CPT | Performed by: PHARMACIST

## 2022-10-17 NOTE — PROGRESS NOTES
Medication Management Service  [facility]  [phone]      Deja Wolf is a 74 y.o. female that presents for a follow-up diabetes mellitus telephone visit with Medication Management Service per referral from Dr. Nishi Pierre DO for Diabetes Management under Collaborative Practice Agreement with A1c goal < 7 %. Has appointment scheduled for Friday to evaluate shortness of breath, cough, low energy. Been going on for 3 months. Patient shared that she does not feel like the albuterol inhaler is helping with symptoms. Subjective/Objective     New Problems/Changes:  HgbA1c last reported as 5.9% on 02/21/22. DIET    Patient reported that her diet has continued to improve and she has had less \"cheat\" meals as of late. Most days patient reports not be being hungry. Will eat breakfast and lunch. May not eat dinner. Will have a snack or two during the day. EXERCISE    Patient continues to remain active with day to day activities around the house. 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 WEIGHT  92.1 kg on 04/21/2022  92.7 kg on 04/04/2022  103.6 kg on 07/15/2021  102.2 kg on 6/17/2021    BMI: 33.78 kg/m2     DIABETES MANAGEMENT    Diabetes Goals: Using ADA Standards of Care     Goal A1c:  Less than 7 %   Fasting Blood Sugars:   mg/dL  Postprandial glucose:  Less than 180 mg/dL     Lab Results   Component Value Date    LABA1C 6.3 08/29/2022    LABA1C 5.9 02/21/2022    LABA1C 7.2 09/09/2021     Current DM Medications    Medication Sig Comments    insulin NPH (HUMULIN N KWIKPEN) 100 UNIT/ML injection pen INJECT SUBCUTANEOUSLY 60 UNITS IN THE MORNING. Taking as prescribed. metFORMIN (GLUCOPHAGE-XR) 500 MG extended release tablet Take 2 tablets by mouth daily (with breakfast) Taking as prescribed.     Semaglutide, 1 MG/DOSE, (OZEMPIC, 1 MG/DOSE,) 4 MG/3ML SOPN Inject 1 mg into the skin every 7 days Taking on Mondays. Denies any GI side effects. Continues to report some weight loss. Now down 6 more pounds. Home Glucose Readings:  Patient reported a fasting blood glucose reading of 123, 127, 119 mg/dL from this weekend. Hypoglycemia:   No reports of hypoglycemia with this call. Renal monitoring:    Lab Results   Component Value Date/Time    MICROALBUR 10 03/29/2021 04:29 PM     CrCl cannot be calculated (Patient's most recent lab result is older than the maximum 180 days allowed. ). BLOOD PRESSURE MANAGEMENT    BP Readings from Last 3 Encounters:   08/29/22 132/82   07/31/22 123/63   04/21/22 124/76     CHOLESTEROL MANAGEMENT    Lab Results   Component Value Date    CHOL 111 09/09/2021    TRIG 101 09/09/2021    HDL 38 09/09/2021    LDLCALC 55 09/09/2021     ALT   Date Value Ref Range Status   02/21/2022 26 U/L Final     AST   Date Value Ref Range Status   02/21/2022 24 U/L Final     The ASCVD Risk score (Airam PENA, et al., 2019) failed to calculate for the following reasons:     The valid total cholesterol range is 130 to 320 mg/dL     MEDICATIONS    New/Discontinued medications since last encounter:   None noted    Current Outpatient Medications   Medication Sig Dispense Refill    albuterol sulfate HFA (PROVENTIL HFA) 108 (90 Base) MCG/ACT inhaler Inhale 2 puffs into the lungs every 6 hours as needed for Wheezing 18 g 3    Insulin Pen Needle 31G X 5 MM MISC Use 1 pen needle for daily insulin injection 100 each 3    aspirin (ASPIRIN LOW DOSE) 81 MG EC tablet TAKE 1 TABLET BY MOUTH ONCE DAILY 90 tablet 1    atorvastatin (LIPITOR) 80 MG tablet Take 1 tablet by mouth daily 90 tablet 1    lisinopril (PRINIVIL;ZESTRIL) 2.5 MG tablet Take 2 tablets by mouth daily 180 tablet 1    fenofibrate (TRIGLIDE) 160 MG tablet Take 1 tablet by mouth daily 90 tablet 1    metoprolol tartrate (LOPRESSOR) 25 MG tablet Take 1 tablet by mouth daily 90 tablet 1    blood glucose test strips (CONTOUR NEXT TEST) strip USE TO TEST TWICE DAILY AND AS NEEDED FOR SYMPTOMS OF  IRREGULAR BLOOD GLUCOSE 300 strip 3    insulin NPH (HUMULIN N KWIKPEN) 100 UNIT/ML injection pen INJECT SUBCUTANEOUSLY 60 UNITS IN THE MORNING. 90 mL 3    metFORMIN (GLUCOPHAGE-XR) 500 MG extended release tablet Take 2 tablets by mouth daily (with breakfast) 180 tablet 3    Semaglutide, 1 MG/DOSE, (OZEMPIC, 1 MG/DOSE,) 4 MG/3ML SOPN Inject 1 mg into the skin every 7 days 9 mL 1     No current facility-administered medications for this visit. On Statin: Yes    On ACE-I or ARB for HTN or Microalbuminuria: Yes    Medication Adherence/Cost/Adverse Events:   Approved for Ozempic through [employer] through 12/31/2022. Will need to reapply for 2023. Assessment/Plan     Diabetes Management:     Fasting blood glucose readings provided by patient were at goal.      Plan for patient to continue with Ozempic 1 mg weekly, 60 units of NPH every morning, and metformin  mg tablets 2 tablets daily with breakfast.     Office visit scheduled for [email or web address] to evaluate blood glucose readings, current DM medications, and completing the application for receiving Ozempic through [employer] Patient Assistance Program for 2023. Patient will continue to check blood glucose at least twice daily or for signs/symptoms of hypoglycemia. Patient will continue to work on diet management, consistent exercise/activity, and adherence with prescribed medications. Future Consideration:  Increasing Ozempic to 2mg per week and then potentially decreasing Humulin N. Would like to increase to 2mg using office sample of Ozempic. Next Follow-Up:    Next PCP appointment scheduled for 10/21/2022 for evaluation of shortness of breath, cough, and low energy     Meds Mgmt Telephone office visit on [email or web address] as noted above. Progress note sent to referring provider.  (Dr Christel Worley)     Patient verbalized understanding of care plan. Patient advised to call Medication Management with any questions, concerns, or changes prior to next appointment. Patient acknowledges working in a consult agreement with the pharmacist as referred by his/her physician. Meg Sharif, Pharm. D., [address] Medication Management Service  [phone]  10/17/2022  9:05 AM      ==================================================================    For Pharmacy Admin Tracking Only    CPA in place:  Yes  Recommendation Provided To: Patient/Caregiver: 1 via Telephone  Intervention Detail: Scheduled Appointment  Intervention Accepted By: Patient/Caregiver: 1  Time Spent (min): 60

## 2022-10-17 NOTE — Clinical Note
Hi Dr Luis Miguel Burns,  Blood glucose appears to be at goal with reported readings. Patient continues to not feel well and has follow up PCP appointment scheduled for Friday (10/21/2022-for evaluation of shortness of breath, cough and weakness. Shared that albuterol inhaler does not seem to help symptoms). No changes today to DM medications as blood glucose continues to be at goal.  Discussion around increasing Ozempic to 2mg weekly in the future perhaps once patient is feeling better. (Concern about causing side effects with the in increase in Ozempic dose on top of the breathing problems) Next Meds Mgmt appointment scheduled for 11/17/2022 and at that time will complete re-enrollment application for Ozempic. Crystal Castellanos, Pharm. D., [address] Medication Management Service [phone] 10/17/2022 9:56 AM

## 2022-10-21 ENCOUNTER — OFFICE VISIT (OUTPATIENT)
Dept: FAMILY MEDICINE CLINIC | Age: 67
End: 2022-10-21
Payer: MEDICARE

## 2022-10-21 ENCOUNTER — HOSPITAL ENCOUNTER (OUTPATIENT)
Age: 67
Discharge: HOME | End: 2022-10-21
Payer: MEDICARE

## 2022-10-21 ENCOUNTER — HOSPITAL ENCOUNTER (OUTPATIENT)
Age: 67
Discharge: HOME | End: 2022-10-23
Payer: MEDICARE

## 2022-10-21 ENCOUNTER — HOSPITAL ENCOUNTER (OUTPATIENT)
Dept: GENERAL RADIOLOGY | Age: 67
Discharge: HOME | End: 2022-10-23
Payer: MEDICARE

## 2022-10-21 VITALS
BODY MASS INDEX: 29.19 KG/M2 | HEART RATE: 88 BPM | OXYGEN SATURATION: 99 % | WEIGHT: 192 LBS | DIASTOLIC BLOOD PRESSURE: 70 MMHG | SYSTOLIC BLOOD PRESSURE: 120 MMHG

## 2022-10-21 DIAGNOSIS — I25.10 ASHD (ARTERIOSCLEROTIC HEART DISEASE): ICD-10-CM

## 2022-10-21 DIAGNOSIS — R06.02 SOBOE (SHORTNESS OF BREATH ON EXERTION): ICD-10-CM

## 2022-10-21 DIAGNOSIS — E11.9 TYPE 2 DIABETES MELLITUS WITHOUT COMPLICATION, UNSPECIFIED WHETHER LONG TERM INSULIN USE (HCC): ICD-10-CM

## 2022-10-21 DIAGNOSIS — E78.5 DYSLIPIDEMIA: ICD-10-CM

## 2022-10-21 DIAGNOSIS — R05.3 CHRONIC COUGH: ICD-10-CM

## 2022-10-21 DIAGNOSIS — R06.02 SOBOE (SHORTNESS OF BREATH ON EXERTION): Primary | ICD-10-CM

## 2022-10-21 LAB
ABSOLUTE EOS #: 0.08 K/UL (ref 0–0.44)
ABSOLUTE IMMATURE GRANULOCYTE: 0.04 K/UL (ref 0–0.3)
ABSOLUTE LYMPH #: 2.8 K/UL (ref 1.1–3.7)
ABSOLUTE MONO #: 0.92 K/UL (ref 0.1–1.2)
ALBUMIN SERPL-MCNC: 3.6 G/DL (ref 3.5–5.2)
ALBUMIN/GLOBULIN RATIO: 0.9 (ref 1–2.5)
ALP BLD-CCNC: 52 U/L (ref 35–104)
ALT SERPL-CCNC: 12 U/L (ref 5–33)
ANION GAP SERPL CALCULATED.3IONS-SCNC: 9 MMOL/L (ref 9–17)
AST SERPL-CCNC: 18 U/L
BASOPHILS # BLD: 1 % (ref 0–2)
BASOPHILS ABSOLUTE: 0.07 K/UL (ref 0–0.2)
BILIRUB SERPL-MCNC: 0.4 MG/DL (ref 0.3–1.2)
BUN BLDV-MCNC: 16 MG/DL (ref 8–23)
CALCIUM SERPL-MCNC: 9.6 MG/DL (ref 8.6–10.4)
CHLORIDE BLD-SCNC: 103 MMOL/L (ref 98–107)
CO2: 29 MMOL/L (ref 20–31)
CREAT SERPL-MCNC: 0.68 MG/DL (ref 0.5–0.9)
EOSINOPHILS RELATIVE PERCENT: 1 % (ref 1–4)
GFR SERPL CREATININE-BSD FRML MDRD: >60 ML/MIN/1.73M2
GLUCOSE BLD-MCNC: 96 MG/DL (ref 70–99)
HCT VFR BLD CALC: 32.4 % (ref 36.3–47.1)
HEMOGLOBIN: 11.1 G/DL (ref 11.9–15.1)
IMMATURE GRANULOCYTES: 0 %
LYMPHOCYTES # BLD: 27 % (ref 24–43)
MCH RBC QN AUTO: 34 PG (ref 25.2–33.5)
MCHC RBC AUTO-ENTMCNC: 34.3 G/DL (ref 28.4–34.8)
MCV RBC AUTO: 99.4 FL (ref 82.6–102.9)
MONOCYTES # BLD: 9 % (ref 3–12)
NRBC AUTOMATED: 0 PER 100 WBC
PDW BLD-RTO: 13.8 % (ref 11.8–14.4)
PLATELET # BLD: 321 K/UL (ref 138–453)
PMV BLD AUTO: 12.1 FL (ref 8.1–13.5)
POTASSIUM SERPL-SCNC: 4 MMOL/L (ref 3.7–5.3)
PRO-BNP: 80 PG/ML
RBC # BLD: 3.26 M/UL (ref 3.95–5.11)
SEG NEUTROPHILS: 62 % (ref 36–65)
SEGMENTED NEUTROPHILS ABSOLUTE COUNT: 6.37 K/UL (ref 1.5–8.1)
SODIUM BLD-SCNC: 141 MMOL/L (ref 135–144)
TOTAL PROTEIN: 7.4 G/DL (ref 6.4–8.3)
WBC # BLD: 10.3 K/UL (ref 3.5–11.3)

## 2022-10-21 PROCEDURE — 3044F HG A1C LEVEL LT 7.0%: CPT | Performed by: FAMILY MEDICINE

## 2022-10-21 PROCEDURE — 71046 X-RAY EXAM CHEST 2 VIEWS: CPT

## 2022-10-21 PROCEDURE — 99214 OFFICE O/P EST MOD 30 MIN: CPT | Performed by: FAMILY MEDICINE

## 2022-10-21 PROCEDURE — 80053 COMPREHEN METABOLIC PANEL: CPT

## 2022-10-21 PROCEDURE — 36415 COLL VENOUS BLD VENIPUNCTURE: CPT

## 2022-10-21 PROCEDURE — 83880 ASSAY OF NATRIURETIC PEPTIDE: CPT

## 2022-10-21 PROCEDURE — 85025 COMPLETE CBC W/AUTO DIFF WBC: CPT

## 2022-10-21 PROCEDURE — 1123F ACP DISCUSS/DSCN MKR DOCD: CPT | Performed by: FAMILY MEDICINE

## 2022-10-26 ENCOUNTER — HOSPITAL ENCOUNTER (OUTPATIENT)
Dept: NON INVASIVE DIAGNOSTICS | Age: 67
Discharge: HOME | End: 2022-10-28
Payer: MEDICARE

## 2022-10-26 ENCOUNTER — PATIENT MESSAGE (OUTPATIENT)
Dept: FAMILY MEDICINE CLINIC | Age: 67
End: 2022-10-26

## 2022-10-26 DIAGNOSIS — I25.10 ATHEROSCLEROSIS OF CORONARY ARTERY, UNSPECIFIED VESSEL OR LESION TYPE, UNSPECIFIED WHETHER ANGINA PRESENT, UNSPECIFIED WHETHER NATIVE OR TRANSPLANTED HEART: ICD-10-CM

## 2022-10-26 DIAGNOSIS — R06.02 SOBOE (SHORTNESS OF BREATH ON EXERTION): Primary | ICD-10-CM

## 2022-10-26 DIAGNOSIS — R05.3 CHRONIC COUGH: ICD-10-CM

## 2022-10-26 DIAGNOSIS — R06.02 SHORTNESS OF BREATH: ICD-10-CM

## 2022-10-26 LAB
LV EF: 55 %
LVEF MODALITY: NORMAL

## 2022-10-26 PROCEDURE — 93306 TTE W/DOPPLER COMPLETE: CPT

## 2022-10-26 NOTE — TELEPHONE ENCOUNTER
From: Tamara Whelan  To: Dr. Yvan Rodriguez  Sent: 10/26/2022 12:01 PM EDT  Subject: shortness of breath - coughing - fatigue    All three tests you ordered have come back good. What is the next thing we can do to figure out why I am having this shortness of breath - coughing and fatigue?      Thanks

## 2022-11-01 ASSESSMENT — ENCOUNTER SYMPTOMS
GASTROINTESTINAL NEGATIVE: 1
ABDOMINAL PAIN: 0
WHEEZING: 0
SORE THROAT: 0
VOMITING: 0
SWOLLEN GLANDS: 0
ORTHOPNEA: 0
ALLERGIC/IMMUNOLOGIC NEGATIVE: 1
VISUAL CHANGE: 0
HEMOPTYSIS: 0
SHORTNESS OF BREATH: 1
BLURRED VISION: 0
CHEST TIGHTNESS: 0
SPUTUM PRODUCTION: 0
RHINORRHEA: 0
EYES NEGATIVE: 1

## 2022-11-01 NOTE — PROGRESS NOTES
[facility] Progress Note    Date:10/21/2022         Patient Sophie Powell     Date of Birth:[birth year]     Age:67 y.o. Assessment/Plan        Problem List Items Addressed This Visit          Circulatory    ASHD (arteriosclerotic heart disease)      At goal, continue current medications and continue current treatment plan         Relevant Orders    Echocardiogram transthoracic    CBC with Auto Differential (Completed)    Comprehensive Metabolic Panel (Completed)       Endocrine    Diabetes mellitus ([address]      Well-controlled, continue current medications, continue current treatment plan, medication adherence emphasized and lifestyle modifications recommended         Relevant Orders    CBC with Auto Differential (Completed)    Comprehensive Metabolic Panel (Completed)       Other    Dyslipidemia      Well-controlled, continue current medications, continue current treatment plan, medication adherence emphasized and lifestyle modifications recommended         Relevant Orders    CBC with Auto Differential (Completed)    Comprehensive Metabolic Panel (Completed)     Other Visit Diagnoses       SOBOE (shortness of breath on exertion)    -  Primary    Unclear etiology    Relevant Orders    XR CHEST (2 VW) (Completed)    Echocardiogram transthoracic    CBC with Auto Differential (Completed)    Comprehensive Metabolic Panel (Completed)    Brain Natriuretic Peptide (Completed)    Chronic cough        Relevant Orders    XR CHEST (2 VW) (Completed)    CBC with Auto Differential (Completed)    Comprehensive Metabolic Panel (Completed)             Return if symptoms worsen or fail to improve. Electronically signed by Lawson Cardenas DO on 11/1/22       Total time spent was between Time personally spent assessing and managing the patient on the date of service: Est: 30-39 minutes ([id number]) mins.  This included time spent reviewing the patient's medical record (e.g., recent visits, labs, and studies); seeing the patient in the office (face-to-face time); ordering medications, studies, procedures, or referrals; calling the patient or family later in the day with results and further recommendations; and documenting the visit in the medical record. Nafisa Kwon is a 74 y.o. female presenting today for   Chief Complaint   Patient presents with    Shortness of Breath   . Shortness of Breath  This is a recurrent problem. The current episode started more than 1 month ago. The problem occurs intermittently. The problem has been waxing and waning. Pertinent negatives include no abdominal pain, chest pain, claudication, coryza, ear pain, fever, headaches, hemoptysis, leg pain, leg swelling, neck pain, orthopnea, PND, rash, rhinorrhea, sore throat, sputum production, swollen glands, syncope, vomiting or wheezing. Her past medical history is significant for CAD. Diabetes  She presents for her follow-up diabetic visit. She has type 2 diabetes mellitus. Her disease course has been improving. There are no hypoglycemic associated symptoms. Pertinent negatives for hypoglycemia include no dizziness or headaches. There are no diabetic associated symptoms. Pertinent negatives for diabetes include no blurred vision, no chest pain, no fatigue, no foot paresthesias, no foot ulcerations, no polydipsia, no polyphagia, no polyuria, no visual change, no weakness and no weight loss. Symptoms are stable. Current diabetic treatment includes insulin injections and oral agent (monotherapy). She is compliant with treatment most of the time. Her weight is increasing steadily. Her home blood glucose trend is increasing steadily. An ACE inhibitor/angiotensin II receptor blocker is being taken. Hyperlipidemia  This is a chronic problem. The current episode started more than 1 year ago. Exacerbating diseases include diabetes, liver disease and obesity. She has no history of chronic renal disease, hypothyroidism or nephrotic syndrome. Associated symptoms include shortness of breath. Pertinent negatives include no chest pain, focal sensory loss, focal weakness, leg pain or myalgias. Current antihyperlipidemic treatment includes statins and fibric acid derivatives. The current treatment provides moderate improvement of lipids. Coronary Artery Disease  Presents for follow-up visit. Symptoms include shortness of breath. Pertinent negatives include no chest pain, chest pressure, chest tightness, dizziness, leg swelling, muscle weakness, palpitations or weight gain. Risk factors include obesity. The symptoms have been stable. Compliance with diet is variable. Compliance with exercise is variable. Compliance with medications is good. Review of Systems   Review of Systems   Constitutional: Negative. Negative for fatigue, fever, weight gain and weight loss. HENT: Negative. Negative for ear pain, rhinorrhea and sore throat. Eyes: Negative. Negative for blurred vision. Respiratory:  Positive for shortness of breath. Negative for hemoptysis, sputum production, chest tightness and wheezing. Cardiovascular: Negative. Negative for chest pain, palpitations, orthopnea, claudication, leg swelling, syncope and PND. Gastrointestinal: Negative. Negative for abdominal pain and vomiting. Endocrine: Negative. Negative for polydipsia, polyphagia and polyuria. Genitourinary: Negative. Musculoskeletal: Negative. Negative for myalgias, muscle weakness and neck pain. Skin: Negative. Negative for rash. Allergic/Immunologic: Negative. Neurological: Negative. Negative for dizziness, focal weakness, weakness and headaches. Hematological: Negative. Psychiatric/Behavioral: Negative. All other systems reviewed and are negative.     Medications     Current Outpatient Medications   Medication Sig Dispense Refill    albuterol sulfate HFA (PROVENTIL HFA) 108 (90 Base) MCG/ACT inhaler Inhale 2 puffs into the lungs every 6 hours as needed for Wheezing 18 g 3    Insulin Pen Needle 31G X 5 MM MISC Use 1 pen needle for daily insulin injection 100 each 3    aspirin (ASPIRIN LOW DOSE) 81 MG EC tablet TAKE 1 TABLET BY MOUTH ONCE DAILY 90 tablet 1    atorvastatin (LIPITOR) 80 MG tablet Take 1 tablet by mouth daily 90 tablet 1    lisinopril (PRINIVIL;ZESTRIL) 2.5 MG tablet Take 2 tablets by mouth daily 180 tablet 1    fenofibrate (TRIGLIDE) 160 MG tablet Take 1 tablet by mouth daily 90 tablet 1    metoprolol tartrate (LOPRESSOR) 25 MG tablet Take 1 tablet by mouth daily 90 tablet 1    blood glucose test strips (CONTOUR NEXT TEST) strip USE TO TEST TWICE DAILY AND AS NEEDED FOR SYMPTOMS OF  IRREGULAR BLOOD GLUCOSE 300 strip 3    insulin NPH (HUMULIN N KWIKPEN) 100 UNIT/ML injection pen INJECT SUBCUTANEOUSLY 60 UNITS IN THE MORNING. 90 mL 3    metFORMIN (GLUCOPHAGE-XR) 500 MG extended release tablet Take 2 tablets by mouth daily (with breakfast) 180 tablet 3    Semaglutide, 1 MG/DOSE, (OZEMPIC, 1 MG/DOSE,) 4 MG/3ML SOPN Inject 1 mg into the skin every 7 days 9 mL 1     No current facility-administered medications for this visit. Past History    Past Medical History:   has a past medical history of Diabetes mellitus ([address] Dyslipidemia, and Urticaria, chronic. Social History:   reports that she quit smoking about 10 years ago. Her smoking use included cigarettes. She has a 21.00 pack-year smoking history. She has never used smokeless tobacco. She reports that she does not drink alcohol and does not use drugs. Family History:   Family History   Problem Relation Age of Onset    High Cholesterol Father     High Blood Pressure Father     Heart Disease Father     Diabetes Father     High Cholesterol Sister     High Blood Pressure Sister        Surgical History: History reviewed. No pertinent surgical history.      Physical Examination      Vitals:  /70   Pulse 88   Wt 192 lb (87.1 kg)   SpO2 99%   BMI 29.19 kg/m²     Physical Exam  Vitals and nursing note reviewed. Constitutional:       General: She is not in acute distress. Appearance: Normal appearance. She is overweight. She is not ill-appearing, toxic-appearing or diaphoretic. HENT:      Head: Normocephalic and atraumatic. Eyes:      General: No scleral icterus. Right eye: No discharge. Left eye: No discharge. Extraocular Movements: Extraocular movements intact. Conjunctiva/sclera: Conjunctivae normal.   Cardiovascular:      Rate and Rhythm: Normal rate and regular rhythm. Pulses: Normal pulses. Heart sounds: Normal heart sounds. No murmur heard. No friction rub. No gallop. Pulmonary:      Effort: Pulmonary effort is normal. No respiratory distress. Breath sounds: Normal breath sounds. No stridor. No wheezing, rhonchi or rales. Chest:      Chest wall: No tenderness. Neurological:      Mental Status: She is alert and oriented to person, place, and time. Mental status is at baseline. Psychiatric:         Mood and Affect: Mood normal.         Behavior: Behavior normal.         Thought Content: Thought content normal.         Judgment: Judgment normal.       Labs/Imaging/Diagnostics   Labs:  Hemoglobin A1C   Date Value Ref Range Status   08/29/2022 6.3 % Final       Imaging Last 24 Hours:  XR CHEST (2 VW)  Narrative: EXAMINATION:  TWO XRAY VIEWS OF THE CHEST    10/21/2022 1:13 pm    COMPARISON:  03/31/2022. HISTORY:  ORDERING SYSTEM PROVIDED HISTORY: SOBOE (shortness of breath on exertion)  TECHNOLOGIST PROVIDED HISTORY:  See ICDM-10 code attached  Reason for Exam: SOB upon exertion. Chronic cough. FINDINGS:  The heart size is within normal limits. The pulmonary vasculature is also  within normal limits. No acute infiltrates are seen. The costophrenic angles  are sharp bilaterally. No pneumothoraces are noted. There is scarring within  the lingula. Impression: 1. No active pulmonary disease.

## 2022-11-09 DIAGNOSIS — I25.10 ASHD (ARTERIOSCLEROTIC HEART DISEASE): ICD-10-CM

## 2022-11-09 DIAGNOSIS — R80.9 MICROALBUMINURIA: ICD-10-CM

## 2022-11-09 DIAGNOSIS — E78.5 DYSLIPIDEMIA: ICD-10-CM

## 2022-11-09 RX ORDER — ATORVASTATIN CALCIUM 80 MG/1
80 TABLET, FILM COATED ORAL DAILY
Qty: 90 TABLET | Refills: 3 | Status: SHIPPED | OUTPATIENT
Start: 2022-11-09

## 2022-11-09 RX ORDER — FENOFIBRATE 160 MG/1
160 TABLET ORAL DAILY
Qty: 90 TABLET | Refills: 3 | Status: SHIPPED | OUTPATIENT
Start: 2022-11-09

## 2022-11-09 RX ORDER — LISINOPRIL 2.5 MG/1
5 TABLET ORAL DAILY
Qty: 180 TABLET | Refills: 3 | Status: SHIPPED | OUTPATIENT
Start: 2022-11-09

## 2022-11-09 NOTE — TELEPHONE ENCOUNTER
Deja Wolf is calling to request a refill on the following medication(s):    Medication Request:  Requested Prescriptions     Pending Prescriptions Disp Refills    lisinopril (PRINIVIL;ZESTRIL) 2.5 MG tablet [Pharmacy Med Name: Lisinopril 2.5 MG Oral Tablet] 180 tablet 3     Sig: TAKE 2 TABLETS BY MOUTH  DAILY    fenofibrate (TRIGLIDE) 160 MG tablet [Pharmacy Med Name: Fenofibrate 160 MG Oral Tablet] 90 tablet 3     Sig: TAKE 1 TABLET BY MOUTH  DAILY    atorvastatin (LIPITOR) 80 MG tablet [Pharmacy Med Name: Atorvastatin Calcium 80 MG Oral Tablet] 90 tablet 3     Sig: TAKE 1 TABLET BY MOUTH  DAILY    metoprolol tartrate (LOPRESSOR) 25 MG tablet [Pharmacy Med Name: Metoprolol Tartrate 25 MG Oral Tablet] 90 tablet 3     Sig: TAKE 1 TABLET BY MOUTH  DAILY       Last Visit Date (If Applicable):  79/77/1444    Next Visit Date:    3/2/2023

## 2022-12-05 ENCOUNTER — OFFICE VISIT (OUTPATIENT)
Dept: FAMILY MEDICINE CLINIC | Age: 67
End: 2022-12-05

## 2022-12-05 VITALS — BODY MASS INDEX: 29.16 KG/M2 | WEIGHT: 191.8 LBS

## 2022-12-05 DIAGNOSIS — Z79.899 MEDICATION MANAGEMENT: Primary | ICD-10-CM

## 2022-12-05 PROCEDURE — APPNB60 APP NON BILLABLE TIME 46-60 MINS: Performed by: PHARMACIST

## 2022-12-05 PROCEDURE — 99999 PR OFFICE/OUTPT VISIT,PROCEDURE ONLY: CPT | Performed by: PHARMACIST

## 2022-12-05 RX ORDER — MONTELUKAST SODIUM 10 MG/1
10 TABLET ORAL NIGHTLY
COMMUNITY

## 2022-12-05 NOTE — PROGRESS NOTES
Medication Management Service  [facility]  [phone]      Brendan See is a 74 y.o. female that presents for a follow-up diabetes mellitus telephone visit with Medication Management Service per referral from Dr. Janny Dee DO for Diabetes Management under Collaborative Practice Agreement with A1c goal < 7 %. Patient has seen Pulmonology for shortness of breath. Was told by provider to stop lisinopril that it may be affecting breathing. Patient reports that after taking prednisone, adding montelukast and stopping lisinopril that she is no longer having breathing problems. Cough is gone also. Talks about having energy back. Feeling great. Subjective/Objective     New Problems/Changes:  HgbA1c last reported as 6.3% on 8/29/2022. DIET    Patient reported that her diet has continued to improve and she has had less \"cheat\" meals as of late. Most days patient reports not be being hungry. Will eat breakfast and lunch. May not eat dinner. Will have a snack or two during the day. No changes noted. More salads, meat and potatoes, casseroles, etc.  Likes the frozen cokes on occasion. EXERCISE    Patient continues to remain active with day to day activities around the house. 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 Still active. No changes. WEIGHT  87 kg on 12/05/2022    BMI: 29.16 kg/m2     DIABETES MANAGEMENT    Diabetes Goals: Using ADA Standards of Care     Goal A1c:  Less than 7 %   Fasting Blood Sugars:   mg/dL  Postprandial glucose:  Less than 180 mg/dL     Lab Results   Component Value Date    LABA1C 6.3 08/29/2022    LABA1C 5.9 02/21/2022    LABA1C 7.2 09/09/2021     Current DM Medications    Medication Sig Comments    insulin NPH (HUMULIN N KWIKPEN) 100 UNIT/ML injection pen INJECT SUBCUTANEOUSLY 60 UNITS IN THE MORNING.  Taking as prescribed. metFORMIN (GLUCOPHAGE-XR) 500 MG extended release tablet Take 2 tablets by mouth daily (with breakfast) Taking as prescribed. Semaglutide, 1 MG/DOSE, (OZEMPIC, 1 MG/DOSE,) 4 MG/3ML SOPN Inject 1 mg into the skin every 7 days Taking on Mondays. Denies any GI side effects. Continues to report some weight loss. Now down 6 more pounds. Home Glucose Readings:  Fasting blood glucose:  Around 117mg/dL. Highest was 129mg/dL     Hypoglycemia:   No reports of hypoglycemia. Renal monitoring:    Lab Results   Component Value Date/Time    MICROALBUR 10 03/29/2021 04:29 PM     CrCl cannot be calculated (Unknown ideal weight.). BLOOD PRESSURE MANAGEMENT    BP Readings from Last 3 Encounters:   10/21/22 120/70   08/29/22 132/82   07/31/22 123/63     CHOLESTEROL MANAGEMENT    Lab Results   Component Value Date    CHOL 111 09/09/2021    TRIG 101 09/09/2021    HDL 38 09/09/2021    LDLCALC 55 09/09/2021     ALT   Date Value Ref Range Status   10/21/2022 12 5 - 33 U/L Final     AST   Date Value Ref Range Status   10/21/2022 18 <32 U/L Final     The ASCVD Risk score (Airam DK, et al., 2019) failed to calculate for the following reasons:     The valid total cholesterol range is 130 to 320 mg/dL     MEDICATIONS    New/Discontinued medications since last encounter:   None noted    Current Outpatient Medications   Medication Sig Dispense Refill    lisinopril (PRINIVIL;ZESTRIL) 2.5 MG tablet TAKE 2 TABLETS BY MOUTH  DAILY 180 tablet 3    fenofibrate (TRIGLIDE) 160 MG tablet TAKE 1 TABLET BY MOUTH  DAILY 90 tablet 3    atorvastatin (LIPITOR) 80 MG tablet TAKE 1 TABLET BY MOUTH  DAILY 90 tablet 3    metoprolol tartrate (LOPRESSOR) 25 MG tablet TAKE 1 TABLET BY MOUTH  DAILY 90 tablet 3    albuterol sulfate HFA (PROVENTIL HFA) 108 (90 Base) MCG/ACT inhaler Inhale 2 puffs into the lungs every 6 hours as needed for Wheezing 18 g 3    Insulin Pen Needle 31G X 5 MM MISC Use 1 pen needle for daily insulin injection 100 each 3    aspirin (ASPIRIN LOW DOSE) 81 MG EC tablet TAKE 1 TABLET BY MOUTH ONCE DAILY 90 tablet 1    blood glucose test strips (CONTOUR NEXT TEST) strip USE TO TEST TWICE DAILY AND AS NEEDED FOR SYMPTOMS OF  IRREGULAR BLOOD GLUCOSE 300 strip 3    insulin NPH (HUMULIN N KWIKPEN) 100 UNIT/ML injection pen INJECT SUBCUTANEOUSLY 60 UNITS IN THE MORNING. 90 mL 3    metFORMIN (GLUCOPHAGE-XR) 500 MG extended release tablet Take 2 tablets by mouth daily (with breakfast) 180 tablet 3    Semaglutide, 1 MG/DOSE, (OZEMPIC, 1 MG/DOSE,) 4 MG/3ML SOPN Inject 1 mg into the skin every 7 days 9 mL 1     No current facility-administered medications for this visit. On Statin: Yes    On ACE-I or ARB for HTN or Microalbuminuria: Yes    Medication Adherence/Cost/Adverse Events:   Approved for Ozempic through [employer] through 12/31/2022. Will need to reapply for 2023. Assessment/Plan     Diabetes Management:     Fasting blood glucose readings provided by patient were at goal.      Plan for patient to continue with Ozempic 1 mg weekly, 60 units of NPH every morning, and metformin  mg tablets 2 tablets daily with breakfast. [employer] re-enrollment application signed by patient. Will fax once PCP signatures obtained. Did decide to add Novolin N as patient had been paying $120 per month for Humulin N    Telephone call scheduled for Marco [email or web address] to evaluate blood glucose readings, current DM medications, and acceptance into [facility] patient assistance program for 2023. Request assistance with Ozempic 1mg wqeekly and added Novolin since patient has been paying $120 per month. completing the application for receiving Ozempic through [employer] Patient Assistance Program for 2023. Patient will continue to check blood glucose at least twice daily or for signs/symptoms of hypoglycemia.   Patient will continue to work on diet management, consistent exercise/activity, and adherence with prescribed medications. Future Consideration:  SGLT2i if need identified for renal protective effect since stopping lisinopril-although patient may make to much. Will need to verify income eligibility if need arises. Will not increase Ozempic to 2mg weekly at this time. Blood glucose readings appear to be at goal.  Patient is tolerating therapy well. With increase to 2mg weekly, would not anticipate being able to stop NPH. Might be closer if an SGT2i is started in conjuction? Next Follow-Up:    Next PCP appointment scheduled for 03/02/2023 for evaluation of shortness of breath, cough, and low energy     Meds Mgmt Telephone office visit on [email or web address] as noted above. Progress note sent to referring provider. (Dr Bryant Márquez)     Patient verbalized understanding of care plan. Patient advised to call Medication Management with any questions, concerns, or changes prior to next appointment. Patient acknowledges working in a consult agreement with the pharmacist as referred by his/her physician. Caro Hernandez, Pharm. D., [address] Medication Management Service  [phone]  12/5/2022  3:38 PM      ==================================================================    For Pharmacy Admin Tracking Only    CPA in place:  Yes  Recommendation Provided To: Patient/Caregiver: 2 via In person  Intervention Detail: Patient Access Assistance/Sample Provided and Scheduled Appointment  Intervention Accepted By: Patient/Caregiver: 2  Time Spent (min): 60

## 2022-12-05 NOTE — Clinical Note
Hi Dr Gerardo Hanson,  Decided to not make any changes to DM medications. Patient is tolerating well. Blood glucose appears to be under good control. Completed Re-Enrollment application for Ozempic. Will fax after I get your signature. Decided to ask for Novolin N as patient is on Humulin N and paying $120 per month. Also patient mentioned that pulmonology stopped lisinopril. Thought maybe it was the causing breathing problems. Patient did states that the cough was gone. Although she was treated with prednisone and also started on montelukast. Concern with stopping lisinopril that we may loose the renal protective effect? ??   I did not see a recent micro-albumin. Neeta Uriostegui, Pharm. D., [address] Medication Management Service [phone] 12/5/2022 6:09 PM

## 2022-12-21 ENCOUNTER — TELEPHONE (OUTPATIENT)
Dept: PHARMACY | Facility: CLINIC | Age: 67
End: 2022-12-21

## 2022-12-22 NOTE — TELEPHONE ENCOUNTER
[facility] CLINICAL PHARMACY REVIEW: ADHERENCE  Identified care gap per [employer]: fills at [facility]: ACE/ARB, Diabetes, and Statin adherence (new to plan)    Last Visit: 2022 with pharmacist, 10/21/2022 with PCP    [address] [place] [address] Records claims through 2022 (LISSETTE Amos Posadaandra = Filled only once; Potential Fail Date: 2022): Lisinopril 2.5mg x2 tabs daily last filled on 2022 for 90 day supply. Next refill due: 2022    Per [employer] Portal: same as above    Last Rx sent on 2022 for 90ds with 3 refills    BP Readings from Last 3 Encounters:   10/21/22 120/70   22 132/82   22 123/63     Lab Results   Component Value Date    CREATININE 0.68 10/21/2022     Estimated Creatinine Clearance: 95 mL/min (based on SCr of 0.68 mg/dL). Lab Results   Component Value Date/Time    LABGLOM >60 10/21/2022 02:12 PM     DIABETES ADHERENCE    Insurance Records claims through 2022 (LISSETTE Shafer = Filled only once; Potential Fail Date: 2022): Metformin ER 500mg x2 tabs BID last filled on 2022 for 90 day supply. Next refill due: 2022    Per [employer] Portal: same as above    Last metformin ER Rx sent on 2021 for 90ds with 3 refills - Rx     Per Reconciled Dispense Report as of 2022:  Ozempic (injectable semaglutide) 1mg weekly for 84ds on 2022   Humulin N (insulin NPH) 60 units daily for 90ds on 2022    Lab Results   Component Value Date    LABA1C 6.3 2022    LABA1C 5.9 2022    LABA1C 7.2 2021     NOTE A1c <9%    [id number] [address] Records claims through 2022 (LISSETTE [place] = Filled only once; Potential Fail Date: 2022): Atorvastatin 80mg daily last filled on 2022 for 90 day supply.  Next refill due: 2022    Per [employer] Portal: same as above    Last Rx sent on 2022 for 90ds with 3 refills    Lab Results   Component Value Date    CHOL 111 2021    TRIG 101 2021 HDL 38 09/09/2021    LDLCALC 55 09/09/2021     ALT   Date Value Ref Range Status   10/21/2022 12 5 - 33 U/L Final     AST   Date Value Ref Range Status   10/21/2022 18 <32 U/L Final     The ASCVD Risk score (Airam PENA, et al., 2019) failed to calculate for the following reasons: The valid total cholesterol range is 130 to 320 mg/dL     PLAN  The following are interventions that have been identified:   - Patient overdue refilling lisinopril, metformin ER, atorvastatin and active on home medication list.   - Patient needs refills for metformin ER    Per 12/05/2022 pharmacist note, patient stopped lisinopril as advised by pulmonology. Also noted that patient gets Ozempic from [facility]. Patient may be getting Humulin N OTC as BG controlled but no recent fills per insurance portal and Reconciled Dispensed Report. Metformin ER dose was decreased to 500mg x2 tabs daily on 12/02/2021 as patient often missed evening doses and did not want to take all 4 tabs in the morning. When dose was changed, Epic medication list was updated. A new Rx was not sent to patient's pharmacy so they have continued to fill Rx with old dosing, which aligns with the last 3 fills for 360 tabs lasting 6 months for patient. Therefore, she is actually not overdue for a refill/needing a refill at this time. Dispensed Days Supply Quantity Provider Pharmacy   METFORMIN HYDROCHLORIDE ER  500 MG TB24 09/08/2022 90 360 tablet [facility]   METFORMIN HYDROCHLORIDE ER  500 MG TB24 03/03/2022 90 360 tablet [facility]... METFORMIN HYDROCHLORIDE ER  500 MG TB24 09/22/2021 90 360 tablet [facility]      The only medication of the above listed that may be overdue to fill is atorvastatin.  As she follows with pharmacist within medical office, will share this note with Rudy Covarrubias PharmD given her relationship with patient - more likely to have better insight to patient's adherence, next appt is less than a month away.      Future Appointments   Date Time Provider Urvashi Galarza   1/12/2023  1:00 PM Wilder De Anda, [facility]   3/2/2023  3:40 PM DO RUEL Mckeon, PharmD, [facility], [address]   Department, toll free: [phone], option 1

## 2022-12-30 NOTE — TELEPHONE ENCOUNTER
[facility] CLINICAL PHARMACY REVIEW: ADHERENCE    Per [facility] portal, patient did fill atorvastatin on 2022. Patient now appears adherent. As last metformin Rx has now  and the Epic med list has correct dose, next Rx sent to pharmacy should be correct.      Xochitl Zhu, PharmD, BCACP, [facility]  Department, toll free: [phone], option 1    =======================================================    For Pharmacy Admin Tracking Only  Program: [address] in place:  No  Gap Closed?: Yes   Time Spent (min): 30

## 2023-01-05 ENCOUNTER — TELEPHONE (OUTPATIENT)
Dept: FAMILY MEDICINE CLINIC | Age: 68
End: 2023-01-05

## 2023-01-05 DIAGNOSIS — Z79.899 MEDICATION MANAGEMENT: Primary | ICD-10-CM

## 2023-01-05 PROCEDURE — APPNB45 APP NON BILLABLE 31-45 MINUTES: Performed by: PHARMACIST

## 2023-01-05 RX ORDER — INSULIN HUMAN 100 [IU]/ML
INJECTION, SUSPENSION SUBCUTANEOUS
Qty: 90 ML | Refills: 1 | Status: SHIPPED | OUTPATIENT
Start: 2023-01-05

## 2023-01-05 NOTE — TELEPHONE ENCOUNTER
Medication Management Service ([address] [place] [address])  [facility]  [phone]     CLINICAL PHARMACY NOTE:      Spoke with patient by telephone. Patient is running low on NPH insulin. Application for Novolin N and Ozempic had been faxed to [facility] [employer] patient assistance program on 12/08/2022. Patient has been approved for the program through Nov 30, 2023. Message says shipment is in process but no tracking number available to for either medications. Company only ships medication on Monday, Tuesday, and Wednesday. Would appear that office will not receive any medications this week. Additionally the message also says it could take up to 30 days from approval before medication is received. Patient is currently taking NPH 60 units daily. Will send new Rx to patient's mail order pharmacy and see what the cost will be. Patient would like it to be a 90 Rx due to lower costs. Additionally suggested that NPH can be purchased at [facility] for around $25 per vial.   Each vial contains 1000 units so 1 vial would last about 16 days. Rx sent to the pharmacy ([facility]). Meds Mgmt follow up on 01/12/2023 as planned. Catherine Minor, Pharm. D., [address] [facility] Medication Management Service  [phone]  1/5/2023  1:00 PM      =======================================================  For Pharmacy Admin Tracking Only    Program: Medical Group  CPA in place:  Yes  Recommendation Provided To: Patient/Caregiver: 1 via Telephone  Intervention Detail: Refill(s) Provided  Intervention Accepted By: Patient/Caregiver: 1  Time Spent (min): 45

## 2023-01-12 ENCOUNTER — PHARMACY VISIT (OUTPATIENT)
Dept: FAMILY MEDICINE CLINIC | Age: 68
End: 2023-01-12

## 2023-01-12 DIAGNOSIS — Z79.899 MEDICATION MANAGEMENT: Primary | ICD-10-CM

## 2023-01-12 NOTE — PROGRESS NOTES
Medication Management Service  [facility]  [phone]      Gabbi Rivas is a 74 y.o. female that presents for a follow-up diabetes mellitus telephone visit with Medication Management Service per referral from Dr. Bryan Angeles DO for Diabetes Management under Collaborative Practice Agreement with A1c goal < 7 %. Subjective/Objective     New Problems/Changes:  HgbA1c last reported as 6.3% on 8/29/2022. DIET    Patient reported that her diet has continued to improve and she has had less \"cheat\" meals as of late. Most days patient reports not be being hungry. Will eat breakfast and lunch. May not eat dinner. Will have a snack or two during the day. Salads, meat and potatoes, casseroles, etc.  Likes the frozen cokes on occasion. No changes noted. Reports having veggies with Kisw[place]ili dressing recently. Also cheese tortilla with salsa. Blood glucose reading was elevated this morning at 169mg/dL. EXERCISE    Patient continues to remain active with day to day activities around the house. 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 Still active. No changes. WEIGHT  87 kg on 12/05/2022  BMI: 29.16 kg/m2     DIABETES MANAGEMENT    Diabetes Goals: Using ADA Standards of Care     Goal A1c:  Less than 7 %   Fasting Blood Sugars:   mg/dL  Postprandial glucose:  Less than 180 mg/dL     Lab Results   Component Value Date    LABA1C 6.3 08/29/2022    LABA1C 5.9 02/21/2022    LABA1C 7.2 09/09/2021     Current DM Medications    Medication Sig Comments    insulin NPH (HUMULIN N KWIKPEN) 100 UNIT/ML injection pen INJECT SUBCUTANEOUSLY 60 UNITS IN THE MORNING. Taking as prescribed. Although patient went 4-5 days without any NPH. Now has insulin and it was restarted 3 days ago.  Patient will be receiving the vials through the [employer] patient assistance program. Anticipating first shipment by the beginning of February. metFORMIN (GLUCOPHAGE-XR) 500 MG extended release tablet Take 2 tablets by mouth daily (with breakfast) Taking as prescribed. Semaglutide, 1 MG/DOSE, (OZEMPIC, 1 MG/DOSE,) 4 MG/3ML SOPN Inject 1 mg into the skin every 7 days Taking on Mondays. Denies any GI side effects. Home Glucose Readings:    Date FBG   1/10/2023 149   1/11/2023 132   1/12/2023 169   Average 150     Hypoglycemia:   No reports of hypoglycemia. Renal monitoring:    Lab Results   Component Value Date/Time    MICROALBUR 10 03/29/2021 04:29 PM     CrCl cannot be calculated (Unknown ideal weight.). BLOOD PRESSURE MANAGEMENT    BP Readings from Last 3 Encounters:   10/21/22 120/70   08/29/22 132/82   07/31/22 123/63     CHOLESTEROL MANAGEMENT    Lab Results   Component Value Date    CHOL 111 09/09/2021    TRIG 101 09/09/2021    HDL 38 09/09/2021    LDLCALC 55 09/09/2021     ALT   Date Value Ref Range Status   10/21/2022 12 5 - 33 U/L Final     AST   Date Value Ref Range Status   10/21/2022 18 <32 U/L Final     The ASCVD Risk score (Airam DK, et al., 2019) failed to calculate for the following reasons: The valid total cholesterol range is 130 to 320 mg/dL     MEDICATIONS    New/Discontinued medications since last encounter:   None noted    Current Outpatient Medications   Medication Sig Dispense Refill    insulin NPH (HUMULIN N KWIKPEN) 100 UNIT/ML injection pen INJECT SUBCUTANEOUSLY 60 UNITS IN THE MORNING.  90 mL 1    montelukast (SINGULAIR) 10 MG tablet Take 10 mg by mouth nightly      fenofibrate (TRIGLIDE) 160 MG tablet TAKE 1 TABLET BY MOUTH  DAILY 90 tablet 3    atorvastatin (LIPITOR) 80 MG tablet TAKE 1 TABLET BY MOUTH  DAILY 90 tablet 3    metoprolol tartrate (LOPRESSOR) 25 MG tablet TAKE 1 TABLET BY MOUTH  DAILY 90 tablet 3    albuterol sulfate HFA (PROVENTIL HFA) 108 (90 Base) MCG/ACT inhaler Inhale 2 puffs into the lungs every 6 hours as needed for Wheezing 18 g 3    Insulin Pen Needle 31G X 5 MM MISC Use 1 pen needle for daily insulin injection 100 each 3    aspirin (ASPIRIN LOW DOSE) 81 MG EC tablet TAKE 1 TABLET BY MOUTH ONCE DAILY 90 tablet 1    blood glucose test strips (CONTOUR NEXT TEST) strip USE TO TEST TWICE DAILY AND AS NEEDED FOR SYMPTOMS OF  IRREGULAR BLOOD GLUCOSE 300 strip 3    metFORMIN (GLUCOPHAGE-XR) 500 MG extended release tablet Take 2 tablets by mouth daily (with breakfast) 180 tablet 3    Semaglutide, 1 MG/DOSE, (OZEMPIC, 1 MG/DOSE,) 4 MG/3ML SOPN Inject 1 mg into the skin every 7 days 9 mL 1     No current facility-administered medications for this visit. On Statin: Yes    On ACE-I or ARB for HTN or Microalbuminuria: Yes    Medication Adherence/Cost/Adverse Events:   Approval received for the [address] [place] [address] patient assistance program for [address], Novolin N, and pen needles. Approval letter received by patient in the mail. Date of letter was 01/05/2023 with anticipated shipment in 10-14 business days. Although with reported shipment delays it could be up to 30 days. Patient shared that she will have enough medications until shipment is received. Assessment/Plan     Diabetes Management:   Fasting blood glucose readings provided by patient were slightly above goal for the 3. Average was 150mg/dL. Patient feels like readings are starting to come down some since re-starting the NPH insulin. Plan for patient to continue with Ozempic 1 mg weekly, 60 units of NPH every morning, and metformin  mg tablets 2 tablets daily with breakfast.     Telephone call scheduled for [email or web address] to evaluate blood glucose readings and current DM medications. Will also follow up regarding shipment of medications. Anticipating that patient will have received a shipment based on approval letter. Patient will continue to check blood glucose at least twice daily or for signs/symptoms of hypoglycemia.   Patient will continue to work on diet management, consistent exercise/activity, and adherence with prescribed medications. Future Consideration:  SGLT2i if need identified for renal protective effect since stopping lisinopril-although patient may make too much and not qualify. Will need to verify income eligibility if need arises. Will not increase Ozempic to 2mg weekly at this time. Blood glucose readings appear to be at goal.  Patient is tolerating therapy well. With increase to 2mg weekly, would not anticipate being able to stop NPH. Might be closer if an SGT2i is started in conjuction? Next Follow-Up:    Next PCP appointment scheduled for 03/02/2023   Meds Mgmt Telephone office visit on [email or web address]     Progress note sent to referring provider. (Dr Sammi Smith)     Patient verbalized understanding of care plan. Patient advised to call Medication Management with any questions, concerns, or changes prior to next appointment. Patient acknowledges working in a consult agreement with the pharmacist as referred by his/her physician. Laurie Bergman, Pharm. D., [address] [facility] Medication Management Service  [phone]  1/12/2023  1:10 PM      ==================================================================  For Pharmacy Admin Tracking Only    Program: Medical Group  CPA in place:  Yes  Recommendation Provided To: Patient/Caregiver: 1 via Telephone  Intervention Detail: Scheduled Appointment  Time Spent (min): 30

## 2023-01-12 NOTE — Clinical Note
Spoke with patient. Patient had been without NPH. Restarted 3 days ago. Average fasting blood glucose for the last 3 days was 150mg/dL. Will not make any changes today and continue to monitor. Telephone follow up in about 3 weeks.   Of note, patient has been approve

## 2023-01-12 NOTE — Clinical Note
Felix I was interrupted and Epic closed. Patient was approved for patient assistance for 2023 with [employer] and will be receiving Ozempic and NPH. Next PCP appointment scheduled for 03/02/2023. Crystal Castellanos, Pharm. D., [address] [facility] Medication Management Service [phone] 1/13/2023 5:36 PM

## 2023-01-26 ENCOUNTER — TELEPHONE (OUTPATIENT)
Dept: FAMILY MEDICINE CLINIC | Age: 68
End: 2023-01-26

## 2023-01-26 NOTE — TELEPHONE ENCOUNTER
Patient called stating that she received a letter for novonortis stating that they are missing info. She is asking that you please call her.

## 2023-01-27 ENCOUNTER — TELEPHONE (OUTPATIENT)
Dept: FAMILY MEDICINE CLINIC | Age: 68
End: 2023-01-27

## 2023-01-27 NOTE — TELEPHONE ENCOUNTER
Medication Management Service ([address] [place] [address])  [facility]  [phone]     CLINICAL PHARMACY NOTE:      Message received from patient regarding her [address] Patient Assistance application (additional information is needed). Phone call to [address] [place] [address]. Application has been approved as of 01/04/2023. Patient's enrollment is through 12/31/2023. Anticipating that medication (Ozempic and Novolin NPH) will be received in the office within 30 calendar days of enrollment. Patient would be eligible for a voucher if patient would be out of medication before the shipment is received. Phone call to patient. Above information shared. Patient reports having enough medication at this time and will not need a voucher. If patient would need the voucher, a phone call would need to be made to [address] [place] [address] to request it. Follow up as planned with Medication Management on 02/09/2023 for review of blood glucose and confirm if patient has received her medication shipment. Rob Romo, Pharm. D., [address] [facility] Medication Management Service  [phone]  1/27/2023  11:59 AM      =====================================================  For Pharmacy Admin Tracking Only    Program: Medical Group  CPA in place:  Yes  Recommendation Provided To: Patient/Caregiver: 1 via Telephone  Intervention Detail: Patient Access Assistance/Sample Provided  Intervention Accepted By: Patient/Caregiver: 1  Time Spent (min): 30

## 2023-02-09 ENCOUNTER — PHARMACY VISIT (OUTPATIENT)
Dept: FAMILY MEDICINE CLINIC | Age: 68
End: 2023-02-09

## 2023-02-09 DIAGNOSIS — Z79.899 MEDICATION MANAGEMENT: Primary | ICD-10-CM

## 2023-02-09 PROCEDURE — 99999 PR OFFICE/OUTPT VISIT,PROCEDURE ONLY: CPT | Performed by: PHARMACIST

## 2023-02-09 PROCEDURE — APPNB30 APP NON BILLABLE TIME 0-30 MINS: Performed by: PHARMACIST

## 2023-02-09 NOTE — PROGRESS NOTES
Medication Management Service  [facility]  [phone]      Arnol Crowder is a 74 y.o. female that presents for a follow-up diabetes mellitus telephone visit with Medication Management Service per referral from Dr. Gertrudis Hernandez DO for Diabetes Management under Collaborative Practice Agreement with A1c goal < 7 %. Subjective/Objective     New Problems/Changes:  HgbA1c last reported as 6.3% on 8/29/2022. DIET  No significant changes since last visit. 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 Patient will also have \"pickle breaks\" when feeling hungry especially if unable to eat lunch until 2 pm. Fasting blood glucose reading was controlled this morning at 125mg/dL. Per last visit, patient reported that her diet has continued to improve and she has had less \"cheat\" meals as of late. Most days patient reports no being hungry. Will eat breakfast and lunch. May not eat dinner. Will have a snack or two during the day. Salads, meat and potatoes, casseroles, etc.  Likes the frozen cokes on occasion. No changes noted. Reports having veggies with Lao dressing recently. Also cheese tortilla with salsa. EXERCISE  Recently started seasonal job at work which causes her to be on the floor and her feet all day. Patient states jobs allows for walking on a daily basis. This seasonal job will last until May of this year.      WEIGHT  87 kg on 12/05/2022  BMI: 29.16 kg/m2     DIABETES MANAGEMENT    Diabetes Goals: Using ADA Standards of Care     Goal A1c:  Less than 7 %   Fasting Blood Sugars:   mg/dL  Postprandial glucose:  Less than 180 mg/dL     Lab Results   Component Value Date    LABA1C 6.3 08/29/2022    LABA1C 5.9 02/21/2022    LABA1C 7.2 09/09/2021     Current DM Medications    Medication Sig Comments    insulin NPH (HUMULIN N KWIKPEN) 100 UNIT/ML injection pen INJECT SUBCUTANEOUSLY 60 UNITS IN THE MORNING. Taking as prescribed. Although patient went 4-5 days without any NPH. Now has insulin and it was restarted 3 days ago. Patient will be receiving the vials through the [facility] patient assistance program.  Anticipating first shipment by the beginning of February. metFORMIN (GLUCOPHAGE-XR) 500 MG extended release tablet Take 2 tablets by mouth daily (with breakfast) Taking as prescribed. Semaglutide, 1 MG/DOSE, (OZEMPIC, 1 MG/DOSE,) 4 MG/3ML SOPN Inject 1 mg into the skin every 7 days Taking on Mondays. Denies any GI side effects. Home Glucose Readings:  *highest fasting BG value since last visit was 149  Date FBG   02/08/23 119   02/09/23 125   Average 122     Hypoglycemia:   No reports of hypoglycemia. Renal monitoring:    Lab Results   Component Value Date/Time    MICROALBUR 10 03/29/2021 04:29 PM     CrCl cannot be calculated (Unknown ideal weight.). BLOOD PRESSURE MANAGEMENT    BP Readings from Last 3 Encounters:   10/21/22 120/70   08/29/22 132/82   07/31/22 123/63     CHOLESTEROL MANAGEMENT    Lab Results   Component Value Date    CHOL 111 09/09/2021    TRIG 101 09/09/2021    HDL 38 09/09/2021    LDLCALC 55 09/09/2021     ALT   Date Value Ref Range Status   10/21/2022 12 5 - 33 U/L Final     AST   Date Value Ref Range Status   10/21/2022 18 <32 U/L Final     The ASCVD Risk score (Airam DK, et al., 2019) failed to calculate for the following reasons: The valid total cholesterol range is 130 to 320 mg/dL     MEDICATIONS    New/Discontinued medications since last encounter:   None noted    Current Outpatient Medications   Medication Sig Dispense Refill    insulin NPH (HUMULIN N KWIKPEN) 100 UNIT/ML injection pen INJECT SUBCUTANEOUSLY 60 UNITS IN THE MORNING.  90 mL 1    montelukast (SINGULAIR) 10 MG tablet Take 10 mg by mouth nightly      fenofibrate (TRIGLIDE) 160 MG tablet TAKE 1 TABLET BY MOUTH  DAILY 90 tablet 3    atorvastatin (LIPITOR) 80 MG tablet TAKE 1 TABLET BY MOUTH  DAILY 90 tablet 3    metoprolol tartrate (LOPRESSOR) 25 MG tablet TAKE 1 TABLET BY MOUTH  DAILY 90 tablet 3    albuterol sulfate HFA (PROVENTIL HFA) 108 (90 Base) MCG/ACT inhaler Inhale 2 puffs into the lungs every 6 hours as needed for Wheezing 18 g 3    Insulin Pen Needle 31G X 5 MM MISC Use 1 pen needle for daily insulin injection 100 each 3    aspirin (ASPIRIN LOW DOSE) 81 MG EC tablet TAKE 1 TABLET BY MOUTH ONCE DAILY 90 tablet 1    blood glucose test strips (CONTOUR NEXT TEST) strip USE TO TEST TWICE DAILY AND AS NEEDED FOR SYMPTOMS OF  IRREGULAR BLOOD GLUCOSE 300 strip 3    metFORMIN (GLUCOPHAGE-XR) 500 MG extended release tablet Take 2 tablets by mouth daily (with breakfast) 180 tablet 3    Semaglutide, 1 MG/DOSE, (OZEMPIC, 1 MG/DOSE,) 4 MG/3ML SOPN Inject 1 mg into the skin every 7 days 9 mL 1     No current facility-administered medications for this visit. On Statin: Yes    On ACE-I or ARB for HTN or Microalbuminuria: Yes    Medication Adherence/Cost/Adverse Events:   Approval received for the [address] [place] [address] patient assistance program for Panchito Reinoso, Novolin N, and pen needles. Approval letter received by patient in the mail. Patient should receive next shipment possibly end of month (February). Patient shared that she will have enough medications until shipment is received. Assessment/Plan     Diabetes Management:   Fasting blood glucose readings provided by patient were within goal except for highest blood glucose was 149. Average was 122mg/dL. Patient feels like readings are continuing to improve with the NPH insulin. Last A1c was on 08/29/2022. Due for a 6 month repeat A1c towards end of month/beginning of March. Next PCP appointment scheduled for 03/02/2023.     Plan for patient to continue with Ozempic 1 mg weekly, 60 units of NPH every morning, and metformin  mg tablets 2 tablets daily with breakfast.     Telephone call scheduled for [email or web address] to evaluate blood glucose readings and current DM medications. Will also follow up regarding shipment of medications. Patient will continue to check blood glucose at least twice daily or for signs/symptoms of hypoglycemia. Patient will continue to work on diet management, consistent exercise/activity, and adherence with prescribed medications. Future Consideration:  SGLT2i if need identified for renal protective effect since stopping lisinopril-although patient may make too much and not qualify. Will need to verify income eligibility if need arises. Will not increase Ozempic to 2mg weekly at this time. Blood glucose readings appear to be at goal.  Patient is tolerating therapy well. With increase to 2mg weekly, would not anticipate being able to stop NPH. Might be closer if an SGT2i is started in conjuction? Next Follow-Up:    Next PCP appointment scheduled for 03/02/2023   Meds Mgmt Telephone office visit on [email or web address]     Progress note sent to referring provider. (Dr Gautam Thorpe)     Patient verbalized understanding of care plan. Patient advised to call Medication Management with any questions, concerns, or changes prior to next appointment. Patient acknowledges working in a consult agreement with the pharmacist as referred by his/her physician. Linette Melo, PharmD  PGY1 Pharmacy Resident  [facility]) Medication Management Service  [phone]  2/9/2023 9:45 AM    I have discussed the care of this patient with the resident. I agree with the assessment and plan as documented by the resident. Valerie Baker, Pharm. D., [address] [facility] Medication Management Service  [phone]  2/15/2023  5:36 PM      ==================================================================  For Pharmacy Admin Tracking Only    Program: Medication Management  CPA in place:  Yes  Recommendation Provided To: Patient/Caregiver: 1 via Telephone  Intervention Detail: Scheduled Appointment  Time Spent (min): 30

## 2023-02-09 NOTE — Clinical Note
Spoke with patient. No changes in DM medications. Reported blood glucose readings appear at goal.  PCP follow up on 03/02/2023. Med Mgmt follow on 03/30/2023. Roseline Rdz, Pharm. D., [address] Medication Management Service [phone] 2/15/2023 5:37 PM

## 2023-03-02 ENCOUNTER — OFFICE VISIT (OUTPATIENT)
Dept: FAMILY MEDICINE CLINIC | Age: 68
End: 2023-03-02
Payer: COMMERCIAL

## 2023-03-02 VITALS
OXYGEN SATURATION: 96 % | TEMPERATURE: 96.9 F | WEIGHT: 202.8 LBS | SYSTOLIC BLOOD PRESSURE: 120 MMHG | BODY MASS INDEX: 30.84 KG/M2 | HEART RATE: 94 BPM | DIASTOLIC BLOOD PRESSURE: 70 MMHG

## 2023-03-02 DIAGNOSIS — I25.10 ASHD (ARTERIOSCLEROTIC HEART DISEASE): ICD-10-CM

## 2023-03-02 DIAGNOSIS — E11.9 TYPE 2 DIABETES MELLITUS WITHOUT COMPLICATION, UNSPECIFIED WHETHER LONG TERM INSULIN USE (HCC): Primary | ICD-10-CM

## 2023-03-02 DIAGNOSIS — E78.5 DYSLIPIDEMIA: ICD-10-CM

## 2023-03-02 DIAGNOSIS — R21 RASH: ICD-10-CM

## 2023-03-02 DIAGNOSIS — J98.01 BRONCHOSPASM: ICD-10-CM

## 2023-03-02 LAB — HBA1C MFR BLD: 6.4 %

## 2023-03-02 PROCEDURE — 3017F COLORECTAL CA SCREEN DOC REV: CPT | Performed by: FAMILY MEDICINE

## 2023-03-02 PROCEDURE — G8484 FLU IMMUNIZE NO ADMIN: HCPCS | Performed by: FAMILY MEDICINE

## 2023-03-02 PROCEDURE — G8400 PT W/DXA NO RESULTS DOC: HCPCS | Performed by: FAMILY MEDICINE

## 2023-03-02 PROCEDURE — 1123F ACP DISCUSS/DSCN MKR DOCD: CPT | Performed by: FAMILY MEDICINE

## 2023-03-02 PROCEDURE — 2022F DILAT RTA XM EVC RTNOPTHY: CPT | Performed by: FAMILY MEDICINE

## 2023-03-02 PROCEDURE — G8427 DOCREV CUR MEDS BY ELIG CLIN: HCPCS | Performed by: FAMILY MEDICINE

## 2023-03-02 PROCEDURE — 3044F HG A1C LEVEL LT 7.0%: CPT | Performed by: FAMILY MEDICINE

## 2023-03-02 PROCEDURE — 1090F PRES/ABSN URINE INCON ASSESS: CPT | Performed by: FAMILY MEDICINE

## 2023-03-02 PROCEDURE — 1036F TOBACCO NON-USER: CPT | Performed by: FAMILY MEDICINE

## 2023-03-02 PROCEDURE — 83036 HEMOGLOBIN GLYCOSYLATED A1C: CPT | Performed by: FAMILY MEDICINE

## 2023-03-02 PROCEDURE — G8417 CALC BMI ABV UP PARAM F/U: HCPCS | Performed by: FAMILY MEDICINE

## 2023-03-02 PROCEDURE — 99214 OFFICE O/P EST MOD 30 MIN: CPT | Performed by: FAMILY MEDICINE

## 2023-03-02 RX ORDER — CLOTRIMAZOLE AND BETAMETHASONE DIPROPIONATE 10; .64 MG/G; MG/G
CREAM TOPICAL
Qty: 45 G | Refills: 0 | Status: SHIPPED | OUTPATIENT
Start: 2023-03-02

## 2023-03-02 RX ORDER — FENOFIBRATE 160 MG/1
160 TABLET ORAL DAILY
Qty: 90 TABLET | Refills: 3 | Status: SHIPPED | OUTPATIENT
Start: 2023-03-02

## 2023-03-02 RX ORDER — CLOTRIMAZOLE AND BETAMETHASONE DIPROPIONATE 10; .64 MG/G; MG/G
CREAM TOPICAL
Qty: 45 G | Refills: 0 | Status: SHIPPED | OUTPATIENT
Start: 2023-03-02 | End: 2023-03-02

## 2023-03-02 RX ORDER — ATORVASTATIN CALCIUM 80 MG/1
80 TABLET, FILM COATED ORAL DAILY
Qty: 90 TABLET | Refills: 3 | Status: SHIPPED | OUTPATIENT
Start: 2023-03-02

## 2023-03-02 RX ORDER — METFORMIN HYDROCHLORIDE 500 MG/1
1000 TABLET, EXTENDED RELEASE ORAL
Qty: 180 TABLET | Refills: 3 | Status: SHIPPED | OUTPATIENT
Start: 2023-03-02

## 2023-03-02 SDOH — ECONOMIC STABILITY: INCOME INSECURITY: HOW HARD IS IT FOR YOU TO PAY FOR THE VERY BASICS LIKE FOOD, HOUSING, MEDICAL CARE, AND HEATING?: NOT HARD AT ALL

## 2023-03-02 SDOH — ECONOMIC STABILITY: FOOD INSECURITY: WITHIN THE PAST 12 MONTHS, YOU WORRIED THAT YOUR FOOD WOULD RUN OUT BEFORE YOU GOT MONEY TO BUY MORE.: NEVER TRUE

## 2023-03-02 SDOH — ECONOMIC STABILITY: FOOD INSECURITY: WITHIN THE PAST 12 MONTHS, THE FOOD YOU BOUGHT JUST DIDN'T LAST AND YOU DIDN'T HAVE MONEY TO GET MORE.: NEVER TRUE

## 2023-03-02 SDOH — ECONOMIC STABILITY: HOUSING INSECURITY
IN THE LAST 12 MONTHS, WAS THERE A TIME WHEN YOU DID NOT HAVE A STEADY PLACE TO SLEEP OR SLEPT IN A SHELTER (INCLUDING NOW)?: NO

## 2023-03-02 ASSESSMENT — PATIENT HEALTH QUESTIONNAIRE - PHQ9
2. FEELING DOWN, DEPRESSED OR HOPELESS: 0
1. LITTLE INTEREST OR PLEASURE IN DOING THINGS: 0
SUM OF ALL RESPONSES TO PHQ QUESTIONS 1-9: 0
SUM OF ALL RESPONSES TO PHQ9 QUESTIONS 1 & 2: 0
SUM OF ALL RESPONSES TO PHQ QUESTIONS 1-9: 0

## 2023-03-02 NOTE — PATIENT INSTRUCTIONS
Patient Education        Counting Carbohydrates for Diabetes: Care Instructions  Overview     Managing the amount of carbohydrate (carbs) you eat is an important part of planning healthy meals when you have diabetes. Carbs raise blood sugar more than any other nutrient. Carbs are found in grains, starchy vegetables, fruits, and milk and yogurt. Carbs are also found in sugar-sweetened foods and drinks. The more carbs you eat at one time, the higher your blood sugar will rise. Counting carbs can help you keep your blood sugar within your target range. If you use insulin, counting carbs helps you match the right amount of insulin to the number of grams of carbs in a meal.  A registered dietitian or diabetes educator can help you plan meals and snacks. Follow-up care is a key part of your treatment and safety. Be sure to make and go to all appointments, and call your doctor if you are having problems. It's also a good idea to know your test results and keep a list of the medicines you take. How can you care for yourself at home? Know your daily amount of carbohydrates  Your daily amount depends on several things, such as your weight, how active you are, which diabetes medicines you take, and what your goals are for your blood sugar levels. A registered dietitian or diabetes educator can help you plan how many carbs to include in each meal and snack. For most adults, a guideline for the daily amount of carbs is:  45 to 60 grams at each meal. That's about the same as 3 to 4 carbohydrate servings. 15 to 20 grams at each snack. That's about the same as 1 carbohydrate serving. Count carbs  Counting carbs lets you know how much rapid-acting insulin to take before you eat. If you use an insulin pump, you get a constant rate of insulin during the day. So the pump must be programmed at meals. This gives you extra insulin to cover the rise in blood sugar after meals.   If you take insulin:  Learn your own insulin-to-carb ratio. You and your diabetes health professional will figure out the ratio. You can do this by testing your blood sugar after meals. For example, you may need a certain amount of insulin for every 15 grams of carbs. Add up the carb grams in a meal. Then you can figure out how many units of insulin to take based on your insulin-to-carb ratio. Exercise lowers blood sugar. You can use less insulin than you would if you were not doing exercise. Keep in mind that timing matters. If you exercise within 1 hour after a meal, your body may need less insulin for that meal than it would if you exercised 3 hours after the meal. Test your blood sugar to find out how exercise affects your need for insulin. If you do or don't take insulin:  Look at labels on packaged foods. This can tell you how many carbs are in a serving. Be aware of portions, or serving sizes. If a package has two servings and you eat the whole package, you need to double the number of grams of carbohydrate listed for one serving. Protein, fat, and fiber do not raise blood sugar as much as carbs do. If you eat a lot of these nutrients in a meal, your blood sugar will rise more slowly than it would otherwise. Where can you learn more? Go to http://www.woods.com/ and enter G703 to learn more about \"Counting Carbohydrates for Diabetes: Care Instructions. \"  Current as of: May 9, 2022               Content Version: 13.5  © 4528-2587 [employer]. Care instructions adapted under license by [facility] ([facility]). If you have questions about a medical condition or this instruction, always ask your healthcare professional. [place] [address] any warranty or liability for your use of this information.

## 2023-03-13 ASSESSMENT — ENCOUNTER SYMPTOMS
GASTROINTESTINAL NEGATIVE: 1
ABDOMINAL PAIN: 0
SHORTNESS OF BREATH: 1
SWOLLEN GLANDS: 0
RHINORRHEA: 0
VISUAL CHANGE: 0
WHEEZING: 0
ALLERGIC/IMMUNOLOGIC NEGATIVE: 1
BLURRED VISION: 0
SPUTUM PRODUCTION: 0
CHEST TIGHTNESS: 0
EYES NEGATIVE: 1
VOMITING: 0
ORTHOPNEA: 0
HEMOPTYSIS: 0
SORE THROAT: 0

## 2023-03-14 NOTE — PROGRESS NOTES
AP[facility] Progress Note    Date:3/2/2023         Patient Sophie Powell     Date of Birth:[birth year]     Age:67 y.o. Assessment/Plan        Problem List Items Addressed This Visit          Circulatory    ASHD (arteriosclerotic heart disease)      At goal, continue current medications and continue current treatment plan         Relevant Medications    metoprolol tartrate (LOPRESSOR) 25 MG tablet    fenofibrate (TRIGLIDE) 160 MG tablet    atorvastatin (LIPITOR) 80 MG tablet    Other Relevant Orders    CBC with Auto Differential       Endocrine    Diabetes mellitus ([address] - Primary      Well-controlled, continue current medications, continue current treatment plan, medication adherence emphasized and lifestyle modifications recommended         Relevant Medications    metFORMIN (GLUCOPHAGE-XR) 500 MG extended release tablet    Other Relevant Orders    POCT glycosylated hemoglobin (Hb A1C) (Completed)    CBC with Auto Differential    Hemoglobin A1C    Microalbumin, Ur       Other    Dyslipidemia      Well-controlled, continue current medications, continue current treatment plan, medication adherence emphasized and lifestyle modifications recommended         Relevant Medications    fenofibrate (TRIGLIDE) 160 MG tablet    atorvastatin (LIPITOR) 80 MG tablet    Other Relevant Orders    CBC with Auto Differential    Comprehensive Metabolic Panel    Lipid Panel     Other Visit Diagnoses       Rash        Relevant Medications    clotrimazole-betamethasone (LOTRISONE) 1-0.05 % cream    Other Relevant Orders    CBC with Auto Differential    Bronchospasm        Relevant Orders    CBC with Auto Differential             Return in about 6 months (around 9/2/2023). Electronically signed by Lawson Cardenas DO on 3/13/23       Total time spent was between Time personally spent assessing and managing the patient on the date of service: Est: 30-39 minutes (16270) mins.  This included time spent reviewing the patient's medical record (e.g., recent visits, labs, and studies); seeing the patient in the office (face-to-face time); ordering medications, studies, procedures, or referrals; calling the patient or family later in the day with results and further recommendations; and documenting the visit in the medical record. Subjective     Jennifer Singletary is a 74 y.o. female presenting today for   Chief Complaint   Patient presents with    Follow-up Chronic Condition    Diabetes   . Diabetes  She presents for her follow-up diabetic visit. She has type 2 diabetes mellitus. Her disease course has been improving. There are no hypoglycemic associated symptoms. Pertinent negatives for hypoglycemia include no dizziness or headaches. There are no diabetic associated symptoms. Pertinent negatives for diabetes include no blurred vision, no chest pain, no fatigue, no foot paresthesias, no foot ulcerations, no polydipsia, no polyphagia, no polyuria, no visual change, no weakness and no weight loss. Symptoms are stable. Current diabetic treatment includes insulin injections and oral agent (monotherapy). She is compliant with treatment most of the time. Her weight is increasing steadily. Her home blood glucose trend is increasing steadily. An ACE inhibitor/angiotensin II receptor blocker is being taken. Shortness of Breath  This is a recurrent problem. The current episode started more than 1 month ago. The problem occurs intermittently. The problem has been waxing and waning. Pertinent negatives include no abdominal pain, chest pain, claudication, coryza, ear pain, fever, headaches, hemoptysis, leg pain, leg swelling, neck pain, orthopnea, PND, rash, rhinorrhea, sore throat, sputum production, swollen glands, syncope, vomiting or wheezing. Her past medical history is significant for CAD. Hyperlipidemia  This is a chronic problem. The current episode started more than 1 year ago. Exacerbating diseases include diabetes, liver disease and obesity. She has no history of chronic renal disease, hypothyroidism or nephrotic syndrome. Associated symptoms include shortness of breath. Pertinent negatives include no chest pain, focal sensory loss, focal weakness, leg pain or myalgias. Current antihyperlipidemic treatment includes statins and fibric acid derivatives. The current treatment provides moderate improvement of lipids. Coronary Artery Disease  Presents for follow-up visit. Symptoms include shortness of breath. Pertinent negatives include no chest pain, chest pressure, chest tightness, dizziness, leg swelling, muscle weakness, palpitations or weight gain. Risk factors include obesity. The symptoms have been stable. Compliance with diet is variable. Compliance with exercise is variable. Compliance with medications is good. Review of Systems   Review of Systems   Constitutional: Negative. Negative for fatigue, fever, weight gain and weight loss. HENT: Negative. Negative for ear pain, rhinorrhea and sore throat. Eyes: Negative. Negative for blurred vision. Respiratory:  Positive for shortness of breath. Negative for hemoptysis, sputum production, chest tightness and wheezing. Cardiovascular: Negative. Negative for chest pain, palpitations, orthopnea, claudication, leg swelling, syncope and PND. Gastrointestinal: Negative. Negative for abdominal pain and vomiting. Endocrine: Negative. Negative for polydipsia, polyphagia and polyuria. Genitourinary: Negative. Musculoskeletal: Negative. Negative for myalgias, muscle weakness and neck pain. Skin: Negative. Negative for rash. Allergic/Immunologic: Negative. Neurological: Negative. Negative for dizziness, focal weakness, weakness and headaches. Hematological: Negative. Psychiatric/Behavioral: Negative. All other systems reviewed and are negative.     Medications     Current Outpatient Medications   Medication Sig Dispense Refill    clotrimazole-betamethasone (LOTRISONE) 1-0.05 % cream Apply topically 2 times daily. 45 g 0    metoprolol tartrate (LOPRESSOR) 25 MG tablet Take 1 tablet by mouth daily 90 tablet 3    metFORMIN (GLUCOPHAGE-XR) 500 MG extended release tablet Take 2 tablets by mouth daily (with breakfast) 180 tablet 3    fenofibrate (TRIGLIDE) 160 MG tablet Take 1 tablet by mouth daily 90 tablet 3    atorvastatin (LIPITOR) 80 MG tablet Take 1 tablet by mouth daily 90 tablet 3    insulin NPH (HUMULIN N KWIKPEN) 100 UNIT/ML injection pen INJECT SUBCUTANEOUSLY 60 UNITS IN THE MORNING. 90 mL 1    montelukast (SINGULAIR) 10 MG tablet Take 10 mg by mouth nightly      albuterol sulfate HFA (PROVENTIL HFA) 108 (90 Base) MCG/ACT inhaler Inhale 2 puffs into the lungs every 6 hours as needed for Wheezing 18 g 3    Insulin Pen Needle 31G X 5 MM MISC Use 1 pen needle for daily insulin injection 100 each 3    aspirin (ASPIRIN LOW DOSE) 81 MG EC tablet TAKE 1 TABLET BY MOUTH ONCE DAILY 90 tablet 1    blood glucose test strips (CONTOUR NEXT TEST) strip USE TO TEST TWICE DAILY AND AS NEEDED FOR SYMPTOMS OF  IRREGULAR BLOOD GLUCOSE 300 strip 3    Semaglutide, 1 MG/DOSE, (OZEMPIC, 1 MG/DOSE,) 4 MG/3ML SOPN Inject 1 mg into the skin every 7 days 9 mL 1     No current facility-administered medications for this visit.       Past History    Past Medical History:   has a past medical history of Diabetes mellitus (HCC), Dyslipidemia, and Urticaria, chronic.    Social History:   reports that she quit smoking about 10 years ago. Her smoking use included cigarettes. She has a 21.00 pack-year smoking history. She has never used smokeless tobacco. She reports that she does not drink alcohol and does not use drugs.     Family History:   Family History   Problem Relation Age of Onset    High Cholesterol Father     High Blood Pressure Father     Heart Disease Father     Diabetes Father     High Cholesterol Sister     High Blood Pressure Sister        Surgical History: History reviewed. No pertinent  surgical history. Physical Examination      Vitals:  /70 (Site: Left Upper Arm, Position: Sitting, Cuff Size: Medium Adult)   Pulse 94   Temp 96.9 °F (36.1 °C) (Temporal)   Wt 202 lb 12.8 oz (92 kg)   SpO2 96%   BMI 30.84 kg/m²     Physical Exam  Vitals and nursing note reviewed. Constitutional:       General: She is not in acute distress. Appearance: Normal appearance. She is obese. She is not ill-appearing, toxic-appearing or diaphoretic. HENT:      Head: Normocephalic and atraumatic. Eyes:      General: No scleral icterus. Right eye: No discharge. Left eye: No discharge. Extraocular Movements: Extraocular movements intact. Conjunctiva/sclera: Conjunctivae normal.   Cardiovascular:      Rate and Rhythm: Normal rate and regular rhythm. Pulses: Normal pulses. Heart sounds: Normal heart sounds. No murmur heard. No friction rub. No gallop. Pulmonary:      Effort: Pulmonary effort is normal. No respiratory distress. Breath sounds: Normal breath sounds. No stridor. No wheezing, rhonchi or rales. Chest:      Chest wall: No tenderness. Neurological:      Mental Status: She is alert and oriented to person, place, and time. Mental status is at baseline. Psychiatric:         Mood and Affect: Mood normal.         Behavior: Behavior normal.         Thought Content: Thought content normal.         Judgment: Judgment normal.       Labs/Imaging/Diagnostics   Labs:  Hemoglobin A1C   Date Value Ref Range Status   03/02/2023 6.4 % Final       Imaging Last 24 Hours:  XR CHEST (2 VW)  Narrative: EXAMINATION:  TWO XRAY VIEWS OF THE CHEST    10/21/2022 1:13 pm    COMPARISON:  03/31/2022. HISTORY:  ORDERING SYSTEM PROVIDED HISTORY: SOBOE (shortness of breath on exertion)  TECHNOLOGIST PROVIDED HISTORY:  See ICDM-10 code attached  Reason for Exam: SOB upon exertion. Chronic cough. FINDINGS:  The heart size is within normal limits.  The pulmonary vasculature is also  within normal limits. No acute infiltrates are seen. The costophrenic angles  are sharp bilaterally. No pneumothoraces are noted. There is scarring within  the lingula. Impression: 1. No active pulmonary disease.

## 2023-03-30 ENCOUNTER — PHARMACY VISIT (OUTPATIENT)
Dept: FAMILY MEDICINE CLINIC | Age: 68
End: 2023-03-30

## 2023-03-30 DIAGNOSIS — Z79.899 MEDICATION MANAGEMENT: Primary | ICD-10-CM

## 2023-03-30 PROCEDURE — 99999 PR OFFICE/OUTPT VISIT,PROCEDURE ONLY: CPT | Performed by: PHARMACIST

## 2023-03-30 PROCEDURE — APPNB30 APP NON BILLABLE TIME 0-30 MINS: Performed by: PHARMACIST

## 2023-03-30 NOTE — Clinical Note
Spoke with patient. Reported blood glucose readings at goal.  Has received shipment of Ozempic and Novolin N from [facility]. Will be transitioning to Novolin N once home supply of Humulin N is used. Telephone follow up in June. Next PCP appt scheduled for 09/05/2023. Karen Maxwell, Pharm. D., [address] Medication Management Service [phone] 3/31/2023 7:55 AM

## 2023-03-31 NOTE — PROGRESS NOTES
Sounds great thank you Alec Miller
lisinopril-although patient may make too much and not qualify. Will need to verify income eligibility if need arises. Increase Ozempic to 2mg weekly at this time. Blood glucose readings appear to be at goal.  Patient is tolerating therapy well. With increase to 2mg weekly, would not anticipate being able to stop NPH. Might be closer if an SGT2i is started in conjuction? The following education was provided during today's visit:     [] Medication adherence   [] Insulin technique and storage   [] Healthy lifestyle including diet and exercise changes   [] Hypoglycemia symptoms and management   [x] Blood glucose goals    [] Introduction to FreeStyle Santino 2 and continuing glucose monitoring   [] Interpreting Ambulatory Glucose Profile (AGP) Report with focus on page 1, average number of scans and glucose levels per day, and snapshot     Next Follow-Up: With med management on 06/29/23 at 9am via telephone to discuss blood glucose log and medication adherence. Next PCP appointment on 9/5/23 at 3:40pm.    Patient verbalized understanding of care plan. Patient advised to call Medication Management with any questions, concerns, or changes prior to next appointment. Progress note sent to referring provider. (Dr Gabbie Monteiro, DO)   Patient acknowledges working in a consult agreement with the pharmacist as referred by his/her physician. Oxana Ventura  2023 PharmD Candidate  [facility]) Medication Management Service  [phone]  3/30/2023  9:56 AM      I have discussed the care of this patient with the student. I agree with the assessment and plan as documented by the student. Antonina Galvez, Pharm. D., [address] [facility] Medication Management Service  [phone]  3/31/2023  7:51 AM    ==================================================================  For Pharmacy Admin Tracking Only    Program: Medical Group  CPA in place:  Yes  Time Spent (min): 30

## 2023-06-29 ENCOUNTER — PHARMACY VISIT (OUTPATIENT)
Dept: FAMILY MEDICINE CLINIC | Age: 68
End: 2023-06-29

## 2023-06-29 DIAGNOSIS — Z79.899 MEDICATION MANAGEMENT: Primary | ICD-10-CM

## 2023-07-11 ENCOUNTER — TELEPHONE (OUTPATIENT)
Dept: FAMILY MEDICINE CLINIC | Age: 68
End: 2023-07-11

## 2023-07-11 DIAGNOSIS — E11.9 TYPE 2 DIABETES MELLITUS WITHOUT COMPLICATION, UNSPECIFIED WHETHER LONG TERM INSULIN USE (HCC): Primary | ICD-10-CM

## 2023-07-11 NOTE — TELEPHONE ENCOUNTER
Patient needs script sent in for U-100 Insulin Syringes  Qty 100 patient missed her am dose she thought she was going to have pre measured pens please call asap!     Thank you    Amelia Hamilton

## 2023-07-12 RX ORDER — INSULIN HUMAN 100 [IU]/ML
INJECTION, SUSPENSION SUBCUTANEOUS
Qty: 90 ML | Refills: 1 | Status: SHIPPED | OUTPATIENT
Start: 2023-07-12

## 2023-07-12 NOTE — TELEPHONE ENCOUNTER
Patient calls in checking status of this script. Is this the same insuline NPH injection pen on med list? If so I can pend the order to get signed.

## 2023-07-13 ENCOUNTER — TELEPHONE (OUTPATIENT)
Dept: FAMILY MEDICINE CLINIC | Age: 68
End: 2023-07-13

## 2023-07-13 DIAGNOSIS — Z79.899 MEDICATION MANAGEMENT: ICD-10-CM

## 2023-07-13 DIAGNOSIS — E11.9 TYPE 2 DIABETES MELLITUS WITHOUT COMPLICATION, UNSPECIFIED WHETHER LONG TERM INSULIN USE (HCC): Primary | ICD-10-CM

## 2023-07-13 RX ORDER — CALCIUM CARB/VITAMIN D3/VIT K1 500-100-40
TABLET,CHEWABLE ORAL
Qty: 100 EACH | Refills: 3 | Status: SHIPPED | OUTPATIENT
Start: 2023-07-13

## 2023-07-13 NOTE — TELEPHONE ENCOUNTER
Yao Tian is calling to request a refill on the following medication(s):    Medication Request:  Requested Prescriptions     Pending Prescriptions Disp Refills    Insulin Syringe-Needle U-100 (INSULIN SYRINGE 1CC/31GX5/16\") 31G X 5/16\" 1 ML MISC 100 each 3     Sig: Use syringe to give insulin daily. Patient currently receives NPH insulin vials from the [facility] patient assistance program.      Last Visit Date (If Applicable):  1/13/7566    Next Visit Date:    10/5/2023      Ed Marni, Pharm. D., [address] [place] [address] Medication Management Service  [phone]  7/13/2023  8:25 AM      =========================================  For Pharmacy Admin Tracking Only    Program: Medical Group  CPA in place:  Yes  Recommendation Provided To: Patient/Caregiver: 1 via Telephone  Intervention Detail: Refill(s) Provided  Intervention Accepted By: Patient/Caregiver: 1  Time Spent (min): 20

## 2023-07-14 NOTE — TELEPHONE ENCOUNTER
Dayday Thornton thank you Winsome Richards.  I'd like to stick with what she's on now since it is working so well

## 2023-07-17 ENCOUNTER — OFFICE VISIT (OUTPATIENT)
Dept: FAMILY MEDICINE CLINIC | Age: 68
End: 2023-07-17
Payer: MEDICARE

## 2023-07-17 VITALS
DIASTOLIC BLOOD PRESSURE: 60 MMHG | RESPIRATION RATE: 18 BRPM | BODY MASS INDEX: 31.89 KG/M2 | OXYGEN SATURATION: 97 % | HEART RATE: 89 BPM | WEIGHT: 210.4 LBS | HEIGHT: 68 IN | SYSTOLIC BLOOD PRESSURE: 118 MMHG

## 2023-07-17 DIAGNOSIS — L23.7 POISON IVY DERMATITIS: Primary | ICD-10-CM

## 2023-07-17 DIAGNOSIS — J30.89 NON-SEASONAL ALLERGIC RHINITIS DUE TO OTHER ALLERGIC TRIGGER: ICD-10-CM

## 2023-07-17 PROCEDURE — 99214 OFFICE O/P EST MOD 30 MIN: CPT | Performed by: NURSE PRACTITIONER

## 2023-07-17 PROCEDURE — 3017F COLORECTAL CA SCREEN DOC REV: CPT | Performed by: NURSE PRACTITIONER

## 2023-07-17 PROCEDURE — 1123F ACP DISCUSS/DSCN MKR DOCD: CPT | Performed by: NURSE PRACTITIONER

## 2023-07-17 PROCEDURE — 1090F PRES/ABSN URINE INCON ASSESS: CPT | Performed by: NURSE PRACTITIONER

## 2023-07-17 PROCEDURE — G8417 CALC BMI ABV UP PARAM F/U: HCPCS | Performed by: NURSE PRACTITIONER

## 2023-07-17 PROCEDURE — G8427 DOCREV CUR MEDS BY ELIG CLIN: HCPCS | Performed by: NURSE PRACTITIONER

## 2023-07-17 PROCEDURE — G8400 PT W/DXA NO RESULTS DOC: HCPCS | Performed by: NURSE PRACTITIONER

## 2023-07-17 PROCEDURE — 1036F TOBACCO NON-USER: CPT | Performed by: NURSE PRACTITIONER

## 2023-07-17 RX ORDER — MOMETASONE FUROATE 1 MG/G
CREAM TOPICAL
Qty: 15 G | Refills: 0 | Status: SHIPPED | OUTPATIENT
Start: 2023-07-17

## 2023-07-17 RX ORDER — LORATADINE 10 MG/1
10 TABLET ORAL DAILY
Qty: 30 TABLET | Refills: 0 | Status: SHIPPED | OUTPATIENT
Start: 2023-07-17 | End: 2023-08-16

## 2023-07-17 ASSESSMENT — ENCOUNTER SYMPTOMS
EYE DISCHARGE: 1
EYE ITCHING: 1

## 2023-07-17 NOTE — PROGRESS NOTES
Ekta Hanna, [address]  [address]. Maria Del Carmen Tracey, [address]  [phone]  [phone]    Junito Sumner is a 79 y.o. female who is here with c/o of:    Chief Complaint: Allergic Reaction (Allergic reaction- face red and puffy, right arm has rash )      Patient Accompanied by: n/a    HPI - Junito Sumner is here today with c/o:    Reports that she was doing yard work last week and noticed this weekend a small rash on her right upper arm. Says that it itches. Has not spread. She also work up this morning with swelling and redness under bilateral eye. Says that for the last several months she has also had watery, itchy eyes. Health Maintenance Due   Topic Date Due    Hepatitis C screen  Never done    Shingles vaccine (1 of 2) Never done    Low dose CT lung screening &/or counseling  Never done    DEXA (modify frequency per FRAX score)  Never done    Diabetic retinal exam  08/18/2016    COVID-19 Vaccine (4 - Booster for Moderna series) 03/02/2022    Diabetic foot exam  03/29/2022    Diabetic Alb to Cr ratio (uACR) test  03/29/2022    Breast cancer screen  07/08/2022    Lipids  09/09/2022    Annual Wellness Visit (AWV)  06/29/2023        Patient Active Problem List:     Dyslipidemia     Diabetes mellitus (HCC)     Urticaria, chronic     ASHD (arteriosclerotic heart disease)     Primary osteoarthritis involving multiple joints     Elevated liver enzymes     Microalbuminuria     Past Medical History:   Diagnosis Date    Diabetes mellitus ([address])     Dyslipidemia     Urticaria, chronic       History reviewed. No pertinent surgical history.   Family History   Problem Relation Age of Onset    High Cholesterol Father     High Blood Pressure Father     Heart Disease Father     Diabetes Father     High Cholesterol Sister     High Blood Pressure Sister      Social History     Tobacco Use    Smoking status: Former     Packs/day: 1.00     Years: 21.00     Pack years: 21.00     Types:

## 2023-07-18 DIAGNOSIS — Z12.31 ENCOUNTER FOR SCREENING MAMMOGRAM FOR BREAST CANCER: Primary | ICD-10-CM

## 2023-08-21 ENCOUNTER — OFFICE VISIT (OUTPATIENT)
Dept: FAMILY MEDICINE CLINIC | Age: 68
End: 2023-08-21
Payer: MEDICARE

## 2023-08-21 ENCOUNTER — HOSPITAL ENCOUNTER (OUTPATIENT)
Dept: MAMMOGRAPHY | Age: 68
Discharge: HOME | End: 2023-08-23
Payer: MEDICARE

## 2023-08-21 VITALS — HEIGHT: 68 IN | BODY MASS INDEX: 31.83 KG/M2 | WEIGHT: 210 LBS

## 2023-08-21 VITALS
TEMPERATURE: 99.8 F | HEART RATE: 104 BPM | OXYGEN SATURATION: 98 % | SYSTOLIC BLOOD PRESSURE: 150 MMHG | DIASTOLIC BLOOD PRESSURE: 70 MMHG

## 2023-08-21 DIAGNOSIS — R06.02 SHORTNESS OF BREATH: ICD-10-CM

## 2023-08-21 DIAGNOSIS — Z12.31 ENCOUNTER FOR SCREENING MAMMOGRAM FOR BREAST CANCER: ICD-10-CM

## 2023-08-21 DIAGNOSIS — J45.21 MILD INTERMITTENT REACTIVE AIRWAY DISEASE WITH ACUTE EXACERBATION: Primary | ICD-10-CM

## 2023-08-21 PROCEDURE — 1090F PRES/ABSN URINE INCON ASSESS: CPT

## 2023-08-21 PROCEDURE — 99214 OFFICE O/P EST MOD 30 MIN: CPT

## 2023-08-21 PROCEDURE — 77063 BREAST TOMOSYNTHESIS BI: CPT

## 2023-08-21 PROCEDURE — G8417 CALC BMI ABV UP PARAM F/U: HCPCS

## 2023-08-21 PROCEDURE — 1036F TOBACCO NON-USER: CPT

## 2023-08-21 PROCEDURE — G8400 PT W/DXA NO RESULTS DOC: HCPCS

## 2023-08-21 PROCEDURE — 1123F ACP DISCUSS/DSCN MKR DOCD: CPT

## 2023-08-21 PROCEDURE — G8427 DOCREV CUR MEDS BY ELIG CLIN: HCPCS

## 2023-08-21 PROCEDURE — 3017F COLORECTAL CA SCREEN DOC REV: CPT

## 2023-08-21 RX ORDER — METHYLPREDNISOLONE 4 MG/1
TABLET ORAL
Qty: 1 KIT | Refills: 0 | Status: SHIPPED | OUTPATIENT
Start: 2023-08-21 | End: 2023-08-27

## 2023-08-21 RX ORDER — ALBUTEROL SULFATE 90 UG/1
2 AEROSOL, METERED RESPIRATORY (INHALATION) EVERY 6 HOURS PRN
Qty: 18 G | Refills: 3 | Status: SHIPPED | OUTPATIENT
Start: 2023-08-21

## 2023-08-21 SDOH — ECONOMIC STABILITY: FOOD INSECURITY: WITHIN THE PAST 12 MONTHS, THE FOOD YOU BOUGHT JUST DIDN'T LAST AND YOU DIDN'T HAVE MONEY TO GET MORE.: NEVER TRUE

## 2023-08-21 SDOH — ECONOMIC STABILITY: FOOD INSECURITY: WITHIN THE PAST 12 MONTHS, YOU WORRIED THAT YOUR FOOD WOULD RUN OUT BEFORE YOU GOT MONEY TO BUY MORE.: NEVER TRUE

## 2023-08-21 SDOH — ECONOMIC STABILITY: INCOME INSECURITY: HOW HARD IS IT FOR YOU TO PAY FOR THE VERY BASICS LIKE FOOD, HOUSING, MEDICAL CARE, AND HEATING?: NOT HARD AT ALL

## 2023-08-21 ASSESSMENT — ENCOUNTER SYMPTOMS
ABDOMINAL PAIN: 0
SORE THROAT: 0
CHEST TIGHTNESS: 0
EYE DISCHARGE: 0
SHORTNESS OF BREATH: 1
CONSTIPATION: 0
DIARRHEA: 0
COUGH: 0
COLOR CHANGE: 0
WHEEZING: 0
VOMITING: 0
NAUSEA: 0

## 2023-08-21 NOTE — PROGRESS NOTES
needed for Wheezing, Disp-18 g, R-3Normal  -     methylPREDNISolone (MEDROL, RADHA,) 4 MG tablet; Take as directed, Disp-1 kit, R-0Normal      Return if symptoms worsen or fail to improve. An electronic signature was used to authenticate this note.     --Guy Roth, JEANNE - CNP

## 2023-08-22 DIAGNOSIS — J30.89 NON-SEASONAL ALLERGIC RHINITIS DUE TO OTHER ALLERGIC TRIGGER: ICD-10-CM

## 2023-08-23 NOTE — TELEPHONE ENCOUNTER
Sami Rubio is calling to request a refill on the following medication(s):    Medication Request:  Requested Prescriptions     Pending Prescriptions Disp Refills    loratadine (CLARITIN) 10 MG tablet [Pharmacy Med Name: LORATADINE 10MG TABLETS] 30 tablet 0     Sig: TAKE 1 TABLET BY MOUTH DAILY       Last Visit Date (If Applicable):  4/96/9225    Next Visit Date:    Visit date not found

## 2023-08-24 RX ORDER — LORATADINE 10 MG/1
10 TABLET ORAL DAILY
Qty: 90 TABLET | Refills: 3 | Status: SHIPPED | OUTPATIENT
Start: 2023-08-24 | End: 2023-09-23

## 2023-08-29 ENCOUNTER — TELEPHONE (OUTPATIENT)
Dept: FAMILY MEDICINE CLINIC | Age: 68
End: 2023-08-29

## 2023-08-29 NOTE — TELEPHONE ENCOUNTER
When I saw her I encouraged her to call pul for f/u because she was seen by them once and never f/u again.

## 2023-08-29 NOTE — TELEPHONE ENCOUNTER
I left message on recorder to call pulmonology and get an appointment to follow up.     Darron Harvey

## 2023-08-29 NOTE — TELEPHONE ENCOUNTER
Patient calling stating that she is not any better. She states she has one pill left and feels the same.  She is asking what she can do

## 2023-08-31 ENCOUNTER — HOSPITAL ENCOUNTER (OUTPATIENT)
Age: 68
Setting detail: SPECIMEN
Discharge: HOME | End: 2023-08-31

## 2023-08-31 DIAGNOSIS — E11.9 TYPE 2 DIABETES MELLITUS WITHOUT COMPLICATION, UNSPECIFIED WHETHER LONG TERM INSULIN USE (HCC): ICD-10-CM

## 2023-08-31 DIAGNOSIS — E78.5 DYSLIPIDEMIA: ICD-10-CM

## 2023-08-31 DIAGNOSIS — J98.01 BRONCHOSPASM: ICD-10-CM

## 2023-08-31 DIAGNOSIS — R21 RASH: ICD-10-CM

## 2023-08-31 DIAGNOSIS — I25.10 ASHD (ARTERIOSCLEROTIC HEART DISEASE): ICD-10-CM

## 2023-08-31 LAB
ALBUMIN SERPL-MCNC: 4 G/DL (ref 3.5–5.2)
ALBUMIN/GLOB SERPL: 1.2 {RATIO} (ref 1–2.5)
ALP SERPL-CCNC: 65 U/L (ref 35–104)
ALT SERPL-CCNC: 31 U/L (ref 5–33)
ANION GAP SERPL CALCULATED.3IONS-SCNC: 13 MMOL/L (ref 9–17)
AST SERPL-CCNC: 34 U/L
BASOPHILS # BLD: 0.07 K/UL (ref 0–0.2)
BASOPHILS NFR BLD: 1 % (ref 0–2)
BILIRUB SERPL-MCNC: 0.6 MG/DL (ref 0.3–1.2)
BUN SERPL-MCNC: 25 MG/DL (ref 8–23)
CALCIUM SERPL-MCNC: 10.2 MG/DL (ref 8.6–10.4)
CHLORIDE SERPL-SCNC: 102 MMOL/L (ref 98–107)
CHOLEST SERPL-MCNC: 125 MG/DL
CHOLESTEROL/HDL RATIO: 3.3
CO2 SERPL-SCNC: 27 MMOL/L (ref 20–31)
CREAT SERPL-MCNC: 1 MG/DL (ref 0.5–0.9)
CREAT UR-MCNC: 247.9 MG/DL (ref 28–217)
EOSINOPHIL # BLD: 0.46 K/UL (ref 0–0.44)
EOSINOPHILS RELATIVE PERCENT: 3 % (ref 1–4)
ERYTHROCYTE [DISTWIDTH] IN BLOOD BY AUTOMATED COUNT: 12.9 % (ref 11.8–14.4)
EST. AVERAGE GLUCOSE BLD GHB EST-MCNC: 148 MG/DL
GFR SERPL CREATININE-BSD FRML MDRD: >60 ML/MIN/1.73M2
GLUCOSE SERPL-MCNC: 141 MG/DL (ref 70–99)
HBA1C MFR BLD: 6.8 % (ref 4–6)
HCT VFR BLD AUTO: 45.7 % (ref 36.3–47.1)
HDLC SERPL-MCNC: 38 MG/DL
HGB BLD-MCNC: 15.4 G/DL (ref 11.9–15.1)
IMM GRANULOCYTES # BLD AUTO: 0.08 K/UL (ref 0–0.3)
IMM GRANULOCYTES NFR BLD: 1 %
LDLC SERPL CALC-MCNC: 54 MG/DL (ref 0–130)
LYMPHOCYTES NFR BLD: 2.87 K/UL (ref 1.1–3.7)
LYMPHOCYTES RELATIVE PERCENT: 20 % (ref 24–43)
MCH RBC QN AUTO: 32.5 PG (ref 25.2–33.5)
MCHC RBC AUTO-ENTMCNC: 33.7 G/DL (ref 28.4–34.8)
MCV RBC AUTO: 96.4 FL (ref 82.6–102.9)
MICROALBUMIN UR-MCNC: 278 MG/L
MICROALBUMIN/CREAT UR-RTO: 112 MCG/MG CREAT
MONOCYTES NFR BLD: 1.05 K/UL (ref 0.1–1.2)
MONOCYTES NFR BLD: 7 % (ref 3–12)
NEUTROPHILS NFR BLD: 68 % (ref 36–65)
NEUTS SEG NFR BLD: 9.92 K/UL (ref 1.5–8.1)
NRBC BLD-RTO: 0 PER 100 WBC
PLATELET # BLD AUTO: 255 K/UL (ref 138–453)
PMV BLD AUTO: 11.8 FL (ref 8.1–13.5)
POTASSIUM SERPL-SCNC: 4.3 MMOL/L (ref 3.7–5.3)
PROT SERPL-MCNC: 7.3 G/DL (ref 6.4–8.3)
RBC # BLD AUTO: 4.74 M/UL (ref 3.95–5.11)
SODIUM SERPL-SCNC: 142 MMOL/L (ref 135–144)
TRIGL SERPL-MCNC: 164 MG/DL
WBC OTHER # BLD: 14.5 K/UL (ref 3.5–11.3)

## 2023-09-07 DIAGNOSIS — D72.829 LEUKOCYTOSIS, UNSPECIFIED TYPE: Primary | ICD-10-CM

## 2023-09-12 ENCOUNTER — HOSPITAL ENCOUNTER (OUTPATIENT)
Age: 68
Setting detail: SPECIMEN
Discharge: HOME | End: 2023-09-12

## 2023-09-12 ENCOUNTER — OFFICE VISIT (OUTPATIENT)
Dept: FAMILY MEDICINE CLINIC | Age: 68
End: 2023-09-12
Payer: MEDICARE

## 2023-09-12 VITALS
WEIGHT: 213 LBS | SYSTOLIC BLOOD PRESSURE: 130 MMHG | BODY MASS INDEX: 32.39 KG/M2 | HEART RATE: 95 BPM | DIASTOLIC BLOOD PRESSURE: 70 MMHG | OXYGEN SATURATION: 95 %

## 2023-09-12 DIAGNOSIS — Z53.20 LUNG CANCER SCREENING DECLINED BY PATIENT: ICD-10-CM

## 2023-09-12 DIAGNOSIS — Z87.891 PERSONAL HISTORY OF TOBACCO USE: ICD-10-CM

## 2023-09-12 DIAGNOSIS — E11.9 TYPE 2 DIABETES MELLITUS WITHOUT COMPLICATION, UNSPECIFIED WHETHER LONG TERM INSULIN USE (HCC): ICD-10-CM

## 2023-09-12 DIAGNOSIS — J45.20 MILD INTERMITTENT ASTHMA WITHOUT COMPLICATION: ICD-10-CM

## 2023-09-12 DIAGNOSIS — D72.829 LEUKOCYTOSIS, UNSPECIFIED TYPE: ICD-10-CM

## 2023-09-12 DIAGNOSIS — Z00.00 INITIAL MEDICARE ANNUAL WELLNESS VISIT: Primary | ICD-10-CM

## 2023-09-12 LAB
BASOPHILS # BLD: 0.03 K/UL (ref 0–0.2)
BASOPHILS NFR BLD: 0 % (ref 0–2)
EOSINOPHIL # BLD: <0.03 K/UL (ref 0–0.44)
EOSINOPHILS RELATIVE PERCENT: 0 % (ref 1–4)
ERYTHROCYTE [DISTWIDTH] IN BLOOD BY AUTOMATED COUNT: 13.1 % (ref 11.8–14.4)
HCT VFR BLD AUTO: 44.2 % (ref 36.3–47.1)
HGB BLD-MCNC: 14.5 G/DL (ref 11.9–15.1)
IMM GRANULOCYTES # BLD AUTO: 0.1 K/UL (ref 0–0.3)
IMM GRANULOCYTES NFR BLD: 1 %
LYMPHOCYTES NFR BLD: 1.51 K/UL (ref 1.1–3.7)
LYMPHOCYTES RELATIVE PERCENT: 13 % (ref 24–43)
MCH RBC QN AUTO: 31.8 PG (ref 25.2–33.5)
MCHC RBC AUTO-ENTMCNC: 32.8 G/DL (ref 28.4–34.8)
MCV RBC AUTO: 96.9 FL (ref 82.6–102.9)
MONOCYTES NFR BLD: 0.48 K/UL (ref 0.1–1.2)
MONOCYTES NFR BLD: 4 % (ref 3–12)
NEUTROPHILS NFR BLD: 82 % (ref 36–65)
NEUTS SEG NFR BLD: 9.22 K/UL (ref 1.5–8.1)
NRBC BLD-RTO: 0 PER 100 WBC
PLATELET # BLD AUTO: 201 K/UL (ref 138–453)
PMV BLD AUTO: 12.2 FL (ref 8.1–13.5)
RBC # BLD AUTO: 4.56 M/UL (ref 3.95–5.11)
WBC OTHER # BLD: 11.4 K/UL (ref 3.5–11.3)

## 2023-09-12 PROCEDURE — 1123F ACP DISCUSS/DSCN MKR DOCD: CPT | Performed by: FAMILY MEDICINE

## 2023-09-12 PROCEDURE — G0296 VISIT TO DETERM LDCT ELIG: HCPCS | Performed by: FAMILY MEDICINE

## 2023-09-12 PROCEDURE — G0438 PPPS, INITIAL VISIT: HCPCS | Performed by: FAMILY MEDICINE

## 2023-09-12 PROCEDURE — 3017F COLORECTAL CA SCREEN DOC REV: CPT | Performed by: FAMILY MEDICINE

## 2023-09-12 PROCEDURE — 3044F HG A1C LEVEL LT 7.0%: CPT | Performed by: FAMILY MEDICINE

## 2023-09-12 RX ORDER — BUDESONIDE, GLYCOPYRROLATE, AND FORMOTEROL FUMARATE 160; 9; 4.8 UG/1; UG/1; UG/1
AEROSOL, METERED RESPIRATORY (INHALATION)
COMMUNITY
Start: 2022-10-31

## 2023-09-12 RX ORDER — PREDNISONE 10 MG/1
TABLET ORAL
COMMUNITY
Start: 2022-11-17

## 2023-09-12 RX ORDER — MONTELUKAST SODIUM 10 MG/1
TABLET ORAL
COMMUNITY

## 2023-09-12 RX ORDER — LISINOPRIL 2.5 MG/1
TABLET ORAL
COMMUNITY

## 2023-09-12 ASSESSMENT — LIFESTYLE VARIABLES
HOW OFTEN DO YOU HAVE A DRINK CONTAINING ALCOHOL: MONTHLY OR LESS
HOW MANY STANDARD DRINKS CONTAINING ALCOHOL DO YOU HAVE ON A TYPICAL DAY: 1 OR 2

## 2023-09-12 ASSESSMENT — PATIENT HEALTH QUESTIONNAIRE - PHQ9
SUM OF ALL RESPONSES TO PHQ QUESTIONS 1-9: 0
1. LITTLE INTEREST OR PLEASURE IN DOING THINGS: 0
SUM OF ALL RESPONSES TO PHQ QUESTIONS 1-9: 0
SUM OF ALL RESPONSES TO PHQ QUESTIONS 1-9: 0
2. FEELING DOWN, DEPRESSED OR HOPELESS: 0
SUM OF ALL RESPONSES TO PHQ9 QUESTIONS 1 & 2: 0
SUM OF ALL RESPONSES TO PHQ QUESTIONS 1-9: 0

## 2023-10-05 ENCOUNTER — SCHEDULED TELEPHONE ENCOUNTER (OUTPATIENT)
Dept: FAMILY MEDICINE CLINIC | Age: 68
End: 2023-10-05

## 2023-10-05 DIAGNOSIS — Z79.899 MEDICATION MANAGEMENT: Primary | ICD-10-CM

## 2023-10-05 PROCEDURE — 99999 PR OFFICE/OUTPT VISIT,PROCEDURE ONLY: CPT | Performed by: PHARMACIST

## 2023-10-05 PROCEDURE — APPNB45 APP NON BILLABLE 31-45 MINUTES: Performed by: PHARMACIST

## 2023-10-05 NOTE — PROGRESS NOTES
I'm fine repeating labs then reassessing later
31G X 5/16\" 1 ML MISC Use syringe to give insulin daily. 100 each 3    clotrimazole-betamethasone (LOTRISONE) 1-0.05 % cream Apply topically 2 times daily. (Patient not taking: Reported on 8/21/2023) 45 g 0    metoprolol tartrate (LOPRESSOR) 25 MG tablet Take 1 tablet by mouth daily 90 tablet 3    metFORMIN (GLUCOPHAGE-XR) 500 MG extended release tablet Take 2 tablets by mouth daily (with breakfast) 180 tablet 3    fenofibrate (TRIGLIDE) 160 MG tablet Take 1 tablet by mouth daily 90 tablet 3    atorvastatin (LIPITOR) 80 MG tablet Take 1 tablet by mouth daily 90 tablet 3    Insulin Pen Needle 31G X 5 MM MISC Use 1 pen needle for daily insulin injection 100 each 3    aspirin (ASPIRIN LOW DOSE) 81 MG EC tablet TAKE 1 TABLET BY MOUTH ONCE DAILY 90 tablet 1    blood glucose test strips (CONTOUR NEXT TEST) strip USE TO TEST TWICE DAILY AND AS NEEDED FOR SYMPTOMS OF  IRREGULAR BLOOD GLUCOSE 300 strip 3    Semaglutide, 1 MG/DOSE, (OZEMPIC, 1 MG/DOSE,) 4 MG/3ML SOPN Inject 1 mg into the skin every 7 days 9 mL 1     No current facility-administered medications for this visit. On Statin: Yes - atorvastatin 80mg    On ACE-I or ARB for HTN or Microalbuminuria:     Medication Adherence/Cost/Adverse Events:   Approval received for the [facility] patient assistance program for Panchito Kemar and Novolin N  She has received 8x Novolin N vials and 4x Ozempic 1mg pens from the office on 2/10/23. No recent shipment from [facility] received. Phone call to company. Patient was not included in the automatic refill program.  Oversight by program.  Next shipment of both Ozempic and Novolin N should be received in the office by 07/18/2023. Patient shared that she will have enough medication until that shipment is received. Assessment/Plan     Diabetes Management:   Controlled DM as evidence by most recent A1c of 6.8% on 8/31//23 from 6.4% on 03/02/2023.  Patients reports that everything has been about the same except for additional

## 2023-10-09 ENCOUNTER — TELEPHONE (OUTPATIENT)
Dept: ONCOLOGY | Age: 68
End: 2023-10-09

## 2023-10-25 ENCOUNTER — HOSPITAL ENCOUNTER (OUTPATIENT)
Dept: CT IMAGING | Age: 68
Discharge: HOME | End: 2023-10-27
Payer: MEDICARE

## 2023-10-25 DIAGNOSIS — Z87.891 PERSONAL HISTORY OF TOBACCO USE: ICD-10-CM

## 2023-10-25 PROCEDURE — 71271 CT THORAX LUNG CANCER SCR C-: CPT

## 2023-11-20 DIAGNOSIS — I25.10 ASHD (ARTERIOSCLEROTIC HEART DISEASE): ICD-10-CM

## 2023-11-20 DIAGNOSIS — E78.5 DYSLIPIDEMIA: ICD-10-CM

## 2023-11-20 DIAGNOSIS — E11.9 TYPE 2 DIABETES MELLITUS WITHOUT COMPLICATION, UNSPECIFIED WHETHER LONG TERM INSULIN USE (HCC): ICD-10-CM

## 2023-11-20 RX ORDER — ATORVASTATIN CALCIUM 80 MG/1
80 TABLET, FILM COATED ORAL DAILY
Qty: 100 TABLET | Refills: 2 | Status: SHIPPED | OUTPATIENT
Start: 2023-11-20

## 2023-11-20 RX ORDER — FENOFIBRATE 160 MG/1
160 TABLET ORAL DAILY
Qty: 100 TABLET | Refills: 2 | Status: SHIPPED | OUTPATIENT
Start: 2023-11-20

## 2023-11-20 RX ORDER — METFORMIN HYDROCHLORIDE 500 MG/1
TABLET, EXTENDED RELEASE ORAL
Qty: 200 TABLET | Refills: 2 | Status: SHIPPED | OUTPATIENT
Start: 2023-11-20

## 2023-11-20 NOTE — TELEPHONE ENCOUNTER
Tami Kristen is calling to request a refill on the following medication(s):    Medication Request:  Requested Prescriptions     Pending Prescriptions Disp Refills    atorvastatin (LIPITOR) 80 MG tablet [Pharmacy Med Name: Atorvastatin Calcium 80 MG Oral Tablet] 100 tablet 2     Sig: TAKE 1 TABLET BY MOUTH ONCE  DAILY    metoprolol tartrate (LOPRESSOR) 25 MG tablet [Pharmacy Med Name: Metoprolol Tartrate 25 MG Oral Tablet] 100 tablet 2     Sig: TAKE 1 TABLET BY MOUTH DAILY    fenofibrate (TRIGLIDE) 160 MG tablet [Pharmacy Med Name: Fenofibrate 160 MG Oral Tablet] 100 tablet 2     Sig: TAKE 1 TABLET BY MOUTH DAILY    metFORMIN (GLUCOPHAGE-XR) 500 MG extended release tablet [Pharmacy Med Name: metFORMIN HCl  MG Oral Tablet Extended Release 24 Hour] 200 tablet 2     Sig: TAKE 2 TABLETS BY MOUTH DAILY  WITH BREAKFAST       Last Visit Date (If Applicable):  6/46/9693    Next Visit Date:    3/13/2024

## 2023-11-30 ENCOUNTER — PHARMACY VISIT (OUTPATIENT)
Dept: FAMILY MEDICINE CLINIC | Age: 68
End: 2023-11-30

## 2023-11-30 ENCOUNTER — OFFICE VISIT (OUTPATIENT)
Dept: PRIMARY CARE CLINIC | Age: 68
End: 2023-11-30
Payer: MEDICARE

## 2023-11-30 VITALS
OXYGEN SATURATION: 99 % | HEIGHT: 68 IN | BODY MASS INDEX: 32.28 KG/M2 | HEART RATE: 87 BPM | WEIGHT: 213 LBS | TEMPERATURE: 98.8 F | DIASTOLIC BLOOD PRESSURE: 78 MMHG | SYSTOLIC BLOOD PRESSURE: 131 MMHG

## 2023-11-30 DIAGNOSIS — L50.9 HIVES: Primary | ICD-10-CM

## 2023-11-30 DIAGNOSIS — Z79.899 MEDICATION MANAGEMENT: Primary | ICD-10-CM

## 2023-11-30 PROCEDURE — 1036F TOBACCO NON-USER: CPT | Performed by: NURSE PRACTITIONER

## 2023-11-30 PROCEDURE — G8484 FLU IMMUNIZE NO ADMIN: HCPCS | Performed by: NURSE PRACTITIONER

## 2023-11-30 PROCEDURE — 3017F COLORECTAL CA SCREEN DOC REV: CPT | Performed by: NURSE PRACTITIONER

## 2023-11-30 PROCEDURE — G8400 PT W/DXA NO RESULTS DOC: HCPCS | Performed by: NURSE PRACTITIONER

## 2023-11-30 PROCEDURE — 1123F ACP DISCUSS/DSCN MKR DOCD: CPT | Performed by: NURSE PRACTITIONER

## 2023-11-30 PROCEDURE — 1090F PRES/ABSN URINE INCON ASSESS: CPT | Performed by: NURSE PRACTITIONER

## 2023-11-30 PROCEDURE — APPNB60 APP NON BILLABLE TIME 46-60 MINS: Performed by: PHARMACIST

## 2023-11-30 PROCEDURE — G8427 DOCREV CUR MEDS BY ELIG CLIN: HCPCS | Performed by: NURSE PRACTITIONER

## 2023-11-30 PROCEDURE — G8417 CALC BMI ABV UP PARAM F/U: HCPCS | Performed by: NURSE PRACTITIONER

## 2023-11-30 PROCEDURE — 99213 OFFICE O/P EST LOW 20 MIN: CPT | Performed by: NURSE PRACTITIONER

## 2023-11-30 RX ORDER — PREDNISONE 20 MG/1
20 TABLET ORAL 2 TIMES DAILY
Qty: 10 TABLET | Refills: 0 | Status: SHIPPED | OUTPATIENT
Start: 2023-11-30 | End: 2023-12-05

## 2023-11-30 ASSESSMENT — ENCOUNTER SYMPTOMS
NAIL CHANGES: 0
VOMITING: 0
RHINORRHEA: 0
SHORTNESS OF BREATH: 0
URTICARIA: 1
COUGH: 0
SORE THROAT: 0
EYE PAIN: 0
DIARRHEA: 0

## 2023-11-30 NOTE — PROGRESS NOTES
Medication Management Service  [address]      Addis Medrano is a 79 y.o. female that presents for a follow-up diabetes mellitus telephone visit with Medication Management Service per referral from Dr. Stevie Hinojosa, Kellie Patrick DO for Diabetes Management under Collaborative Practice Agreement with A1c goal < 7.0 %. Subjective/Objective     New Problems/Changes: Patient showing continued control of DM as HgbA1c reported at 6.8% on 8/31/23. She was approved for the [facility] patient assistance program for Junior Drape and Novolin N vials. Patient purchases own syringes to give Novolin N. Plan to complete [facility] application today. Patient reports having more stress lately. Boyfriend was hospitalized and son-in-law was in an MVA. Still hospitalized and will probably need to go to rehab. Reports having hives all over abdomen. Was hoping to be seen this afternoon while in office. Patient believes she needs some steroids. Hives have been present for a couple of weeks. Patient has tried Benadryl for the itching. Patient is unsure what has caused the \"breakout\". Reports a similar event 20 years ago. DIET  Most days patient reports not being hungry. Eat breakfast and lunch. May not eat dinner. Will have a snack or two during the day. Salads, meat and potatoes, casseroles, etc.  Likes the frozen cokes on occasion. Stopped pop, now drinks sugar free tea. Shared that sugar drinks no longer appetizing. Admits to not eating right due to stress. Noticing blood glucose readings have been up. EXERCISE  No formal exercise routine. Continues to work.        WEIGHT  96.6 kg on 12/05/2022  BMI: 32.9 kg/m2     DIABETES MANAGEMENT    Diabetes Goals: Using ADA Standards of Care     Goal A1c:  Less than 7.0 %   Fasting Blood Sugars:  80-130mg/dL  Postprandial glucose:  Less than 180mg/dL     Lab Results   Component Value Date    LABA1C 6.8 (H)

## 2023-12-05 ENCOUNTER — HOSPITAL ENCOUNTER (OUTPATIENT)
Age: 68
Setting detail: SPECIMEN
Discharge: HOME | End: 2023-12-05

## 2023-12-05 ENCOUNTER — OFFICE VISIT (OUTPATIENT)
Dept: FAMILY MEDICINE CLINIC | Age: 68
End: 2023-12-05
Payer: MEDICARE

## 2023-12-05 VITALS
WEIGHT: 216 LBS | TEMPERATURE: 96.6 F | DIASTOLIC BLOOD PRESSURE: 62 MMHG | BODY MASS INDEX: 32.84 KG/M2 | OXYGEN SATURATION: 95 % | SYSTOLIC BLOOD PRESSURE: 128 MMHG | HEART RATE: 84 BPM

## 2023-12-05 DIAGNOSIS — L50.8 CHRONIC URTICARIA: Primary | ICD-10-CM

## 2023-12-05 DIAGNOSIS — L50.8 CHRONIC URTICARIA: ICD-10-CM

## 2023-12-05 LAB
BASOPHILS # BLD: 0.06 K/UL (ref 0–0.2)
BASOPHILS NFR BLD: 1 % (ref 0–2)
CRP SERPL HS-MCNC: <3 MG/L (ref 0–5)
EOSINOPHIL # BLD: 0.04 K/UL (ref 0–0.44)
EOSINOPHILS RELATIVE PERCENT: 0 % (ref 1–4)
ERYTHROCYTE [DISTWIDTH] IN BLOOD BY AUTOMATED COUNT: 12.2 % (ref 11.8–14.4)
ERYTHROCYTE [SEDIMENTATION RATE] IN BLOOD BY PHOTOMETRIC METHOD: 1 MM/HR (ref 0–30)
HCT VFR BLD AUTO: 45 % (ref 36.3–47.1)
HGB BLD-MCNC: 14.7 G/DL (ref 11.9–15.1)
IMM GRANULOCYTES # BLD AUTO: 0.08 K/UL (ref 0–0.3)
IMM GRANULOCYTES NFR BLD: 1 %
LYMPHOCYTES NFR BLD: 1.76 K/UL (ref 1.1–3.7)
LYMPHOCYTES RELATIVE PERCENT: 14 % (ref 24–43)
MCH RBC QN AUTO: 32.2 PG (ref 25.2–33.5)
MCHC RBC AUTO-ENTMCNC: 32.7 G/DL (ref 28.4–34.8)
MCV RBC AUTO: 98.5 FL (ref 82.6–102.9)
MONOCYTES NFR BLD: 0.6 K/UL (ref 0.1–1.2)
MONOCYTES NFR BLD: 5 % (ref 3–12)
NEUTROPHILS NFR BLD: 79 % (ref 36–65)
NEUTS SEG NFR BLD: 10.14 K/UL (ref 1.5–8.1)
NRBC BLD-RTO: 0 PER 100 WBC
PLATELET # BLD AUTO: 263 K/UL (ref 138–453)
PMV BLD AUTO: 12.6 FL (ref 8.1–13.5)
RBC # BLD AUTO: 4.57 M/UL (ref 3.95–5.11)
RHEUMATOID FACT SER NEPH-ACNC: <10 IU/ML
TSH SERPL DL<=0.05 MIU/L-ACNC: 1 UIU/ML (ref 0.3–5)
WBC OTHER # BLD: 12.7 K/UL (ref 3.5–11.3)

## 2023-12-05 PROCEDURE — G8484 FLU IMMUNIZE NO ADMIN: HCPCS | Performed by: NURSE PRACTITIONER

## 2023-12-05 PROCEDURE — 3017F COLORECTAL CA SCREEN DOC REV: CPT | Performed by: NURSE PRACTITIONER

## 2023-12-05 PROCEDURE — G8400 PT W/DXA NO RESULTS DOC: HCPCS | Performed by: NURSE PRACTITIONER

## 2023-12-05 PROCEDURE — G8417 CALC BMI ABV UP PARAM F/U: HCPCS | Performed by: NURSE PRACTITIONER

## 2023-12-05 PROCEDURE — 99214 OFFICE O/P EST MOD 30 MIN: CPT | Performed by: NURSE PRACTITIONER

## 2023-12-05 PROCEDURE — G8427 DOCREV CUR MEDS BY ELIG CLIN: HCPCS | Performed by: NURSE PRACTITIONER

## 2023-12-05 PROCEDURE — 1090F PRES/ABSN URINE INCON ASSESS: CPT | Performed by: NURSE PRACTITIONER

## 2023-12-05 PROCEDURE — 96372 THER/PROPH/DIAG INJ SC/IM: CPT | Performed by: NURSE PRACTITIONER

## 2023-12-05 PROCEDURE — 1123F ACP DISCUSS/DSCN MKR DOCD: CPT | Performed by: NURSE PRACTITIONER

## 2023-12-05 PROCEDURE — 1036F TOBACCO NON-USER: CPT | Performed by: NURSE PRACTITIONER

## 2023-12-05 RX ORDER — FAMOTIDINE 40 MG/1
40 TABLET, FILM COATED ORAL EVERY EVENING
Qty: 30 TABLET | Refills: 3 | Status: SHIPPED | OUTPATIENT
Start: 2023-12-05

## 2023-12-05 RX ORDER — FAMOTIDINE 40 MG/1
40 TABLET, FILM COATED ORAL EVERY EVENING
Qty: 90 TABLET | OUTPATIENT
Start: 2023-12-05

## 2023-12-05 RX ORDER — METHYLPREDNISOLONE SODIUM SUCCINATE 125 MG/2ML
125 INJECTION, POWDER, LYOPHILIZED, FOR SOLUTION INTRAMUSCULAR; INTRAVENOUS ONCE
Status: COMPLETED | OUTPATIENT
Start: 2023-12-05 | End: 2023-12-05

## 2023-12-05 RX ADMIN — METHYLPREDNISOLONE SODIUM SUCCINATE 125 MG: 125 INJECTION, POWDER, LYOPHILIZED, FOR SOLUTION INTRAMUSCULAR; INTRAVENOUS at 11:02

## 2023-12-05 ASSESSMENT — PATIENT HEALTH QUESTIONNAIRE - PHQ9
SUM OF ALL RESPONSES TO PHQ QUESTIONS 1-9: 0
1. LITTLE INTEREST OR PLEASURE IN DOING THINGS: 0
SUM OF ALL RESPONSES TO PHQ QUESTIONS 1-9: 0
2. FEELING DOWN, DEPRESSED OR HOPELESS: 0
SUM OF ALL RESPONSES TO PHQ9 QUESTIONS 1 & 2: 0
SUM OF ALL RESPONSES TO PHQ QUESTIONS 1-9: 0
SUM OF ALL RESPONSES TO PHQ QUESTIONS 1-9: 0

## 2023-12-05 NOTE — PROGRESS NOTES
Lakshmi Darnell, [address]  [address] [facility]. Taras Tracey, [address]  [phone]  [phone]    Sangita Franklin is a 79 y.o. female who is here with c/o of:    Chief Complaint: Rash      Patient Accompanied by: n/a    HPI - Sangita Franklin is here today with c/o:    Patient here with hives for the last several weeks. She went to urgent care and was given steroids and told to take Allegra. She has had no relief. Denies any new skin products or foods. She does have a history of hives 20 years ago and was on daily prednisone, seeing an allergist. Reports eventually they just went away. Has been under a lot of stress recently. Denies swelling of lips/throat, shortness of breath. Health Maintenance Due   Topic Date Due    Hepatitis C screen  Never done    DEXA (modify frequency per FRAX score)  Never done    Diabetic retinal exam  08/18/2016    Pneumococcal 65+ years Vaccine (2 - PCV) 03/28/2017    Hepatitis B vaccine (1 of 3 - Risk 3-dose series) Never done    Respiratory Syncytial Virus (RSV) age 61 yrs+ (1 - 1-dose 60+ series) Never done    Diabetic foot exam  03/29/2022    Flu vaccine (1) 08/01/2023    COVID-19 Vaccine (4 - 2023-24 season) 09/01/2023    DTaP/Tdap/Td vaccine (2 - Td or Tdap) 09/13/2023        Patient Active Problem List:     Dyslipidemia     Diabetes mellitus (HCC)     Urticaria, chronic     ASHD (arteriosclerotic heart disease)     Primary osteoarthritis involving multiple joints     Elevated liver enzymes     Microalbuminuria     Mild intermittent asthma without complication     Past Medical History:   Diagnosis Date    Diabetes mellitus ([address])     Dyslipidemia     Urticaria, chronic       History reviewed. No pertinent surgical history.   Family History   Problem Relation Age of Onset    High Cholesterol Father     High Blood Pressure Father     Heart Disease Father     Diabetes Father     High Cholesterol Sister     High Blood Pressure Sister      Social

## 2023-12-07 LAB
ANA SER QL IA: NEGATIVE
DSDNA IGG SER QL IA: 0.8 IU/ML
NUCLEAR IGG SER IA-RTO: 0.1 U/ML

## 2024-01-25 ENCOUNTER — SCHEDULED TELEPHONE ENCOUNTER (OUTPATIENT)
Dept: FAMILY MEDICINE CLINIC | Age: 69
End: 2024-01-25

## 2024-01-25 DIAGNOSIS — Z79.899 MEDICATION MANAGEMENT: Primary | ICD-10-CM

## 2024-01-25 PROCEDURE — 99999 PR OFFICE/OUTPT VISIT,PROCEDURE ONLY: CPT | Performed by: PHARMACIST

## 2024-01-25 PROCEDURE — APPNB60 APP NON BILLABLE TIME 46-60 MINS

## 2024-01-25 NOTE — PROGRESS NOTES
Sounds like a great plan thank you Ottoniel!      ADDENDUM 01/25/2024 2024 Re-Enrollment application re-faxed today with updated dose (Ozempic 2mg pen).  Fax confirmation received and updated re-enrollment application uploaded into [employer].    Sally Cruz, Pharm.D., [facility]  Clinical Pharmacist  [facility] Medication Management Service  [phone]  1/25/2024  6:06 PM    
    Physical Activity: No formal exercise routine. Continues to work.    WEIGHT  Weight: 99.8 kg  BMI: 33.45 kg/m2      DIABETES MANAGEMENT    Current DM Medications:     Novolin NPH 60 units every morning (vials with syringe)  Metformin XR 500mg 2 tablets daily with breakfast  Ozempic 1mg weekly on Mondays    Medication Adherence/Cost/Adverse Events: No missed doses. Tolerating medication well.     Blood Glucose Monitoring System: Fingersticks    Previous Home Glucose Readings: Last visit reported 130 - 160s    Current Home Glucose Readings: In the morning before breakfast: 170-200s    Hypoglycemia:   Classification of Hypoglycemia Events  [x]   None Reported  []   Level 1   (Glucose less than 70 mg/dL and greater than or equal to 54mg/dL)  []   Level 2   (Glucose less than 54mg/dL)  []   Level 3   (A severe event characterized by altered mental and/or physical status requiring assistance for treatment of hypoglycemia)    Patient reports some symptoms in the morning, she takes her medications then goes into work and eats breakfast. She attributes low symptoms to delayed breakfast. She does not test at work when she feels this way as she does not have her testing supplies on her.     Diabetes Goals: Using ADA Standards of Care     Goal A1c:  Less than 7 %   Fasting Blood Sugars:  80-130mg/dL  Postprandial glucose:  Less than 180mg/dL     A1C MONITORING  Lab Results   Component Value Date    LABA1C 6.8 (H) 08/31/2023    LABA1C 6.4 03/02/2023    LABA1C 6.3 08/29/2022     RENAL MONITORING  Lab Results   Component Value Date/Time    MICROALBUR 278 08/31/2023 11:37 AM    LABCREA 247.9 08/31/2023 11:37 AM     CrCl cannot be calculated (Unknown ideal weight.).    CHOLESTEROL MANAGEMENT  Lab Results   Component Value Date    CHOL 125 08/31/2023    TRIG 164 (H) 08/31/2023    HDL 38 (L) 08/31/2023    LDLCHOLESTEROL 54 08/31/2023    LDLCALC 55 09/09/2021     ALT   Date Value Ref Range Status   08/31/2023 31 5 - 33 U/L Final

## 2024-01-30 DIAGNOSIS — L50.8 CHRONIC URTICARIA: ICD-10-CM

## 2024-01-30 RX ORDER — FAMOTIDINE 40 MG/1
40 TABLET, FILM COATED ORAL EVERY EVENING
Qty: 90 TABLET | Refills: 3 | Status: SHIPPED | OUTPATIENT
Start: 2024-01-30

## 2024-01-30 NOTE — TELEPHONE ENCOUNTER
Verena Elliott is calling to request a refill on the following medication(s):    Last Visit Date (If Applicable):  12/5/2023    Next Visit Date:    03/13/2024    Medication Request:  Requested Prescriptions     Pending Prescriptions Disp Refills    famotidine (PEPCID) 40 MG tablet [Pharmacy Med Name: FAMOTIDINE 40MG TABLETS] 90 tablet      Sig: TAKE 1 TABLET BY MOUTH EVERY EVENING

## 2024-02-15 ENCOUNTER — SCHEDULED TELEPHONE ENCOUNTER (OUTPATIENT)
Dept: FAMILY MEDICINE CLINIC | Age: 69
End: 2024-02-15

## 2024-02-15 DIAGNOSIS — Z79.899 MEDICATION MANAGEMENT: Primary | ICD-10-CM

## 2024-02-15 RX ORDER — SEMAGLUTIDE 2.68 MG/ML
2 INJECTION, SOLUTION SUBCUTANEOUS
Qty: 3 ML | Refills: 0 | Status: SHIPPED
Start: 2024-02-15

## 2024-02-15 NOTE — PROGRESS NOTES
Medication Management Service  [facility]  [phone]    Verena Elliott is a 68 y.o. female that presents for a follow-up diabetes mellitus telephone visit with Medication Management Service per referral from Dr. Cook, Donal Lamb DO for Diabetes Management under Collaborative Practice Agreement with A1c goal < 7.0 %. Patient PMH includes ASCVD, Asthma, T2DM, Chronic urticaria, osteoarthritis, HLD, microalbuminuria. Complications from DM Include ASCVD and microalbuminuria .     Subjective/Objective     New Problems/Changes since last visit:   Patient appointment today for review of blood glucose & adherence with DM medications. Patient reports that she is still having hives over her belly and on her back. She reports no known cause. Continues with prednisone burst as needed.  Last burst was before Christmas.  Has been referred to an allergist.  Not yet seen by this provider mostly due to the craziness of work schedule and being able to work overtime for the extra money.  Continues with Famotidine, montelukast which was started in December for the hives.      Pt has been re-enrolled into the [employer] PAP for 2024.  1st delivery has been received in 2024.  Patient has not opened bag to see what was received.      Patient reports that she continues with Ozempic 1mg weekly, NPH 60 units daily, and Metformin XR-2 tablets daily.      LIFESTYLE  Diet:  Most days patient reports not being hungry.  Eat breakfast and lunch.  May not eat dinner.  Will have a snack or two during the day. Salads, meat and potatoes, casseroles, etc.  Likes the frozen cokes on occasion.  Stopped pop, now drinks sugar free tea. Shared that sugar drinks no longer appetizing.   Admits to not eating right due to stress. Reports more pre-prepped foods from [employer].  Likes stir lake which comes with rice.  Noticing blood glucose readings continue to be above goal.     Physical Activity: No formal exercise routine. Is in the busy time

## 2024-02-16 ENCOUNTER — TELEPHONE (OUTPATIENT)
Dept: FAMILY MEDICINE CLINIC | Age: 69
End: 2024-02-16

## 2024-02-16 DIAGNOSIS — L50.8 CHRONIC URTICARIA: Primary | ICD-10-CM

## 2024-02-16 RX ORDER — METHYLPREDNISOLONE 4 MG/1
TABLET ORAL
Qty: 1 KIT | Refills: 0 | Status: SHIPPED | OUTPATIENT
Start: 2024-02-16 | End: 2024-02-22

## 2024-02-16 NOTE — TELEPHONE ENCOUNTER
Patient called to see if you can send her in some prednisone for her hives she stated that you previous sent her in some for the same reason was hoping that she can just get in called in instead of scheduling appointment.

## 2024-03-13 ENCOUNTER — OFFICE VISIT (OUTPATIENT)
Dept: FAMILY MEDICINE CLINIC | Age: 69
End: 2024-03-13
Payer: MEDICARE

## 2024-03-13 VITALS — SYSTOLIC BLOOD PRESSURE: 136 MMHG | WEIGHT: 219 LBS | BODY MASS INDEX: 33.3 KG/M2 | DIASTOLIC BLOOD PRESSURE: 68 MMHG

## 2024-03-13 DIAGNOSIS — E78.5 DYSLIPIDEMIA: ICD-10-CM

## 2024-03-13 DIAGNOSIS — L50.8 CHRONIC URTICARIA: ICD-10-CM

## 2024-03-13 DIAGNOSIS — E11.9 TYPE 2 DIABETES MELLITUS WITHOUT COMPLICATION, UNSPECIFIED WHETHER LONG TERM INSULIN USE (HCC): Primary | ICD-10-CM

## 2024-03-13 DIAGNOSIS — J45.20 MILD INTERMITTENT ASTHMA WITHOUT COMPLICATION: ICD-10-CM

## 2024-03-13 DIAGNOSIS — I25.10 ASHD (ARTERIOSCLEROTIC HEART DISEASE): ICD-10-CM

## 2024-03-13 LAB — HBA1C MFR BLD: 7.1 %

## 2024-03-13 PROCEDURE — 83036 HEMOGLOBIN GLYCOSYLATED A1C: CPT | Performed by: FAMILY MEDICINE

## 2024-03-13 PROCEDURE — 3051F HG A1C>EQUAL 7.0%<8.0%: CPT | Performed by: FAMILY MEDICINE

## 2024-03-13 PROCEDURE — 1123F ACP DISCUSS/DSCN MKR DOCD: CPT | Performed by: FAMILY MEDICINE

## 2024-03-13 PROCEDURE — 99214 OFFICE O/P EST MOD 30 MIN: CPT | Performed by: FAMILY MEDICINE

## 2024-03-13 ASSESSMENT — PATIENT HEALTH QUESTIONNAIRE - PHQ9
SUM OF ALL RESPONSES TO PHQ QUESTIONS 1-9: 0
SUM OF ALL RESPONSES TO PHQ9 QUESTIONS 1 & 2: 0
2. FEELING DOWN, DEPRESSED OR HOPELESS: 0
1. LITTLE INTEREST OR PLEASURE IN DOING THINGS: 0

## 2024-03-13 ASSESSMENT — ENCOUNTER SYMPTOMS
SPUTUM PRODUCTION: 0
GASTROINTESTINAL NEGATIVE: 1
HOARSE VOICE: 0
SORE THROAT: 0
EYES NEGATIVE: 1
COUGH: 0
VISUAL CHANGE: 0
HEMOPTYSIS: 0
URTICARIA: 1
VOMITING: 0
EYE PAIN: 0
WHEEZING: 0
NAIL CHANGES: 0
DIFFICULTY BREATHING: 0
FREQUENT THROAT CLEARING: 0
CHEST TIGHTNESS: 0
RHINORRHEA: 0
RESPIRATORY NEGATIVE: 1
DIARRHEA: 0
ALLERGIC/IMMUNOLOGIC NEGATIVE: 1
SHORTNESS OF BREATH: 0
BLURRED VISION: 0

## 2024-03-13 NOTE — PROGRESS NOTES
[facility] Progress Note    Date:3/13/2024         Patient Name:Verena Elliott     YOB: 1955     Age:68 y.o.    Assessment/Plan        Problem List Items Addressed This Visit          Circulatory    ASHD (arteriosclerotic heart disease)      At goal, continue current medications and continue current treatment plan            Respiratory    Mild intermittent asthma without complication      At goal, continue current medications and continue current treatment plan            Endocrine    Diabetes mellitus (HCC) - Primary      Borderline controlled, continue current medications, continue current treatment plan, medication adherence emphasized, and lifestyle modifications recommended         Relevant Orders    POCT glycosylated hemoglobin (Hb A1C) (Completed)    Microalbumin, Ur       Musculoskeletal and Integument    Chronic urticaria      Uncontrolled, changes made today: refer to allergy         Relevant Orders    External Referral To Allergy    CBC with Auto Differential       Other    Dyslipidemia      At goal, continue current medications, continue current treatment plan, medication adherence emphasized, and lifestyle modifications recommended         Relevant Orders    Comprehensive Metabolic Panel    Lipid Panel        Return in about 6 months (around 9/13/2024).    Electronically signed by Donal Cook DO on 3/13/24       Total time spent was between Time personally spent assessing and managing the patient on the date of service: Est: 30-39 minutes ([id number]) mins. This included time spent reviewing the patient's medical record (e.g., recent visits, labs, and studies); seeing the patient in the office (face-to-face time); ordering medications, studies, procedures, or referrals; calling the patient or family later in the day with results and further recommendations; and documenting the visit in the medical record.     Subjective     Verena Elliott is a 68 y.o. female presenting today for   Chief

## 2024-03-19 DIAGNOSIS — E11.9 TYPE 2 DIABETES MELLITUS WITHOUT COMPLICATION, UNSPECIFIED WHETHER LONG TERM INSULIN USE (HCC): ICD-10-CM

## 2024-03-19 DIAGNOSIS — L50.8 CHRONIC URTICARIA: ICD-10-CM

## 2024-03-19 DIAGNOSIS — E78.5 DYSLIPIDEMIA: ICD-10-CM

## 2024-03-19 DIAGNOSIS — I25.10 ASHD (ARTERIOSCLEROTIC HEART DISEASE): ICD-10-CM

## 2024-03-19 RX ORDER — FAMOTIDINE 40 MG/1
40 TABLET, FILM COATED ORAL EVERY EVENING
Qty: 90 TABLET | Refills: 1 | Status: CANCELLED | OUTPATIENT
Start: 2024-03-19

## 2024-03-19 RX ORDER — METFORMIN HYDROCHLORIDE 500 MG/1
1000 TABLET, EXTENDED RELEASE ORAL
Qty: 180 TABLET | Refills: 1 | Status: CANCELLED | OUTPATIENT
Start: 2024-03-19

## 2024-03-19 RX ORDER — ATORVASTATIN CALCIUM 80 MG/1
80 TABLET, FILM COATED ORAL DAILY
Qty: 90 TABLET | Refills: 1 | Status: CANCELLED | OUTPATIENT
Start: 2024-03-19

## 2024-03-19 NOTE — TELEPHONE ENCOUNTER
Verena Elliott is calling to request a refill on the following medication(s):    Medication Request:  Requested Prescriptions     Pending Prescriptions Disp Refills    atorvastatin (LIPITOR) 80 MG tablet 90 tablet 1     Sig: Take 1 tablet by mouth daily    famotidine (PEPCID) 40 MG tablet 90 tablet 1     Sig: Take 1 tablet by mouth every evening    metoprolol tartrate (LOPRESSOR) 25 MG tablet 90 tablet 1     Sig: Take 1 tablet by mouth daily    metFORMIN (GLUCOPHAGE-XR) 500 MG extended release tablet 180 tablet 1     Sig: Take 2 tablets by mouth daily (with breakfast)       Last Visit Date (If Applicable):  3/13/2024    Next Visit Date:    9/13/2024

## 2024-03-19 NOTE — TELEPHONE ENCOUNTER
Verena Elliott is calling to request a refill on the following medication(s):    Medication Request:  Requested Prescriptions     Pending Prescriptions Disp Refills    famotidine (PEPCID) 40 MG tablet 90 tablet 3     Sig: Take 1 tablet by mouth every evening       Last Visit Date (If Applicable):  3/13/2024    Next Visit Date:    9/13/2024

## 2024-03-19 NOTE — TELEPHONE ENCOUNTER
Verena Elliott is calling to request a refill on the following medication(s):    Medication Request:  Requested Prescriptions     Pending Prescriptions Disp Refills    metoprolol tartrate (LOPRESSOR) 25 MG tablet 100 tablet 2     Sig: Take 1 tablet by mouth daily    atorvastatin (LIPITOR) 80 MG tablet 100 tablet 2     Sig: Take 1 tablet by mouth daily    metFORMIN (GLUCOPHAGE-XR) 500 MG extended release tablet 200 tablet 2     Sig: Take 2 tablets by mouth daily (with breakfast)       Last Visit Date (If Applicable):  3/13/2024    Next Visit Date:    9/13/2024

## 2024-03-20 RX ORDER — ATORVASTATIN CALCIUM 80 MG/1
80 TABLET, FILM COATED ORAL DAILY
Qty: 90 TABLET | Refills: 3 | Status: SHIPPED | OUTPATIENT
Start: 2024-03-20

## 2024-03-20 RX ORDER — METFORMIN HYDROCHLORIDE 500 MG/1
1000 TABLET, EXTENDED RELEASE ORAL
Qty: 180 TABLET | Refills: 3 | Status: SHIPPED | OUTPATIENT
Start: 2024-03-20

## 2024-03-20 RX ORDER — METFORMIN HYDROCHLORIDE 500 MG/1
1000 TABLET, EXTENDED RELEASE ORAL
Qty: 200 TABLET | Refills: 2 | OUTPATIENT
Start: 2024-03-20

## 2024-03-20 RX ORDER — FAMOTIDINE 40 MG/1
40 TABLET, FILM COATED ORAL EVERY EVENING
Qty: 90 TABLET | Refills: 3 | Status: SHIPPED | OUTPATIENT
Start: 2024-03-20

## 2024-03-20 RX ORDER — ATORVASTATIN CALCIUM 80 MG/1
80 TABLET, FILM COATED ORAL DAILY
Qty: 100 TABLET | Refills: 2 | OUTPATIENT
Start: 2024-03-20

## 2024-03-28 ENCOUNTER — SCHEDULED TELEPHONE ENCOUNTER (OUTPATIENT)
Dept: FAMILY MEDICINE CLINIC | Age: 69
End: 2024-03-28

## 2024-03-28 DIAGNOSIS — Z79.899 MEDICATION MANAGEMENT: Primary | ICD-10-CM

## 2024-03-28 PROCEDURE — APPNB30 APP NON BILLABLE TIME 0-30 MINS: Performed by: PHARMACIST

## 2024-03-28 PROCEDURE — 99999 PR OFFICE/OUTPT VISIT,PROCEDURE ONLY: CPT | Performed by: PHARMACIST

## 2024-03-28 NOTE — PROGRESS NOTES
Medication Management Service  [facility]  [phone]    Verena Elliott is a 68 y.o. female that presents for a follow-up diabetes mellitus telephone visit with Medication Management Service per referral from Dr. Cook, Donal Lamb DO for Diabetes Management under Collaborative Practice Agreement with A1c goal < 7.0 %. Patient PMH includes ASCVD, Asthma, T2DM, Chronic urticaria, osteoarthritis, HLD, microalbuminuria. Complications from DM Include ASCVD and microalbuminuria .     Subjective/Objective     New Problems/Changes since last visit:   Patient appointment today for review of blood glucose & adherence with DM medications. Patient reports that she is still having hives over her belly and on her back. Still no known cause. Continues with prednisone burst as needed.  Last burst was before Nav.  Has been referred to an allergist.  Appointment scheduled for 04/08/2024.     Patient reports that she continues with Ozempic 2mg weekly, NPH 60 units daily, and Metformin XR-2 tablets daily.      LIFESTYLE  Diet:  Most days patient reports not being hungry.  Eat breakfast and lunch.  May not eat dinner.  Will have a snack or two during the day. Salads, meat and potatoes, casseroles, etc.  Likes the frozen cokes on occasion.  Stopped pop, now drinks sugar free tea. Shared that sugar drinks no longer appetizing.   Admits to not eating right due to stress. Reports more pre-prepped foods from [employer].  Likes stir lake which comes with rice.  Noticing blood glucose readings continue to be above goal. No changes.      Physical Activity: No formal exercise routine. Is in the busy time of the year for work.  Working long hour.  More manual labor of recent.  Reports walking 5 miles on some days.  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 Likes to overtime and extra money for vacations.      WEIGHT  Weight: 99.3 kg  BMI: 33.30 kg/m2      DIABETES MANAGEMENT    Current DM Medications:   Novolin NPH 60 units every

## 2024-04-02 ENCOUNTER — APPOINTMENT (OUTPATIENT)
Dept: CT IMAGING | Age: 69
DRG: 853 | End: 2024-04-02
Payer: COMMERCIAL

## 2024-04-02 ENCOUNTER — HOSPITAL ENCOUNTER (INPATIENT)
Age: 69
LOS: 4 days | Discharge: HOME HEALTH | DRG: 853 | End: 2024-04-06
Attending: EMERGENCY MEDICINE | Admitting: INTERNAL MEDICINE
Payer: COMMERCIAL

## 2024-04-02 ENCOUNTER — APPOINTMENT (OUTPATIENT)
Dept: GENERAL RADIOLOGY | Age: 69
DRG: 853 | End: 2024-04-02
Payer: COMMERCIAL

## 2024-04-02 ENCOUNTER — TELEPHONE (OUTPATIENT)
Dept: FAMILY MEDICINE CLINIC | Age: 69
End: 2024-04-02

## 2024-04-02 DIAGNOSIS — N20.0 KIDNEY STONE: ICD-10-CM

## 2024-04-02 DIAGNOSIS — N20.1 URETERAL CALCULUS: ICD-10-CM

## 2024-04-02 DIAGNOSIS — N39.0 URINARY TRACT INFECTION WITHOUT HEMATURIA, SITE UNSPECIFIED: Primary | ICD-10-CM

## 2024-04-02 DIAGNOSIS — N17.9 AKI (ACUTE KIDNEY INJURY) (HCC): ICD-10-CM

## 2024-04-02 PROBLEM — N13.2 HYDRONEPHROSIS CONCURRENT WITH AND DUE TO CALCULI OF KIDNEY AND URETER: Status: ACTIVE | Noted: 2024-04-02

## 2024-04-02 LAB
ALBUMIN SERPL-MCNC: 2.5 G/DL (ref 3.5–5.2)
ALP SERPL-CCNC: 122 U/L (ref 35–104)
ALT SERPL-CCNC: 40 U/L (ref 5–33)
AMYLASE SERPL-CCNC: 20 U/L (ref 28–100)
ANION GAP SERPL CALCULATED.3IONS-SCNC: 13 MMOL/L (ref 9–17)
AST SERPL-CCNC: 43 U/L
BASOPHILS # BLD: 0 K/UL (ref 0–0.2)
BASOPHILS NFR BLD: 0 %
BILIRUB SERPL-MCNC: 1 MG/DL (ref 0.3–1.2)
BUN SERPL-MCNC: 70 MG/DL (ref 8–23)
BUN/CREAT SERPL: 25 (ref 9–20)
CALCIUM SERPL-MCNC: 9 MG/DL (ref 8.6–10.4)
CHLORIDE SERPL-SCNC: 96 MMOL/L (ref 98–107)
CO2 SERPL-SCNC: 22 MMOL/L (ref 20–31)
CREAT SERPL-MCNC: 2.8 MG/DL (ref 0.5–0.9)
EOSINOPHIL # BLD: 0 K/UL (ref 0–0.4)
EOSINOPHILS RELATIVE PERCENT: 0 % (ref 1–4)
ERYTHROCYTE [DISTWIDTH] IN BLOOD BY AUTOMATED COUNT: 13.6 % (ref 11.8–14.4)
FLUAV RNA RESP QL NAA+PROBE: NOT DETECTED
FLUBV RNA RESP QL NAA+PROBE: NOT DETECTED
GFR SERPL CREATININE-BSD FRML MDRD: 18 ML/MIN/1.73M2
GLUCOSE SERPL-MCNC: 279 MG/DL (ref 70–99)
HCT VFR BLD AUTO: 37.7 % (ref 36.3–47.1)
HGB BLD-MCNC: 12.1 G/DL (ref 11.9–15.1)
IMM GRANULOCYTES # BLD AUTO: 0.14 K/UL (ref 0–0.3)
IMM GRANULOCYTES NFR BLD: 1 %
LACTATE BLDV-SCNC: 1.9 MMOL/L (ref 0.5–2.2)
LIPASE SERPL-CCNC: 32 U/L (ref 13–60)
LYMPHOCYTES NFR BLD: 0.72 K/UL (ref 1–4.8)
LYMPHOCYTES RELATIVE PERCENT: 5 % (ref 24–44)
MCH RBC QN AUTO: 31.2 PG (ref 25.2–33.5)
MCHC RBC AUTO-ENTMCNC: 32.1 G/DL (ref 28.4–34.8)
MCV RBC AUTO: 97.2 FL (ref 82.6–102.9)
MONOCYTES NFR BLD: 1.29 K/UL (ref 0.2–0.8)
MONOCYTES NFR BLD: 9 % (ref 1–7)
MORPHOLOGY: ABNORMAL
NEUTROPHILS NFR BLD: 85 % (ref 36–66)
NEUTS SEG NFR BLD: 12.15 K/UL (ref 1.8–7.7)
NRBC BLD-RTO: 0 PER 100 WBC
PLATELET # BLD AUTO: 70 K/UL (ref 138–453)
PMV BLD AUTO: 12.9 FL (ref 8.1–13.5)
POTASSIUM SERPL-SCNC: 3.9 MMOL/L (ref 3.7–5.3)
PROT SERPL-MCNC: 6 G/DL (ref 6.4–8.3)
RBC # BLD AUTO: 3.88 M/UL (ref 3.95–5.11)
SARS-COV-2 RNA RESP QL NAA+PROBE: NOT DETECTED
SODIUM SERPL-SCNC: 131 MMOL/L (ref 135–144)
SOURCE: NORMAL
SPECIMEN DESCRIPTION: NORMAL
TROPONIN I SERPL HS-MCNC: 24 NG/L (ref 0–14)
WBC OTHER # BLD: 14.3 K/UL (ref 3.5–11.3)

## 2024-04-02 PROCEDURE — 71045 X-RAY EXAM CHEST 1 VIEW: CPT

## 2024-04-02 PROCEDURE — 96374 THER/PROPH/DIAG INJ IV PUSH: CPT

## 2024-04-02 PROCEDURE — 84300 ASSAY OF URINE SODIUM: CPT

## 2024-04-02 PROCEDURE — 85025 COMPLETE CBC W/AUTO DIFF WBC: CPT

## 2024-04-02 PROCEDURE — 93005 ELECTROCARDIOGRAM TRACING: CPT | Performed by: EMERGENCY MEDICINE

## 2024-04-02 PROCEDURE — 84560 ASSAY OF URINE/URIC ACID: CPT

## 2024-04-02 PROCEDURE — 74176 CT ABD & PELVIS W/O CONTRAST: CPT

## 2024-04-02 PROCEDURE — 2580000003 HC RX 258: Performed by: EMERGENCY MEDICINE

## 2024-04-02 PROCEDURE — 96375 TX/PRO/DX INJ NEW DRUG ADDON: CPT

## 2024-04-02 PROCEDURE — 83690 ASSAY OF LIPASE: CPT

## 2024-04-02 PROCEDURE — 81001 URINALYSIS AUTO W/SCOPE: CPT

## 2024-04-02 PROCEDURE — 84156 ASSAY OF PROTEIN URINE: CPT

## 2024-04-02 PROCEDURE — 80053 COMPREHEN METABOLIC PANEL: CPT

## 2024-04-02 PROCEDURE — 83605 ASSAY OF LACTIC ACID: CPT

## 2024-04-02 PROCEDURE — 99223 1ST HOSP IP/OBS HIGH 75: CPT | Performed by: INTERNAL MEDICINE

## 2024-04-02 PROCEDURE — 82150 ASSAY OF AMYLASE: CPT

## 2024-04-02 PROCEDURE — 6360000002 HC RX W HCPCS: Performed by: EMERGENCY MEDICINE

## 2024-04-02 PROCEDURE — 2060000000 HC ICU INTERMEDIATE R&B

## 2024-04-02 PROCEDURE — 96361 HYDRATE IV INFUSION ADD-ON: CPT

## 2024-04-02 PROCEDURE — 84484 ASSAY OF TROPONIN QUANT: CPT

## 2024-04-02 PROCEDURE — 82570 ASSAY OF URINE CREATININE: CPT

## 2024-04-02 PROCEDURE — 99285 EMERGENCY DEPT VISIT HI MDM: CPT

## 2024-04-02 PROCEDURE — 87636 SARSCOV2 & INF A&B AMP PRB: CPT

## 2024-04-02 RX ORDER — ONDANSETRON 2 MG/ML
4 INJECTION INTRAMUSCULAR; INTRAVENOUS EVERY 6 HOURS PRN
Status: DISCONTINUED | OUTPATIENT
Start: 2024-04-02 | End: 2024-04-06 | Stop reason: HOSPADM

## 2024-04-02 RX ORDER — ENOXAPARIN SODIUM 100 MG/ML
40 INJECTION SUBCUTANEOUS DAILY
Status: DISCONTINUED | OUTPATIENT
Start: 2024-04-03 | End: 2024-04-02 | Stop reason: ALTCHOICE

## 2024-04-02 RX ORDER — ACETAMINOPHEN 650 MG/1
650 SUPPOSITORY RECTAL EVERY 6 HOURS PRN
Status: DISCONTINUED | OUTPATIENT
Start: 2024-04-02 | End: 2024-04-06 | Stop reason: HOSPADM

## 2024-04-02 RX ORDER — POTASSIUM CHLORIDE 7.45 MG/ML
10 INJECTION INTRAVENOUS PRN
Status: DISCONTINUED | OUTPATIENT
Start: 2024-04-02 | End: 2024-04-02

## 2024-04-02 RX ORDER — SODIUM CHLORIDE 0.9 % (FLUSH) 0.9 %
5-40 SYRINGE (ML) INJECTION EVERY 12 HOURS SCHEDULED
Status: DISCONTINUED | OUTPATIENT
Start: 2024-04-02 | End: 2024-04-06 | Stop reason: HOSPADM

## 2024-04-02 RX ORDER — POTASSIUM CHLORIDE 20 MEQ/1
40 TABLET, EXTENDED RELEASE ORAL PRN
Status: DISCONTINUED | OUTPATIENT
Start: 2024-04-02 | End: 2024-04-02

## 2024-04-02 RX ORDER — 0.9 % SODIUM CHLORIDE 0.9 %
1000 INTRAVENOUS SOLUTION INTRAVENOUS ONCE
Status: COMPLETED | OUTPATIENT
Start: 2024-04-02 | End: 2024-04-02

## 2024-04-02 RX ORDER — MORPHINE SULFATE 2 MG/ML
2 INJECTION, SOLUTION INTRAMUSCULAR; INTRAVENOUS EVERY 4 HOURS PRN
Status: DISCONTINUED | OUTPATIENT
Start: 2024-04-02 | End: 2024-04-06 | Stop reason: HOSPADM

## 2024-04-02 RX ORDER — SODIUM CHLORIDE 9 MG/ML
INJECTION, SOLUTION INTRAVENOUS PRN
Status: DISCONTINUED | OUTPATIENT
Start: 2024-04-02 | End: 2024-04-06 | Stop reason: HOSPADM

## 2024-04-02 RX ORDER — HEPARIN SODIUM 5000 [USP'U]/ML
5000 INJECTION, SOLUTION INTRAVENOUS; SUBCUTANEOUS EVERY 8 HOURS SCHEDULED
Status: DISCONTINUED | OUTPATIENT
Start: 2024-04-02 | End: 2024-04-06 | Stop reason: HOSPADM

## 2024-04-02 RX ORDER — SODIUM CHLORIDE 9 MG/ML
INJECTION, SOLUTION INTRAVENOUS CONTINUOUS
Status: DISCONTINUED | OUTPATIENT
Start: 2024-04-02 | End: 2024-04-06

## 2024-04-02 RX ORDER — SODIUM CHLORIDE 0.9 % (FLUSH) 0.9 %
5-40 SYRINGE (ML) INJECTION PRN
Status: DISCONTINUED | OUTPATIENT
Start: 2024-04-02 | End: 2024-04-06 | Stop reason: HOSPADM

## 2024-04-02 RX ORDER — MAGNESIUM SULFATE IN WATER 40 MG/ML
2000 INJECTION, SOLUTION INTRAVENOUS PRN
Status: DISCONTINUED | OUTPATIENT
Start: 2024-04-02 | End: 2024-04-02

## 2024-04-02 RX ORDER — ONDANSETRON 4 MG/1
4 TABLET, ORALLY DISINTEGRATING ORAL EVERY 8 HOURS PRN
Status: DISCONTINUED | OUTPATIENT
Start: 2024-04-02 | End: 2024-04-06 | Stop reason: HOSPADM

## 2024-04-02 RX ORDER — MORPHINE SULFATE 4 MG/ML
4 INJECTION, SOLUTION INTRAMUSCULAR; INTRAVENOUS ONCE
Status: COMPLETED | OUTPATIENT
Start: 2024-04-02 | End: 2024-04-02

## 2024-04-02 RX ORDER — ACETAMINOPHEN 325 MG/1
650 TABLET ORAL EVERY 6 HOURS PRN
Status: DISCONTINUED | OUTPATIENT
Start: 2024-04-02 | End: 2024-04-06 | Stop reason: HOSPADM

## 2024-04-02 RX ORDER — POLYETHYLENE GLYCOL 3350 17 G/17G
17 POWDER, FOR SOLUTION ORAL DAILY PRN
Status: DISCONTINUED | OUTPATIENT
Start: 2024-04-02 | End: 2024-04-06 | Stop reason: HOSPADM

## 2024-04-02 RX ORDER — ONDANSETRON 2 MG/ML
4 INJECTION INTRAMUSCULAR; INTRAVENOUS ONCE
Status: COMPLETED | OUTPATIENT
Start: 2024-04-02 | End: 2024-04-02

## 2024-04-02 RX ADMIN — SODIUM CHLORIDE 1000 ML: 9 INJECTION, SOLUTION INTRAVENOUS at 19:17

## 2024-04-02 RX ADMIN — SODIUM CHLORIDE: 9 INJECTION, SOLUTION INTRAVENOUS at 21:48

## 2024-04-02 RX ADMIN — ONDANSETRON 4 MG: 2 INJECTION INTRAMUSCULAR; INTRAVENOUS at 19:16

## 2024-04-02 RX ADMIN — MORPHINE SULFATE 4 MG: 4 INJECTION, SOLUTION INTRAMUSCULAR; INTRAVENOUS at 19:16

## 2024-04-02 RX ADMIN — WATER 1000 MG: 1 INJECTION INTRAMUSCULAR; INTRAVENOUS; SUBCUTANEOUS at 22:24

## 2024-04-02 ASSESSMENT — ENCOUNTER SYMPTOMS
EYE PAIN: 0
SHORTNESS OF BREATH: 0
ABDOMINAL DISTENTION: 0
EYE DISCHARGE: 0
VOMITING: 1
FACIAL SWELLING: 0
BACK PAIN: 0
CHEST TIGHTNESS: 0
ABDOMINAL PAIN: 1
NAUSEA: 1

## 2024-04-02 ASSESSMENT — PAIN DESCRIPTION - LOCATION
LOCATION: ABDOMEN
LOCATION: ABDOMEN

## 2024-04-02 ASSESSMENT — PAIN DESCRIPTION - DESCRIPTORS: DESCRIPTORS: CRAMPING

## 2024-04-02 ASSESSMENT — PAIN SCALES - GENERAL
PAINLEVEL_OUTOF10: 0
PAINLEVEL_OUTOF10: 4
PAINLEVEL_OUTOF10: 6
PAINLEVEL_OUTOF10: 4

## 2024-04-02 ASSESSMENT — PAIN DESCRIPTION - PAIN TYPE: TYPE: ACUTE PAIN

## 2024-04-02 ASSESSMENT — PAIN - FUNCTIONAL ASSESSMENT
PAIN_FUNCTIONAL_ASSESSMENT: 0-10
PAIN_FUNCTIONAL_ASSESSMENT: PREVENTS OR INTERFERES SOME ACTIVE ACTIVITIES AND ADLS

## 2024-04-02 ASSESSMENT — PAIN DESCRIPTION - FREQUENCY: FREQUENCY: INTERMITTENT

## 2024-04-02 ASSESSMENT — PAIN DESCRIPTION - ORIENTATION
ORIENTATION: RIGHT;LOWER
ORIENTATION: RIGHT;LOWER

## 2024-04-02 ASSESSMENT — PAIN DESCRIPTION - ONSET: ONSET: ON-GOING

## 2024-04-02 NOTE — TELEPHONE ENCOUNTER
Ok lets get her on the schedule with one of the providers in our office this week for evaluation please

## 2024-04-02 NOTE — TELEPHONE ENCOUNTER
SUBJECT: fatigue  PATIENT/CALLER:Mariel  CURRENT SYMPTOMS: fatigue  ONSET: 6 days  PAIN LEVEL: no current pain, did have headache for 5 days, and body aches  TEMP: no fever  WHAT HAS BEEN TRIED:  drinking water and taking advil.  Patient states that she can only stand to go to the bathroom. Says that she has had a little protein to eat. I did inform patient to make sure she stays hydrated and if she gets worse to go to the ER. She says that she is unable to go to work today.

## 2024-04-02 NOTE — ED PROVIDER NOTES
EMERGENCY DEPARTMENT ENCOUNTER    Pt Name: Verena Elliott  MRN: [id number]  Birthdate 1955  Date of evaluation: 4/2/24  CHIEF COMPLAINT       Chief Complaint   Patient presents with    Generalized Body Aches     Pt reports generalized body aches since Thursday night. Reports cough    Abdominal Pain     RLQ abdominal pain with nausea     HISTORY OF PRESENT ILLNESS   HPI   The patient is a 68-year-old female presented to the emergency department secondary to abdominal pain with generalized bodyaches.  Patient says since Thursday as she has generalized bodyaches with nausea and vomiting.  No diarrhea.  She complains of pain localized to her right lower quadrant.  She had a previous history of IBS Crohn's or diverticulosis.  Patient denies chest pain, fevers or chills      REVIEW OF SYSTEMS     Review of Systems   Constitutional:  Negative for chills, diaphoresis and fever.   HENT:  Negative for congestion, ear pain and facial swelling.    Eyes:  Negative for pain, discharge and visual disturbance.   Respiratory:  Negative for chest tightness and shortness of breath.    Cardiovascular:  Negative for chest pain and palpitations.   Gastrointestinal:  Positive for abdominal pain, nausea and vomiting. Negative for abdominal distention.   Genitourinary:  Negative for difficulty urinating and flank pain.   Musculoskeletal:  Negative for back pain.   Skin:  Negative for wound.   Neurological:  Negative for dizziness, light-headedness and headaches.     PASTMEDICAL HISTORY     Past Medical History:   Diagnosis Date    Diabetes mellitus (HCC)     Dyslipidemia     Urticaria, chronic      Past Problem List  Patient Active Problem List   Diagnosis Code    Dyslipidemia E78.5    Diabetes mellitus (HCC) E11.9    Chronic urticaria L50.8    ASHD (arteriosclerotic heart disease) I25.10    Primary osteoarthritis involving multiple joints M15.9    Elevated liver enzymes R74.8    Microalbuminuria R80.9    Mild intermittent asthma without  within normal limits   COMPREHENSIVE METABOLIC PANEL - Abnormal; Notable for the following components:    Sodium 131 (*)     Chloride 96 (*)     Glucose 279 (*)     BUN 70 (*)     Creatinine 2.8 (*)     Est, Glom Filt Rate 18 (*)     Bun/Cre Ratio 25 (*)     Total Protein 6.0 (*)     Albumin 2.5 (*)     Alkaline Phosphatase 122 (*)     ALT 40 (*)     AST 43 (*)     All other components within normal limits   TROPONIN - Abnormal; Notable for the following components:    Troponin, High Sensitivity 24 (*)     All other components within normal limits   COVID-19 & INFLUENZA COMBO   CULTURE, URINE   LACTIC ACID   LIPASE   SPECIMEN REJECTION   SODIUM, URINE, RANDOM   PROTEIN, URINE, RANDOM   URIC ACID, URINE RANDOM   CREATININE, RANDOM URINE   CBC WITH AUTO DIFFERENTIAL       Vitals Reviewed:    Vitals:    04/02/24 1930 04/02/24 2000 04/02/24 2015 04/02/24 2215   BP: 105/60 106/61 106/60    Pulse: 89 90 88    Resp: 26 21 26    Temp:       TempSrc:       SpO2: 95% 95% 95%    Weight:    98 kg (216 lb 0.8 oz)     MEDICATIONS GIVEN TO PATIENT THIS ENCOUNTER:  Orders Placed This Encounter   Medications    ondansetron (ZOFRAN) injection 4 mg    sodium chloride 0.9 % bolus 1,000 mL    morphine sulfate (PF) injection 4 mg    0.9 % sodium chloride infusion    cefTRIAXone (ROCEPHIN) 1,000 mg in sterile water 10 mL IV syringe     Order Specific Question:   Antimicrobial Indications     Answer:   Urinary Tract Infection    sodium chloride flush 0.9 % injection 5-40 mL    sodium chloride flush 0.9 % injection 5-40 mL    0.9 % sodium chloride infusion    DISCONTD: potassium chloride (KLOR-CON M) extended release tablet 40 mEq    DISCONTD: potassium bicarb-citric acid (EFFER-K) effervescent tablet 40 mEq    DISCONTD: potassium chloride 10 mEq/100 mL IVPB (Peripheral Line)    DISCONTD: magnesium sulfate 2000 mg in 50 mL IVPB premix    enoxaparin (LOVENOX) injection 40 mg     Order Specific Question:   Indication of Use     Answer:

## 2024-04-02 NOTE — ED NOTES
Pt arrived to ED with c/o generalized body aches and abdominal pain. Pt states she is having RLQ pain. Pt states symptoms started Thursday. Pt states she has not been able to get out of bed due to fatigue. Pt states on Thursday she had a couple episodes of vomiting that cleared her stomach out. Pt states she has not had any vomiting since but has hardly ate. Pt is A&Ox4. Pts son at bedside.

## 2024-04-03 ENCOUNTER — ANESTHESIA EVENT (OUTPATIENT)
Dept: OPERATING ROOM | Age: 69
DRG: 853 | End: 2024-04-03
Payer: COMMERCIAL

## 2024-04-03 ENCOUNTER — APPOINTMENT (OUTPATIENT)
Dept: GENERAL RADIOLOGY | Age: 69
DRG: 853 | End: 2024-04-03
Attending: UROLOGY
Payer: COMMERCIAL

## 2024-04-03 ENCOUNTER — ANESTHESIA (OUTPATIENT)
Dept: OPERATING ROOM | Age: 69
DRG: 853 | End: 2024-04-03
Payer: COMMERCIAL

## 2024-04-03 PROBLEM — N10 ACUTE PYELONEPHRITIS: Status: ACTIVE | Noted: 2024-04-03

## 2024-04-03 PROBLEM — D69.6 THROMBOCYTOPENIA (HCC): Status: ACTIVE | Noted: 2024-04-03

## 2024-04-03 PROBLEM — A41.50 SEPSIS DUE TO GRAM-NEGATIVE URINARY TRACT INFECTION (HCC): Status: ACTIVE | Noted: 2024-04-03

## 2024-04-03 PROBLEM — N39.0 SEPSIS DUE TO GRAM-NEGATIVE URINARY TRACT INFECTION (HCC): Status: ACTIVE | Noted: 2024-04-03

## 2024-04-03 LAB
ANION GAP SERPL CALCULATED.3IONS-SCNC: 11 MMOL/L (ref 9–17)
BACTERIA URNS QL MICRO: ABNORMAL
BASOPHILS # BLD: 0 K/UL (ref 0–0.2)
BASOPHILS NFR BLD: 0 %
BILIRUB UR QL STRIP: ABNORMAL
BUN SERPL-MCNC: 76 MG/DL (ref 8–23)
BUN/CREAT SERPL: 33 (ref 9–20)
CALCIUM SERPL-MCNC: 8.5 MG/DL (ref 8.6–10.4)
CHLORIDE SERPL-SCNC: 97 MMOL/L (ref 98–107)
CLARITY UR: ABNORMAL
CO2 SERPL-SCNC: 20 MMOL/L (ref 20–31)
COLOR UR: YELLOW
CREAT SERPL-MCNC: 2.3 MG/DL (ref 0.5–0.9)
CREAT UR-MCNC: 195 MG/DL (ref 28–217)
EOSINOPHIL # BLD: 0 K/UL (ref 0–0.4)
EOSINOPHILS RELATIVE PERCENT: 0 % (ref 1–4)
EPI CELLS #/AREA URNS HPF: ABNORMAL /HPF (ref 0–5)
ERYTHROCYTE [DISTWIDTH] IN BLOOD BY AUTOMATED COUNT: 13.9 % (ref 11.8–14.4)
GFR SERPL CREATININE-BSD FRML MDRD: 23 ML/MIN/1.73M2
GLUCOSE BLD-MCNC: 219 MG/DL (ref 65–105)
GLUCOSE BLD-MCNC: 219 MG/DL (ref 65–105)
GLUCOSE BLD-MCNC: 227 MG/DL (ref 65–105)
GLUCOSE BLD-MCNC: 275 MG/DL (ref 65–105)
GLUCOSE SERPL-MCNC: 224 MG/DL (ref 70–99)
GLUCOSE UR STRIP-MCNC: ABNORMAL MG/DL
HCT VFR BLD AUTO: 33 % (ref 36.3–47.1)
HGB BLD-MCNC: 10.7 G/DL (ref 11.9–15.1)
HGB UR QL STRIP.AUTO: ABNORMAL
IMM GRANULOCYTES # BLD AUTO: 0 K/UL (ref 0–0.3)
IMM GRANULOCYTES NFR BLD: 0 %
KETONES UR STRIP-MCNC: NEGATIVE MG/DL
LEUKOCYTE ESTERASE UR QL STRIP: ABNORMAL
LYMPHOCYTES NFR BLD: 0.91 K/UL (ref 1–4.8)
LYMPHOCYTES RELATIVE PERCENT: 6 % (ref 24–44)
MCH RBC QN AUTO: 31 PG (ref 25.2–33.5)
MCHC RBC AUTO-ENTMCNC: 32.4 G/DL (ref 28.4–34.8)
MCV RBC AUTO: 95.7 FL (ref 82.6–102.9)
MONOCYTES NFR BLD: 16 % (ref 1–7)
MONOCYTES NFR BLD: 2.42 K/UL (ref 0.2–0.8)
MORPHOLOGY: ABNORMAL
NEUTROPHILS NFR BLD: 78 % (ref 36–66)
NEUTS SEG NFR BLD: 11.77 K/UL (ref 1.8–7.7)
NITRITE UR QL STRIP: NEGATIVE
NRBC BLD-RTO: 0 PER 100 WBC
PH UR STRIP: 5 [PH] (ref 5–8)
PLATELET # BLD AUTO: 76 K/UL (ref 138–453)
PMV BLD AUTO: 13.2 FL (ref 8.1–13.5)
POTASSIUM SERPL-SCNC: 3.7 MMOL/L (ref 3.7–5.3)
PROT UR STRIP-MCNC: ABNORMAL MG/DL
RBC # BLD AUTO: 3.45 M/UL (ref 3.95–5.11)
RBC #/AREA URNS HPF: ABNORMAL /HPF (ref 0–2)
SODIUM SERPL-SCNC: 128 MMOL/L (ref 135–144)
SODIUM UR-SCNC: 59 MMOL/L
SP GR UR STRIP: 1.03 (ref 1–1.03)
TOTAL PROTEIN, URINE: 264 MG/DL
URIC ACID, UR: 21.1 MG/DL
UROBILINOGEN UR STRIP-ACNC: ABNORMAL EU/DL (ref 0–1)
WBC #/AREA URNS HPF: ABNORMAL /HPF (ref 0–5)
WBC OTHER # BLD: 15.1 K/UL (ref 3.5–11.3)

## 2024-04-03 PROCEDURE — 2580000003 HC RX 258: Performed by: NURSE ANESTHETIST, CERTIFIED REGISTERED

## 2024-04-03 PROCEDURE — 6360000002 HC RX W HCPCS: Performed by: NURSE ANESTHETIST, CERTIFIED REGISTERED

## 2024-04-03 PROCEDURE — 2580000003 HC RX 258: Performed by: INTERNAL MEDICINE

## 2024-04-03 PROCEDURE — 7100000000 HC PACU RECOVERY - FIRST 15 MIN: Performed by: UROLOGY

## 2024-04-03 PROCEDURE — C2617 STENT, NON-COR, TEM W/O DEL: HCPCS | Performed by: UROLOGY

## 2024-04-03 PROCEDURE — 2580000003 HC RX 258: Performed by: UROLOGY

## 2024-04-03 PROCEDURE — 2720000010 HC SURG SUPPLY STERILE: Performed by: UROLOGY

## 2024-04-03 PROCEDURE — C9113 INJ PANTOPRAZOLE SODIUM, VIA: HCPCS | Performed by: INTERNAL MEDICINE

## 2024-04-03 PROCEDURE — 82365 CALCULUS SPECTROSCOPY: CPT

## 2024-04-03 PROCEDURE — 6370000000 HC RX 637 (ALT 250 FOR IP): Performed by: UROLOGY

## 2024-04-03 PROCEDURE — 7100000001 HC PACU RECOVERY - ADDTL 15 MIN: Performed by: UROLOGY

## 2024-04-03 PROCEDURE — 3600000012 HC SURGERY LEVEL 2 ADDTL 15MIN: Performed by: UROLOGY

## 2024-04-03 PROCEDURE — 94760 N-INVAS EAR/PLS OXIMETRY 1: CPT

## 2024-04-03 PROCEDURE — 82947 ASSAY GLUCOSE BLOOD QUANT: CPT

## 2024-04-03 PROCEDURE — 87186 SC STD MICRODIL/AGAR DIL: CPT

## 2024-04-03 PROCEDURE — 3700000001 HC ADD 15 MINUTES (ANESTHESIA): Performed by: UROLOGY

## 2024-04-03 PROCEDURE — 36415 COLL VENOUS BLD VENIPUNCTURE: CPT

## 2024-04-03 PROCEDURE — 99232 SBSQ HOSP IP/OBS MODERATE 35: CPT | Performed by: FAMILY MEDICINE

## 2024-04-03 PROCEDURE — 85025 COMPLETE CBC W/AUTO DIFF WBC: CPT

## 2024-04-03 PROCEDURE — C9113 INJ PANTOPRAZOLE SODIUM, VIA: HCPCS | Performed by: UROLOGY

## 2024-04-03 PROCEDURE — 87040 BLOOD CULTURE FOR BACTERIA: CPT

## 2024-04-03 PROCEDURE — 3600000002 HC SURGERY LEVEL 2 BASE: Performed by: UROLOGY

## 2024-04-03 PROCEDURE — 3700000000 HC ANESTHESIA ATTENDED CARE: Performed by: UROLOGY

## 2024-04-03 PROCEDURE — 6360000002 HC RX W HCPCS: Performed by: INTERNAL MEDICINE

## 2024-04-03 PROCEDURE — 1200000000 HC SEMI PRIVATE

## 2024-04-03 PROCEDURE — 2700000000 HC OXYGEN THERAPY PER DAY

## 2024-04-03 PROCEDURE — A4216 STERILE WATER/SALINE, 10 ML: HCPCS | Performed by: INTERNAL MEDICINE

## 2024-04-03 PROCEDURE — 87086 URINE CULTURE/COLONY COUNT: CPT

## 2024-04-03 PROCEDURE — 6360000002 HC RX W HCPCS: Performed by: UROLOGY

## 2024-04-03 PROCEDURE — 80048 BASIC METABOLIC PNL TOTAL CA: CPT

## 2024-04-03 PROCEDURE — A4216 STERILE WATER/SALINE, 10 ML: HCPCS | Performed by: UROLOGY

## 2024-04-03 PROCEDURE — 0TC68ZZ EXTIRPATION OF MATTER FROM RIGHT URETER, VIA NATURAL OR ARTIFICIAL OPENING ENDOSCOPIC: ICD-10-PCS | Performed by: UROLOGY

## 2024-04-03 PROCEDURE — 2500000003 HC RX 250 WO HCPCS: Performed by: NURSE ANESTHETIST, CERTIFIED REGISTERED

## 2024-04-03 PROCEDURE — C1758 CATHETER, URETERAL: HCPCS | Performed by: UROLOGY

## 2024-04-03 PROCEDURE — 0T768DZ DILATION OF RIGHT URETER WITH INTRALUMINAL DEVICE, VIA NATURAL OR ARTIFICIAL OPENING ENDOSCOPIC: ICD-10-PCS | Performed by: UROLOGY

## 2024-04-03 PROCEDURE — 51798 US URINE CAPACITY MEASURE: CPT

## 2024-04-03 PROCEDURE — 2709999900 HC NON-CHARGEABLE SUPPLY: Performed by: UROLOGY

## 2024-04-03 PROCEDURE — 6370000000 HC RX 637 (ALT 250 FOR IP): Performed by: INTERNAL MEDICINE

## 2024-04-03 PROCEDURE — 87088 URINE BACTERIA CULTURE: CPT

## 2024-04-03 DEVICE — URETERAL STENT
Type: IMPLANTABLE DEVICE | Site: URETER | Status: FUNCTIONAL
Brand: POLARIS™ ULTRA

## 2024-04-03 RX ORDER — SODIUM CHLORIDE 0.9 % (FLUSH) 0.9 %
5-40 SYRINGE (ML) INJECTION EVERY 12 HOURS SCHEDULED
Status: DISCONTINUED | OUTPATIENT
Start: 2024-04-03 | End: 2024-04-03 | Stop reason: HOSPADM

## 2024-04-03 RX ORDER — ASPIRIN 81 MG/1
81 TABLET ORAL DAILY
Status: DISCONTINUED | OUTPATIENT
Start: 2024-04-04 | End: 2024-04-06 | Stop reason: HOSPADM

## 2024-04-03 RX ORDER — SODIUM CHLORIDE 9 MG/ML
INJECTION, SOLUTION INTRAVENOUS PRN
Status: DISCONTINUED | OUTPATIENT
Start: 2024-04-03 | End: 2024-04-03 | Stop reason: HOSPADM

## 2024-04-03 RX ORDER — SODIUM CHLORIDE 0.9 % (FLUSH) 0.9 %
5-40 SYRINGE (ML) INJECTION PRN
Status: DISCONTINUED | OUTPATIENT
Start: 2024-04-03 | End: 2024-04-03 | Stop reason: HOSPADM

## 2024-04-03 RX ORDER — OXYBUTYNIN CHLORIDE 5 MG/1
5 TABLET ORAL 3 TIMES DAILY PRN
Status: DISCONTINUED | OUTPATIENT
Start: 2024-04-03 | End: 2024-04-06 | Stop reason: HOSPADM

## 2024-04-03 RX ORDER — PROPOFOL 10 MG/ML
INJECTION, EMULSION INTRAVENOUS CONTINUOUS PRN
Status: DISCONTINUED | OUTPATIENT
Start: 2024-04-03 | End: 2024-04-03 | Stop reason: SDUPTHER

## 2024-04-03 RX ORDER — NALOXONE HYDROCHLORIDE 0.4 MG/ML
INJECTION, SOLUTION INTRAMUSCULAR; INTRAVENOUS; SUBCUTANEOUS PRN
Status: DISCONTINUED | OUTPATIENT
Start: 2024-04-03 | End: 2024-04-03 | Stop reason: HOSPADM

## 2024-04-03 RX ORDER — FENTANYL CITRATE 50 UG/ML
INJECTION, SOLUTION INTRAMUSCULAR; INTRAVENOUS PRN
Status: DISCONTINUED | OUTPATIENT
Start: 2024-04-03 | End: 2024-04-03 | Stop reason: SDUPTHER

## 2024-04-03 RX ORDER — ALBUTEROL SULFATE 90 UG/1
2 AEROSOL, METERED RESPIRATORY (INHALATION) EVERY 6 HOURS PRN
Status: DISCONTINUED | OUTPATIENT
Start: 2024-04-03 | End: 2024-04-03

## 2024-04-03 RX ORDER — ALBUTEROL SULFATE 90 UG/1
2 AEROSOL, METERED RESPIRATORY (INHALATION) EVERY 4 HOURS PRN
Status: DISCONTINUED | OUTPATIENT
Start: 2024-04-03 | End: 2024-04-05

## 2024-04-03 RX ORDER — ONDANSETRON 2 MG/ML
4 INJECTION INTRAMUSCULAR; INTRAVENOUS
Status: DISCONTINUED | OUTPATIENT
Start: 2024-04-03 | End: 2024-04-03 | Stop reason: HOSPADM

## 2024-04-03 RX ORDER — INSULIN LISPRO 100 [IU]/ML
0-4 INJECTION, SOLUTION INTRAVENOUS; SUBCUTANEOUS NIGHTLY
Status: DISCONTINUED | OUTPATIENT
Start: 2024-04-03 | End: 2024-04-04

## 2024-04-03 RX ORDER — SODIUM CHLORIDE 9 MG/ML
INJECTION, SOLUTION INTRAVENOUS CONTINUOUS PRN
Status: DISCONTINUED | OUTPATIENT
Start: 2024-04-03 | End: 2024-04-03 | Stop reason: SDUPTHER

## 2024-04-03 RX ORDER — MAGNESIUM HYDROXIDE 1200 MG/15ML
LIQUID ORAL CONTINUOUS PRN
Status: COMPLETED | OUTPATIENT
Start: 2024-04-03 | End: 2024-04-03

## 2024-04-03 RX ORDER — MORPHINE SULFATE 4 MG/ML
4 INJECTION, SOLUTION INTRAMUSCULAR; INTRAVENOUS
Status: DISCONTINUED | OUTPATIENT
Start: 2024-04-03 | End: 2024-04-03

## 2024-04-03 RX ORDER — HYDROMORPHONE HYDROCHLORIDE 1 MG/ML
0.5 INJECTION, SOLUTION INTRAMUSCULAR; INTRAVENOUS; SUBCUTANEOUS EVERY 5 MIN PRN
Status: DISCONTINUED | OUTPATIENT
Start: 2024-04-03 | End: 2024-04-03 | Stop reason: HOSPADM

## 2024-04-03 RX ORDER — ATORVASTATIN CALCIUM 80 MG/1
80 TABLET, FILM COATED ORAL DAILY
Status: DISCONTINUED | OUTPATIENT
Start: 2024-04-03 | End: 2024-04-06 | Stop reason: HOSPADM

## 2024-04-03 RX ORDER — INSULIN LISPRO 100 [IU]/ML
0-8 INJECTION, SOLUTION INTRAVENOUS; SUBCUTANEOUS
Status: DISCONTINUED | OUTPATIENT
Start: 2024-04-03 | End: 2024-04-04

## 2024-04-03 RX ORDER — FENTANYL CITRATE 50 UG/ML
25 INJECTION, SOLUTION INTRAMUSCULAR; INTRAVENOUS EVERY 5 MIN PRN
Status: DISCONTINUED | OUTPATIENT
Start: 2024-04-03 | End: 2024-04-03 | Stop reason: HOSPADM

## 2024-04-03 RX ORDER — LIDOCAINE HYDROCHLORIDE 20 MG/ML
INJECTION, SOLUTION EPIDURAL; INFILTRATION; INTRACAUDAL; PERINEURAL PRN
Status: DISCONTINUED | OUTPATIENT
Start: 2024-04-03 | End: 2024-04-03 | Stop reason: SDUPTHER

## 2024-04-03 RX ORDER — ONDANSETRON 2 MG/ML
4 INJECTION INTRAMUSCULAR; INTRAVENOUS EVERY 6 HOURS PRN
Status: DISCONTINUED | OUTPATIENT
Start: 2024-04-03 | End: 2024-04-03 | Stop reason: SDUPTHER

## 2024-04-03 RX ORDER — DEXTROSE MONOHYDRATE 100 MG/ML
INJECTION, SOLUTION INTRAVENOUS CONTINUOUS PRN
Status: DISCONTINUED | OUTPATIENT
Start: 2024-04-03 | End: 2024-04-06 | Stop reason: HOSPADM

## 2024-04-03 RX ADMIN — FENTANYL CITRATE 25 MCG: 50 INJECTION INTRAMUSCULAR; INTRAVENOUS at 13:23

## 2024-04-03 RX ADMIN — SODIUM CHLORIDE: 9 INJECTION, SOLUTION INTRAVENOUS at 13:02

## 2024-04-03 RX ADMIN — PANTOPRAZOLE SODIUM 40 MG: 40 INJECTION, POWDER, FOR SOLUTION INTRAVENOUS at 00:23

## 2024-04-03 RX ADMIN — INSULIN LISPRO 2 UNITS: 100 INJECTION, SOLUTION INTRAVENOUS; SUBCUTANEOUS at 18:07

## 2024-04-03 RX ADMIN — PANTOPRAZOLE SODIUM 40 MG: 40 INJECTION, POWDER, FOR SOLUTION INTRAVENOUS at 22:14

## 2024-04-03 RX ADMIN — MORPHINE SULFATE 2 MG: 2 INJECTION, SOLUTION INTRAMUSCULAR; INTRAVENOUS at 03:11

## 2024-04-03 RX ADMIN — ATORVASTATIN CALCIUM 80 MG: 80 TABLET, FILM COATED ORAL at 08:19

## 2024-04-03 RX ADMIN — PROPOFOL 75 MCG/KG/MIN: 10 INJECTION, EMULSION INTRAVENOUS at 13:08

## 2024-04-03 RX ADMIN — SODIUM CHLORIDE: 9 INJECTION, SOLUTION INTRAVENOUS at 06:40

## 2024-04-03 RX ADMIN — LIDOCAINE HYDROCHLORIDE 60 MG: 20 INJECTION, SOLUTION EPIDURAL; INFILTRATION; INTRACAUDAL; PERINEURAL at 13:08

## 2024-04-03 RX ADMIN — METOPROLOL TARTRATE 25 MG: 25 TABLET, FILM COATED ORAL at 08:19

## 2024-04-03 RX ADMIN — FENTANYL CITRATE 50 MCG: 50 INJECTION INTRAMUSCULAR; INTRAVENOUS at 13:05

## 2024-04-03 RX ADMIN — SODIUM CHLORIDE, PRESERVATIVE FREE 10 ML: 5 INJECTION INTRAVENOUS at 08:19

## 2024-04-03 RX ADMIN — WATER 1000 MG: 1 INJECTION INTRAMUSCULAR; INTRAVENOUS; SUBCUTANEOUS at 22:14

## 2024-04-03 RX ADMIN — FENTANYL CITRATE 50 MCG: 50 INJECTION INTRAMUSCULAR; INTRAVENOUS at 13:09

## 2024-04-03 ASSESSMENT — PAIN - FUNCTIONAL ASSESSMENT
PAIN_FUNCTIONAL_ASSESSMENT: PREVENTS OR INTERFERES SOME ACTIVE ACTIVITIES AND ADLS
PAIN_FUNCTIONAL_ASSESSMENT: FACE, LEGS, ACTIVITY, CRY, AND CONSOLABILITY (FLACC)
PAIN_FUNCTIONAL_ASSESSMENT: PREVENTS OR INTERFERES SOME ACTIVE ACTIVITIES AND ADLS

## 2024-04-03 ASSESSMENT — PAIN DESCRIPTION - ONSET
ONSET: ON-GOING
ONSET: ON-GOING

## 2024-04-03 ASSESSMENT — PAIN DESCRIPTION - PAIN TYPE
TYPE: ACUTE PAIN
TYPE: ACUTE PAIN

## 2024-04-03 ASSESSMENT — ENCOUNTER SYMPTOMS
SHORTNESS OF BREATH: 0
CHEST TIGHTNESS: 0
WHEEZING: 0
COUGH: 0
RHINORRHEA: 0
VOMITING: 0
DIARRHEA: 0
NAUSEA: 0
BLOOD IN STOOL: 0
ABDOMINAL PAIN: 0

## 2024-04-03 ASSESSMENT — PAIN DESCRIPTION - ORIENTATION
ORIENTATION: RIGHT;LOWER
ORIENTATION: RIGHT;LOWER

## 2024-04-03 ASSESSMENT — PAIN DESCRIPTION - DESCRIPTORS
DESCRIPTORS: SHOOTING;STABBING
DESCRIPTORS: STABBING;SHOOTING

## 2024-04-03 ASSESSMENT — PAIN SCALES - GENERAL
PAINLEVEL_OUTOF10: 0
PAINLEVEL_OUTOF10: 4
PAINLEVEL_OUTOF10: 6

## 2024-04-03 ASSESSMENT — PAIN DESCRIPTION - FREQUENCY
FREQUENCY: INTERMITTENT
FREQUENCY: CONTINUOUS

## 2024-04-03 ASSESSMENT — PAIN DESCRIPTION - LOCATION
LOCATION: ABDOMEN
LOCATION: ABDOMEN

## 2024-04-03 NOTE — PROGRESS NOTES
Pt admitted to room 1003 from ED. She is A/O x4, on 2L nasal canula due to desaturation in ED and NSR on monitor. Vitals stable and pt states that pain level is comfortable. Telemetry applied, bed alarm on with bed in lowest position. Admission database and med rec complete. Call light is within reach. All need met at this time, safety maintained.

## 2024-04-03 NOTE — OP NOTE
Operative Note      Patient: Verena Elliott  YOB: 1955  MRN: [id number]    Date of Procedure: 4/3/2024    Pre-Op Diagnosis Codes:     * Ureteral calculus [N20.1]    Post-Op Diagnosis: Same       Procedure(s):  CYSTOSCOPY, right ureteroscopy, holmium laser lithotripsy, right ureteral stone basketing, right stent placement.    Surgeon(s):  Hunter Park MD    Assistant:   * No surgical staff found *    Anesthesia: MAC    Estimated Blood Loss (mL): Minimal    Complications: None    Specimens:   ID Type Source Tests Collected by Time Destination   1 : RIGHT URETERAL STONE Stone (Calculus) Ureter STONE ANALYSIS Hunter Park MD 4/3/2024 1333        Implants:  Implant Name Type Inv. Item Serial No.  Lot No. LRB No. Used Action   STENT URET 6FR L26CM PERCFLX HYDR+ DBL PGTL THRD 2 - [id number]  STENT URET 6FR L26CM PERCFLX HYDR+ DBL PGTL THRD 2  [facility]- [id number] Right 1 Implanted         Drains: * No LDAs found *    Findings:  Infection Present At Time Of Surgery (PATOS) (choose all levels that have infection present):  No infection present  Other Findings: Right distal ureteral stone    Detailed Description of Procedure:   She was given monitored anesthesia care and put in dorsolithotomy position.  Her genitals were prepped in usual sterile fashion.  22 Barbadian 30 degree cystoscope was passed into the bladder.  035 Glidewire was placed into the right ureter.  The rigid ureteroscope was advanced into the right ureter.  The stone was identified.  The 200 µm homing laser fiber was inserted.  Setting was 6.4 W.  The stone was ablated.  Laser time was 13 seconds.  The N [place] basket was then used to remove the ureteral stones and they were sent to pathology.  A 6 Barbadian by 26 cm stent was placed.  The string was secured to the right inner thigh with benzoin and Steri-Strips.  She tolerated it well.  She may be discharged and follow-up next Monday for stent

## 2024-04-03 NOTE — ANESTHESIA POSTPROCEDURE EVALUATION
Department of Anesthesiology  Postprocedure Note    Patient: Verena Elliott  MRN: [id number]  YOB: 1955  Date of evaluation: 4/3/2024    Procedure Summary       Date: 04/03/24 Room / Location: [facility]    Anesthesia Start: 1302 Anesthesia Stop: 1345    Procedure: CYSTOSCOPY , RIGHT URETERAL STENT INSERTION, LASER LITHROTRIPSY, URETEROSCOPY, STONE BASKETING (Right: Ureter) Diagnosis:       Ureteral calculus      (Ureteral calculus [N20.1])    Surgeons: Hunter Park MD Responsible Provider: Teri Dia MD    Anesthesia Type: MAC ASA Status: 3            Anesthesia Type: No value filed.    Nehemias Phase I: Nehemias Score: 8    Nehemias Phase II:      Anesthesia Post Evaluation    Patient location during evaluation: PACU  Patient participation: complete - patient participated  Level of consciousness: awake and alert  Airway patency: patent  Nausea & Vomiting: no nausea and no vomiting  Cardiovascular status: hemodynamically stable  Respiratory status: acceptable  Hydration status: euvolemic  Pain management: adequate    No notable events documented.

## 2024-04-03 NOTE — PROGRESS NOTES
Writer reached out to on call NP due to pt receiving one time dose of rocephin for UTI in ED but not having any further antibiotics ordered, orders for antibiotics have been placed. In addition, pt states that she takes 60units of Novolin every morning, ACHS and sliding scale orders have been placed. See orders for details.

## 2024-04-03 NOTE — PLAN OF CARE
Pt admitted for UTI, GRACIELA and kidney stone.   CT of abd showed right hydroureteronephrosis with 6mm ureter calculus as will as left non obstructive renal calculus.   Urology consulted.  IVF initiated, 100mL/hr of normal saline. See MAR.   IV morphine for pain, vitals remain stable.   She remains A/O x4, NSR on monitor and on 2L nasal canula for episode of desaturation with exertion. Bed side pulse ox in place.   Free from falls.   NPO since midnight.  All needs met at this time    Problem: Discharge Planning  Goal: Discharge to home or other facility with appropriate resources  4/3/2024 0238 by Rosemary Mooney RN  Outcome: Progressing  4/3/2024 0238 by Rosemary Mooney RN  Outcome: Progressing  4/3/2024 0237 by Rosemary Mooney RN  Outcome: Progressing  Flowsheets (Taken 4/2/2024 2332)  Discharge to home or other facility with appropriate resources: Identify barriers to discharge with patient and caregiver     Problem: Pain  Goal: Verbalizes/displays adequate comfort level or baseline comfort level  4/3/2024 0238 by Rosemary Mooney RN  Outcome: Progressing  4/3/2024 0238 by Rosemary Mooney RN  Outcome: Progressing  4/3/2024 0237 by Rosemary Mooney RN  Outcome: Progressing     Problem: Skin/Tissue Integrity  Goal: Absence of new skin breakdown  Description: 1.  Monitor for areas of redness and/or skin breakdown  2.  Assess vascular access sites hourly  3.  Every 4-6 hours minimum:  Change oxygen saturation probe site  4.  Every 4-6 hours:  If on nasal continuous positive airway pressure, respiratory therapy assess nares and determine need for appliance change or resting period.  4/3/2024 0238 by Rosemary Mooney RN  Outcome: Progressing  4/3/2024 0238 by Rosemary Mooney RN  Outcome: Progressing  4/3/2024 0237 by Rosemary Mooney RN  Outcome: Progressing     Problem: Safety - Adult  Goal: Free from fall injury  4/3/2024 0238 by Rosemary Mooney RN  Outcome: Progressing  4/3/2024 0238 by Rosemary Mooney RN  Outcome: Progressing  4/3/2024 0237 by

## 2024-04-03 NOTE — PROGRESS NOTES
Report called to santos perez. Pt transported per cart to room 2 assist to get to bed. Call light provided. Santos perez notified of patient arrival

## 2024-04-03 NOTE — PROGRESS NOTES
Occupational Therapy  DATE: 4/3/2024    NAME: Verena Elliott  MRN: [id number]   : 1955    Patient not seen this date for Occupational Therapy due to:      [] Cancel by RN or physician due to:    [] Hemodialysis    [] Critical Lab Value Level     [] Blood transfusion in progress    [] Acute or unstable cardiovascular status   _MAP < 55 or more than >115  _HR < 40 or > 130    [] Acute or unstable pulmonary status   -FiO2 > 60%   _RR < 5 or >40    _O2 sats < 85%    [] Strict Bedrest    [x] Off Unit for surgery or procedure: Per RN, pt currently off unit for cystoscopy. OT will continue to follow/ck back following procedure as time allows.     [] Off Unit for testing       [] Pending imaging to R/O fracture    [] Refusal by Patient      [] Other      [] OT being discontinued at this time. Patient independent. No further needs.     [] OT being discontinued at this time as the patient has been transferred to hospice care. No further needs.      Rina Lieberman, OT

## 2024-04-03 NOTE — PROGRESS NOTES
[x] Medication Reconciliation was completed and the patient's home medication list was verified. The Med List Status is \"Complete\". The following sources were used to assist with Medication Reconciliation:    [x] Patient had a list of medications which was transcribed into the EHR.    [] Patient provided bottles of their medications    [x] Home medications reviewed and confirmed with patient    [] Contacted patient's pharmacy to confirm home medications    [] Contacted patient's physician office to confirm home medications    [] Medical Records from another facility and/or Care Everywhere were reviewed

## 2024-04-03 NOTE — CARE COORDINATION
Case Management Assessment  Initial Evaluation    Date/Time of Evaluation: 4/3/2024 9:25 AM  Assessment Completed by: JAMILAH JUDD RN    If patient is discharged prior to next notation, then this note serves as note for discharge by case management.    Patient Name: Verena Elliott                   YOB: 1955  Diagnosis: Kidney stone [N20.0]  GRACIELA (acute kidney injury) (HCC) [N17.9]  Urinary tract infection without hematuria, site unspecified [N39.0]  Hydronephrosis concurrent with and due to calculi of kidney and ureter [N13.2]                   Date / Time: 4/2/2024  6:42 PM    Patient Admission Status: Inpatient   Readmission Risk (Low < 19, Mod (19-27), High > 27): Readmission Risk Score: 16.3    Current PCP: Donal Cook, DO  PCP verified by CM? Yes (virtual visit 3/28)    Chart Reviewed: Yes      History Provided by: Patient  Patient Orientation: Alert and Oriented    Patient Cognition: Alert    Hospitalization in the last 30 days (Readmission):  No    If yes, Readmission Assessment in CM Navigator will be completed.    Advance Directives:      Code Status: Full Code   Patient's Primary Decision Maker is: Legal Next of Kin    Primary Decision Maker: Celia Elliott - Child - [phone]    Primary Decision Maker: Patrice Elliott - Child - [phone]    Discharge Planning:    Patient lives with: Children (son Patrice) Type of Home: House  Primary Care Giver: Self  Patient Support Systems include: Children   Current Financial resources: Medicare  Current community resources: None  Current services prior to admission: None            Current DME:              Type of Home Care services:  None    ADLS  Prior functional level: Independent in ADLs/IADLs  Current functional level: Independent in ADLs/IADLs    PT AM-PAC:   /24  OT AM-PAC:   /24    Family can provide assistance at DC: Yes  Would you like Case Management to discuss the discharge plan with any other family members/significant  others, and if so, who? No  Plans to Return to Present Housing: Yes  Other Identified Issues/Barriers to RETURNING to current housing: none   Potential Assistance needed at discharge: N/A            Potential DME:    Patient expects to discharge to: House  Plan for transportation at discharge: Self    Financial    Payor: ADVANTAGE [facility] PLAN / Plan: ADVANTAGE [facility] REPLACEMENT / Product Type: *No Product type* /     Does insurance require precert for SNF: Yes    Potential assistance Purchasing Medications: No  Meds-to-Beds request: Yes      [facility] - [place], [place] - [address] [facility] - P [phone] - F [phone]  [address] [facility] [address]  Phone: [phone] Fax: [phone]    [id number] - [place], [place] - [address] - P [phone] - F [phone]  [address]  [place] [address]  Phone: [phone] Fax: [phone]    [id number] - [place], [place] - [address] - P [phone] - F [phone]  [address]  [place] [address]  Phone: [phone] Fax: [phone]    [facility] - [place], [place] - [address] -  [phone] - F [phone]  [address]  [facility] [address]  Phone: [phone] Fax: [phone]      Notes:    Factors facilitating achievement of predicted outcomes: Family support, Cooperative, and Pleasant    Barriers to discharge: none    Additional Case Management Notes:   Patient lives with son Patrice in 1 story home with 2 steps to enter the Front door. She is independent. Drives. No DME in the home    Admitted for GRACIELA and UTI. GRACIELA due to hydronephrosis due to kidney stone. Urology rounded this am and plan is for cysto at 1500 today. Creatine 2.8 on admission    No anticipated discharge needs but will continue to follow work up    The Plan for Transition of Care is related to the following treatment goals of Kidney stone [N20.0]  GRACIELA (acute

## 2024-04-03 NOTE — CONSULTS
negative  Cardiovascular: negative  History of arteriosclerotic heart disease  Gastrointestinal: Irritable bowel disease Crohn's  Genitourinary: see HPI  Musculoskeletal: negative body ache muscle ache  Skin: negative   Neurological: negative  Hematological/Lymphatic: negative  Psychological: negative    Physical Exam:      This a 68 y.o. female   Patient Vitals for the past 24 hrs:   BP Temp Temp src Pulse Resp SpO2 Weight   04/03/24 0514 -- -- -- 88 16 94 % --   04/03/24 0321 (!) 108/58 97.9 °F (36.6 °C) Oral 89 19 90 % --   04/02/24 2331 (!) 103/58 98 °F (36.7 °C) Oral 90 20 93 % --   04/02/24 2230 106/60 -- -- 89 24 95 % --   04/02/24 2215 -- -- -- -- -- -- 98 kg (216 lb 0.8 oz)   04/02/24 2015 106/60 -- -- 88 26 95 % --   04/02/24 2000 106/61 -- -- 90 21 95 % --   04/02/24 1930 105/60 -- -- 89 26 95 % --   04/02/24 1728 (!) 107/58 97.5 °F (36.4 °C) Oral 91 18 95 % --     Constitutional: Patient in no acute distress.  Neuro: Alert and oriented to person, place and time.  Psych: mood and affect normal  HEENT negative  Lungs: Respiratory effort is normal  Cardiovascular: Normal peripheral pulses  Abdomen: Soft, right lower quadrant pain and tenderness associated with distal ureteral calculus, left kidneys normal.  Lymphatics: No palpable lymphadenopathy.  Bladder non-tender and not distended.  Pelvic exam: deferred  Rectal exam not indicated    LABS:   Recent Labs     04/02/24  1915 04/03/24  0520   WBC 14.3* 15.1*   HGB 12.1 10.7*   HCT 37.7 33.0*   MCV 97.2 95.7   PLT 70* 76*     Recent Labs     04/02/24 2020   *   K 3.9   CL 96*   CO2 22   BUN 70*   CREATININE 2.8*       Additional Lab/culture results:    Urinalysis:   Recent Labs     04/02/24 2120   COLORU Yellow   PHUR 5.0   WBCUA TOO NUMEROUS TO COUNT   RBCUA 10 TO 20   BACTERIA FEW*   SPECGRAV 1.027   LEUKOCYTESUR LARGE*   UROBILINOGEN ELEVATED   BILIRUBINUR Presumptive positive. Unable to confirm due to unavailability of reagent.*

## 2024-04-03 NOTE — PROGRESS NOTES
[facility]  Office: [phone]  Bryan Bello DO, Volodymyr Chua DO, Chevy Hamilton DO, Wilfred Johnson, DO, Chaparro Mitchell MD, Shavonne Pal MD, Jun Chinchilla MD, Tameka Martines MD,  Kumar Beach MD, Neto Huggins MD, Ángel Villarreal MD,  Joseph Chapman DO, Nick Adorno MD, Brary Maldonado MD, Vivek Bello DO, Vonda Denis MD,  Enrique Poon DO, Theodora Kendall MD, Miranda Alberts MD, Treva Baltazar MD, John Vargas MD,  Raúl Lorenzo MD, Richard Cheatham MD, Allen Holland MD, Jian Viera MD, Moses Fallon MD, Lillian Alatorre MD, Leonidas Gee DO, Ming Khanna DO, Stewart Ortiz MD,  Tyrone Ny MD, Shirley Waterhouse, CNP,  Anali Howard CNP, Miki Rogers, CNP,  Latisha Shaw, MCKENZIE, Pura Braga, CNP, Suzy Fry, CNP, Tiffani Watt CNP, Arabella Esposito, CNP, Dawna Rosas, PA-C, Billie Diop PA-C, Romy Bae, CNP, Kat Hayes, CNP, Marcia Mike, CNP, Yesica Hatch, CNP, Demetra Wyman, CNP, Jennifer Dan, CNS, Madelaine Pinto, CNP, Ambreen Sheppard CNP, Tracy Schwab, CNP       [facility]      Daily Progress Note     Admit Date: 4/2/2024  Bed/Room No.  1003/1003-02  Admitting Physician : Jun Chinchilla MD  Code Status :Full Code  Hospital Day:  LOS: 1 day   Chief Complaint:     Chief Complaint   Patient presents with    Generalized Body Aches     Pt reports generalized body aches since Thursday night. Reports cough    Abdominal Pain     RLQ abdominal pain with nausea     Principal Problem:    Hydronephrosis concurrent with and due to calculi of kidney and ureter  Resolved Problems:    * No resolved hospital problems. *    Subjective :        Interval History/Significant events :  04/03/24    Patient is lethargic.  She has right flank pain.  Pain is controlled medication.  Patient feels warm.  She has generalized fatigue, decreased appetite.  She denies any nausea, vomiting.  Vitals - Unstable afebrile  Labs -elevated creatinine 2.8,

## 2024-04-03 NOTE — PLAN OF CARE
Problem: Discharge Planning  Goal: Discharge to home or other facility with appropriate resources  4/3/2024 1007 by Vivek Fishman, RN  Outcome: Progressing  Flowsheets (Taken 4/3/2024 1007)  Discharge to home or other facility with appropriate resources: Identify barriers to discharge with patient and caregiver     Problem: Pain  Goal: Verbalizes/displays adequate comfort level or baseline comfort level  4/3/2024 1007 by Vivek Fishman, RN  Outcome: Progressing  Flowsheets  Taken 4/3/2024 1007 by Vivek Fishman, RN  Verbalizes/displays adequate comfort level or baseline comfort level: Encourage patient to monitor pain and request assistance  Taken 4/3/2024 0321 by Rosemary Mooney RN  Verbalizes/displays adequate comfort level or baseline comfort level: Encourage patient to monitor pain and request assistance     Problem: Skin/Tissue Integrity  Goal: Absence of new skin breakdown  Description: 1.  Monitor for areas of redness and/or skin breakdown  2.  Assess vascular access sites hourly  3.  Every 4-6 hours minimum:  Change oxygen saturation probe site  4.  Every 4-6 hours:  If on nasal continuous positive airway pressure, respiratory therapy assess nares and determine need for appliance change or resting period.  4/3/2024 1007 by Vivek Fishman, RN  Outcome: Progressing     Problem: Safety - Adult  Goal: Free from fall injury  4/3/2024 1007 by Vivek Fishman, RN  Outcome: Progressing  Flowsheets (Taken 4/3/2024 1007)  Free From Fall Injury: Instruct family/caregiver on patient safety

## 2024-04-03 NOTE — ANESTHESIA PRE PROCEDURE
mild left ventricular hypertrophy.  Global left ventricular systolic function is normal Estimated ejection  fraction is > 55%.  Aortic atherosclerotic plaque seen.  Trivial tricuspid regurgitation.  Anterior echo free space suggestive of adipose tissue is seen.       Neuro/Psych:   Negative Neuro/Psych ROS              GI/Hepatic/Renal:   (+) renal disease: kidney stones          Endo/Other:    (+) Diabetes, blood dyscrasia: anemia and thrombocytopenia, arthritis: OA..                 Abdominal:             Vascular: negative vascular ROS.         Other Findings:         Anesthesia Plan      MAC     ASA 3       Induction: intravenous.    MIPS: Postoperative opioids intended and Prophylactic antiemetics administered.  Anesthetic plan and risks discussed with patient.                    GARRY MORAN MD   4/3/2024

## 2024-04-03 NOTE — PROGRESS NOTES
Physical Therapy  DATE: 4/3/2024    NAME: Verena Elliott  MRN: [id number]   : 1955    Patient not seen this date for Physical Therapy due to:      [] Cancel by RN or physician due to:    [] Hemodialysis    [] Critical Lab Value Level     [] Blood transfusion in progress    [] Acute or unstable cardiovascular status   _MAP < 55 or more than >115  _HR < 40 or > 130    [] Acute or unstable pulmonary status   -FiO2 > 60%   _RR < 5 or >40    _O2 sats < 85%    [] Strict Bedrest    [x] Off Unit for surgery or procedure(gone for cystoscopy since 1030AM)    [] Off Unit for testing       [] Pending imaging to R/O fracture    [] Refusal by Patient      [] Other      [] PT being discontinued at this time. Patient independent. No further needs.     [] PT being discontinued at this time as the patient has been transferred to hospice care. No further needs.      AGUS WALLER, PT

## 2024-04-03 NOTE — RT PROTOCOL NOTE
RT Inhaler-Nebulizer Bronchodilator Protocol Note    There is a bronchodilator order in the chart from a provider indicating to follow the RT Bronchodilator Protocol and there is an “Initiate RT Inhaler-Nebulizer Bronchodilator Protocol” order as well (see protocol at bottom of note).    CXR Findings:  XR CHEST PORTABLE    Result Date: 4/3/2024  Ill-defined patchy and streaky opacities in the left lower lung field in the area of the lingula, likely due to combination of pre-existing fibrosis/atelectasis and the lingula of left lung and left cardiophrenic fat pad as also noted on previous CT scan. But any new mild superimposed atelectasis or fibrosis in this area not excluded. Top-normal cardiac size.  No evidence of cardiac decompensation. No pleural effusion or pneumothorax.       The findings from the last RT Protocol Assessment were as follows:   History Pulmonary Disease: Smoker 15 pack years or more  Respiratory Pattern: Regular pattern and RR 12-20 bpm  Breath Sounds: Clear breath sounds  Cough: Strong, spontaneous, non-productive  Indication for Bronchodilator Therapy: On home bronchodilators  Bronchodilator Assessment Score: 1    Aerosolized bronchodilator medication orders have been revised according to the RT Inhaler-Nebulizer Bronchodilator Protocol below.    Respiratory Therapist to perform RT Therapy Protocol Assessment initially then follow the protocol.  Repeat RT Therapy Protocol Assessment PRN for score 0-3 or on second treatment, BID, and PRN for scores above 3.    No Indications - adjust the frequency to every 6 hours PRN wheezing or bronchospasm, if no treatments needed after 48 hours then discontinue using Per Protocol order mode.     If indication present, adjust the RT bronchodilator orders based on the Bronchodilator Assessment Score as indicated below.  Use Inhaler orders unless patient has one or more of the following: on home nebulizer, not able to hold breath for 10 seconds, is not alert

## 2024-04-03 NOTE — H&P
[facility]  Office: [phone]  Bryan Bello DO, Volodymyr Chua DO, Chevy Hamilton DO, Wilfred Johnson DO, Chaparro Mitchell MD, Shavonne Pal MD, Jun Chinchilla MD, Tameka Martines MD,  Kumar Beach MD, Neto Huggins MD, Ángel Villarreal MD,  Joseph Chapman DO, Nick Adorno MD, Barry Maldonado MD, Vivek Bello DO, Vonda Denis MD,  Enrique Poon DO, Theodora Kendall MD, Miranda Alberts MD, Treva Baltazar MD, John Vargas MD,  Raúl Lorenzo MD, Richard Cheatham MD, Allen Holland MD, Jian Viera MD, Moses Fallon MD, Lillian Alatorre MD, Leonidas Gee DO, Ming Khanna DO, Stewart Ortiz MD,  Tyrone Ny MD, Shirley Waterhouse, CNP,  Anali Howard CNP, Miki Rogers, CNP,  Latisha Shaw, DNP, Pura Braga, CNP, Suzy Fry, CNP, Tiffani Watt CNP, Arabella Esposito, CNP, Dawna Rosas, PA-C, Billie Diop PA-C, Romy Bae, CNP, Kat Hayes, CNP, Marcia Mike, CNP, Yesica Hatch, CNP, Demetra Wyman, CNP, Jennifer Dan, CNS, Madelaine Pinto, CNP, Ambreen Sheppard CNP, Tracy Schwab, CNP         [facility]   IN-PATIENT SERVICE   [facility]    HISTORY AND PHYSICAL EXAMINATION            Date:   4/2/2024  Patient name:  Verena Elliott  Date of admission:  4/2/2024  6:42 PM  MRN:   [id number]  Account:  [id number]  YOB: 1955  PCP:    Donal Cook DO  Room:   [place] [address]  Code Status:    No Order    Chief Complaint:     Chief Complaint   Patient presents with    Generalized Body Aches     Pt reports generalized body aches since Thursday night. Reports cough    Abdominal Pain     RLQ abdominal pain with nausea       History of Present Illness:     Verena Elliott is a with dm who presents with intermittent lower right colicky 6/10 abdominal pain associated with nausea, urinary frequency. Patient reports overall body aches, weakness. Pain radiated from flank to lower right abdomen but reports pressure lower abdomen. Pain worse  with urination. Pain progressive . Ct scan in er concerning for hydronephrosis. No appendicitis or gi pathology appreciiaed. Patient had GRACIELA, obstructed right kidney stone with hydronephrosis. Ivf, analgesia, urology consulted. UA obtained for possible coexisting UTI. Admitted for further wwork up and evaluation.     Past Medical History:     Past Medical History:   Diagnosis Date    Diabetes mellitus (HCC)     Dyslipidemia     Urticaria, chronic         Past Surgical History:     No past surgical history on file.     Medications Prior to Admission:     Prior to Admission medications    Medication Sig Start Date End Date Taking? Authorizing Provider   atorvastatin (LIPITOR) 80 MG tablet Take 1 tablet by mouth daily 3/20/24   Donal Cook DO   famotidine (PEPCID) 40 MG tablet Take 1 tablet by mouth every evening 3/20/24   Donal Cook DO   metoprolol tartrate (LOPRESSOR) 25 MG tablet Take 1 tablet by mouth daily 3/20/24   Donal Cook DO   metFORMIN (GLUCOPHAGE-XR) 500 MG extended release tablet Take 2 tablets by mouth daily (with breakfast) 3/20/24   Donal Cook DO   semaglutide, 2 MG/DOSE, (OZEMPIC, 2 MG/DOSE,) 8 MG/3ML SOPN sc injection Inject 0.75 mLs into the skin every 7 days 2/15/24   Donal Cook DO   montelukast (SINGULAIR) 10 MG tablet Take 1 tablet by mouth nightly 12/19/23   Romina Reyes APRN - CNP   fenofibrate (TRIGLIDE) 160 MG tablet TAKE 1 TABLET BY MOUTH DAILY 11/20/23   Donal Cook DO   albuterol sulfate HFA (PROVENTIL HFA) 108 (90 Base) MCG/ACT inhaler Inhale 2 puffs into the lungs every 6 hours as needed for Wheezing 8/21/23   Romina Reyes APRN - CNP   mometasone (ELOCON) 0.1 % cream Apply topically daily. 7/17/23   Kori Michaud APRN - CNP   aspirin (ASPIRIN LOW DOSE) 81 MG EC tablet TAKE 1 TABLET BY MOUTH ONCE DAILY 2/28/22   Donal Cook DO        Allergies:     Sulfa antibiotics    Social History:     Tobacco:

## 2024-04-04 LAB
ANION GAP SERPL CALCULATED.3IONS-SCNC: 10 MMOL/L (ref 9–17)
ANION GAP SERPL CALCULATED.3IONS-SCNC: 9 MMOL/L (ref 9–17)
BASOPHILS # BLD: 0.08 K/UL (ref 0–0.2)
BASOPHILS # BLD: 0.12 K/UL (ref 0–0.2)
BASOPHILS NFR BLD: 1 % (ref 0–2)
BASOPHILS NFR BLD: 1 % (ref 0–2)
BUN SERPL-MCNC: 55 MG/DL (ref 8–23)
BUN SERPL-MCNC: 68 MG/DL (ref 8–23)
BUN/CREAT SERPL: 39 (ref 9–20)
BUN/CREAT SERPL: 43 (ref 9–20)
CALCIUM SERPL-MCNC: 8.1 MG/DL (ref 8.6–10.4)
CALCIUM SERPL-MCNC: 8.2 MG/DL (ref 8.6–10.4)
CHLORIDE SERPL-SCNC: 100 MMOL/L (ref 98–107)
CHLORIDE SERPL-SCNC: 102 MMOL/L (ref 98–107)
CO2 SERPL-SCNC: 19 MMOL/L (ref 20–31)
CO2 SERPL-SCNC: 20 MMOL/L (ref 20–31)
CREAT SERPL-MCNC: 1.4 MG/DL (ref 0.5–0.9)
CREAT SERPL-MCNC: 1.6 MG/DL (ref 0.5–0.9)
EOSINOPHIL # BLD: 0.04 K/UL (ref 0–0.44)
EOSINOPHIL # BLD: 0.12 K/UL (ref 0–0.44)
EOSINOPHILS RELATIVE PERCENT: 0 % (ref 1–4)
EOSINOPHILS RELATIVE PERCENT: 1 % (ref 1–4)
ERYTHROCYTE [DISTWIDTH] IN BLOOD BY AUTOMATED COUNT: 14.5 % (ref 11.8–14.4)
ERYTHROCYTE [DISTWIDTH] IN BLOOD BY AUTOMATED COUNT: 14.6 % (ref 11.8–14.4)
GFR SERPL CREATININE-BSD FRML MDRD: 35 ML/MIN/1.73M2
GFR SERPL CREATININE-BSD FRML MDRD: 41 ML/MIN/1.73M2
GLUCOSE BLD-MCNC: 155 MG/DL (ref 65–105)
GLUCOSE BLD-MCNC: 253 MG/DL (ref 65–105)
GLUCOSE BLD-MCNC: 265 MG/DL (ref 65–105)
GLUCOSE BLD-MCNC: 290 MG/DL (ref 65–105)
GLUCOSE SERPL-MCNC: 190 MG/DL (ref 70–99)
GLUCOSE SERPL-MCNC: 314 MG/DL (ref 70–99)
HCT VFR BLD AUTO: 34.1 % (ref 36.3–47.1)
HCT VFR BLD AUTO: 34.5 % (ref 36.3–47.1)
HGB BLD-MCNC: 10.8 G/DL (ref 11.9–15.1)
HGB BLD-MCNC: 11 G/DL (ref 11.9–15.1)
IMM GRANULOCYTES # BLD AUTO: 0.09 K/UL (ref 0–0.3)
IMM GRANULOCYTES # BLD AUTO: 0.12 K/UL (ref 0–0.3)
IMM GRANULOCYTES NFR BLD: 1 %
IMM GRANULOCYTES NFR BLD: 1 %
LYMPHOCYTES NFR BLD: 0.92 K/UL (ref 1.1–3.7)
LYMPHOCYTES NFR BLD: 1.24 K/UL (ref 1.1–3.7)
LYMPHOCYTES RELATIVE PERCENT: 10 % (ref 24–43)
LYMPHOCYTES RELATIVE PERCENT: 7 % (ref 24–43)
MCH RBC QN AUTO: 30.9 PG (ref 25.2–33.5)
MCH RBC QN AUTO: 31.1 PG (ref 25.2–33.5)
MCHC RBC AUTO-ENTMCNC: 31.7 G/DL (ref 28.4–34.8)
MCHC RBC AUTO-ENTMCNC: 31.9 G/DL (ref 28.4–34.8)
MCV RBC AUTO: 97.4 FL (ref 82.6–102.9)
MCV RBC AUTO: 97.5 FL (ref 82.6–102.9)
MICROORGANISM SPEC CULT: ABNORMAL
MONOCYTES NFR BLD: 0.99 K/UL (ref 0.1–1.2)
MONOCYTES NFR BLD: 1 K/UL (ref 0.1–1.2)
MONOCYTES NFR BLD: 8 % (ref 3–12)
MONOCYTES NFR BLD: 8 % (ref 3–12)
NEUTROPHILS NFR BLD: 79 % (ref 36–65)
NEUTROPHILS NFR BLD: 83 % (ref 36–65)
NEUTS SEG NFR BLD: 11.19 K/UL (ref 1.5–8.1)
NEUTS SEG NFR BLD: 9.81 K/UL (ref 1.5–8.1)
NRBC BLD-RTO: 0 PER 100 WBC
NRBC BLD-RTO: 0 PER 100 WBC
PLATELET # BLD AUTO: 106 K/UL (ref 138–453)
PLATELET # BLD AUTO: 89 K/UL (ref 138–453)
PMV BLD AUTO: 12.3 FL (ref 8.1–13.5)
PMV BLD AUTO: 12.8 FL (ref 8.1–13.5)
POTASSIUM SERPL-SCNC: 3.7 MMOL/L (ref 3.7–5.3)
POTASSIUM SERPL-SCNC: 3.8 MMOL/L (ref 3.7–5.3)
PROCALCITONIN SERPL-MCNC: 10.2 NG/ML (ref 0–0.09)
RBC # BLD AUTO: 3.5 M/UL (ref 3.95–5.11)
RBC # BLD AUTO: 3.54 M/UL (ref 3.95–5.11)
RBC # BLD: ABNORMAL 10*6/UL
SODIUM SERPL-SCNC: 129 MMOL/L (ref 135–144)
SODIUM SERPL-SCNC: 131 MMOL/L (ref 135–144)
SPECIMEN DESCRIPTION: ABNORMAL
WBC OTHER # BLD: 12.4 K/UL (ref 3.5–11.3)
WBC OTHER # BLD: 13.3 K/UL (ref 3.5–11.3)

## 2024-04-04 PROCEDURE — 82947 ASSAY GLUCOSE BLOOD QUANT: CPT

## 2024-04-04 PROCEDURE — 6370000000 HC RX 637 (ALT 250 FOR IP): Performed by: STUDENT IN AN ORGANIZED HEALTH CARE EDUCATION/TRAINING PROGRAM

## 2024-04-04 PROCEDURE — 2580000003 HC RX 258: Performed by: UROLOGY

## 2024-04-04 PROCEDURE — 6370000000 HC RX 637 (ALT 250 FOR IP): Performed by: UROLOGY

## 2024-04-04 PROCEDURE — 97116 GAIT TRAINING THERAPY: CPT

## 2024-04-04 PROCEDURE — 97166 OT EVAL MOD COMPLEX 45 MIN: CPT

## 2024-04-04 PROCEDURE — C9113 INJ PANTOPRAZOLE SODIUM, VIA: HCPCS | Performed by: UROLOGY

## 2024-04-04 PROCEDURE — 97162 PT EVAL MOD COMPLEX 30 MIN: CPT

## 2024-04-04 PROCEDURE — 85025 COMPLETE CBC W/AUTO DIFF WBC: CPT

## 2024-04-04 PROCEDURE — 6360000002 HC RX W HCPCS: Performed by: UROLOGY

## 2024-04-04 PROCEDURE — 99232 SBSQ HOSP IP/OBS MODERATE 35: CPT | Performed by: STUDENT IN AN ORGANIZED HEALTH CARE EDUCATION/TRAINING PROGRAM

## 2024-04-04 PROCEDURE — 1200000000 HC SEMI PRIVATE

## 2024-04-04 PROCEDURE — 84145 PROCALCITONIN (PCT): CPT

## 2024-04-04 PROCEDURE — 97535 SELF CARE MNGMENT TRAINING: CPT

## 2024-04-04 PROCEDURE — A4216 STERILE WATER/SALINE, 10 ML: HCPCS | Performed by: UROLOGY

## 2024-04-04 PROCEDURE — 97110 THERAPEUTIC EXERCISES: CPT

## 2024-04-04 PROCEDURE — 36415 COLL VENOUS BLD VENIPUNCTURE: CPT

## 2024-04-04 PROCEDURE — 80048 BASIC METABOLIC PNL TOTAL CA: CPT

## 2024-04-04 RX ORDER — INSULIN LISPRO 100 [IU]/ML
0-4 INJECTION, SOLUTION INTRAVENOUS; SUBCUTANEOUS NIGHTLY
Status: DISCONTINUED | OUTPATIENT
Start: 2024-04-04 | End: 2024-04-06 | Stop reason: HOSPADM

## 2024-04-04 RX ORDER — CEPHALEXIN 250 MG/1
250 CAPSULE ORAL EVERY 6 HOURS SCHEDULED
Status: DISCONTINUED | OUTPATIENT
Start: 2024-04-04 | End: 2024-04-05

## 2024-04-04 RX ORDER — INSULIN LISPRO 100 [IU]/ML
0-16 INJECTION, SOLUTION INTRAVENOUS; SUBCUTANEOUS
Status: DISCONTINUED | OUTPATIENT
Start: 2024-04-04 | End: 2024-04-06 | Stop reason: HOSPADM

## 2024-04-04 RX ADMIN — CEPHALEXIN 250 MG: 250 CAPSULE ORAL at 18:26

## 2024-04-04 RX ADMIN — INSULIN HUMAN 30 UNITS: 100 INJECTION, SUSPENSION SUBCUTANEOUS at 08:19

## 2024-04-04 RX ADMIN — INSULIN LISPRO 8 UNITS: 100 INJECTION, SOLUTION INTRAVENOUS; SUBCUTANEOUS at 11:44

## 2024-04-04 RX ADMIN — PANTOPRAZOLE SODIUM 40 MG: 40 INJECTION, POWDER, FOR SOLUTION INTRAVENOUS at 23:38

## 2024-04-04 RX ADMIN — MORPHINE SULFATE 2 MG: 2 INJECTION, SOLUTION INTRAMUSCULAR; INTRAVENOUS at 05:40

## 2024-04-04 RX ADMIN — SODIUM CHLORIDE: 9 INJECTION, SOLUTION INTRAVENOUS at 13:27

## 2024-04-04 RX ADMIN — CEPHALEXIN 250 MG: 250 CAPSULE ORAL at 23:38

## 2024-04-04 RX ADMIN — INSULIN LISPRO 8 UNITS: 100 INJECTION, SOLUTION INTRAVENOUS; SUBCUTANEOUS at 16:54

## 2024-04-04 RX ADMIN — ASPIRIN 81 MG: 81 TABLET, COATED ORAL at 08:18

## 2024-04-04 RX ADMIN — POLYETHYLENE GLYCOL 3350 17 G: 17 POWDER, FOR SOLUTION ORAL at 11:32

## 2024-04-04 RX ADMIN — ATORVASTATIN CALCIUM 80 MG: 80 TABLET, FILM COATED ORAL at 08:18

## 2024-04-04 RX ADMIN — CEPHALEXIN 250 MG: 250 CAPSULE ORAL at 11:44

## 2024-04-04 RX ADMIN — INSULIN LISPRO 4 UNITS: 100 INJECTION, SOLUTION INTRAVENOUS; SUBCUTANEOUS at 08:18

## 2024-04-04 RX ADMIN — METOPROLOL TARTRATE 25 MG: 25 TABLET, FILM COATED ORAL at 08:18

## 2024-04-04 RX ADMIN — PANTOPRAZOLE SODIUM 40 MG: 40 INJECTION, POWDER, FOR SOLUTION INTRAVENOUS at 11:32

## 2024-04-04 ASSESSMENT — ENCOUNTER SYMPTOMS
CHEST TIGHTNESS: 0
CONSTIPATION: 0
SHORTNESS OF BREATH: 0
VOMITING: 0
RHINORRHEA: 0
BLOOD IN STOOL: 0
COUGH: 0
NAUSEA: 0
ABDOMINAL PAIN: 0

## 2024-04-04 ASSESSMENT — PAIN DESCRIPTION - LOCATION: LOCATION: GROIN

## 2024-04-04 ASSESSMENT — PAIN SCALES - GENERAL: PAINLEVEL_OUTOF10: 7

## 2024-04-04 NOTE — PLAN OF CARE
Pt has been resting comfortably for majority of the day. Pt remains on room air. Pt hasn't complained of any pain. Pt was up in the chair for a couple of hours and stated it was uncomfortable and wanted to go back to bed. Pt has been very tired today, attending was notified, however pt states this is normal for the past couple weeks. Pt was started on oral keflex today, pt has been educated on it. Pt has had great output today. Pt requested something to help her have a bowel movement. Pt received PRN glycolax and currently waiting for pt to have a BM. All questions and concerned addressed. Safety maintained. Bed is locked and in the lowest position with the call light in reach.    Problem: Discharge Planning  Goal: Discharge to home or other facility with appropriate resources  4/4/2024 1416 by Beverly Don RN  Outcome: Progressing     Problem: Pain  Goal: Verbalizes/displays adequate comfort level or baseline comfort level  4/4/2024 1416 by Beverly Don RN  Outcome: Progressing  Flowsheets (Taken 4/4/2024 1416)  Verbalizes/displays adequate comfort level or baseline comfort level:   Encourage patient to monitor pain and request assistance   Assess pain using appropriate pain scale   Administer analgesics based on type and severity of pain and evaluate response   Implement non-pharmacological measures as appropriate and evaluate response   Consider cultural and social influences on pain and pain management   Notify Licensed Independent Practitioner if interventions unsuccessful or patient reports new pain    Problem: Safety - Adult  Goal: Free from fall injury  4/4/2024 1416 by Beverly Don RN  Outcome: Progressing

## 2024-04-04 NOTE — PROGRESS NOTES
[facility]  Office: [phone]  Bryan Bello DO, Volodymyr Chua DO, Chevy Hamilton DO, Wilfred Johnson DO, Chaparro Mitchell MD, Shavonne Pal MD, Jun Chinchilla MD, Tameka Martines MD,  Kumar Beach MD, Neto Huggins MD, Ángel Villarreal MD,  Joseph Chapman DO, Nick Adorno MD, Barry Maldonado MD, Vivek Bello DO, Vonda Denis MD,  Enrique Poon DO, Theodora Kendall MD, Miranda Alberts MD, Treva Baltazar MD, John Vargas MD,  Raúl Lorenzo MD, Richard Cheatham MD, Allen Holland MD, Jian Viera MD, Moses Fallon MD, Lillian Alatorre MD, Leonidas Gee DO, Ming Khanna DO, Stewart Ortiz MD,  Tyrone Ny MD, Shirley Waterhouse, CNP,  Anali Howard CNP, Miki Rogers, CNP,  Latisha Shaw, MCKENZIE, Pura Braga, CNP, Suzy Fry, CNP, Tiffani Watt CNP, Arabella Esposito, CNP, Dawna Rosas, PA-C, Billie Diop PA-C, Romy Bae, CNP, Kat Hayes, CNP, Marcia Mike, CNP, Yesica Hatch, CNP, Demetra Wyman, CNP, Jennifer Dan, CNS, Madelaine Pinto, CNP, Ambreen Sheppard CNP, Tracy Schwab, CNP       [facility]      Daily Progress Note     Admit Date: 4/2/2024  Bed/Room No.  1003/1003-02  Admitting Physician : Moses Fallon MD  Code Status :Full Code  Hospital Day:  LOS: 2 days   Chief Complaint:     Chief Complaint   Patient presents with    Generalized Body Aches     Pt reports generalized body aches since Thursday night. Reports cough    Abdominal Pain     RLQ abdominal pain with nausea     Principal Problem:    Hydronephrosis concurrent with and due to calculi of kidney and ureter  Active Problems:    Dyslipidemia    Diabetes mellitus (HCC)    Acute pyelonephritis    Thrombocytopenia (HCC)    Sepsis due to gram-negative urinary tract infection (HCC)  Resolved Problems:    * No resolved hospital problems. *    Subjective :        Interval History/Significant events :  04/04/24    Patient seen and examined this morning.  Remained afebrile, vitals within  normal range, saturating well on room air.  Lab work reviewed, sodium 131, creatinine trended down to 1.6 blood sugar 314.  WBC 13.3, hemoglobin 10.8, platelets 89.  Blood cultures negative so far, urine culture growing E. coli.  IV antibiotics switched to p.o. today.    Nursing notes , Consults notes reviewed. Overnight events and updates discussed with Nursing staff .   Background History:         Verena Elliott is 68 y.o. female  Who was admitted to the hospital on 4/2/2024 for treatment of Hydronephrosis concurrent with and due to calculi of kidney and ureter.   Patient admitted with generalized abdominal pain worse in the right lower quadrant for 4 days. Patient tried to drink more water but it didn't help. Patient has been mostly in bed with increased fatigue. Evaluation emergency room showed leukocytosis.  BUN was elevated 70, creatinine 2.8.  CT abdomen pelvis without contrast showed mild right-sided hydroureteronephrosis with distal right ureter calculus 6 mm, nonobstructing right renal calculus measuring 5 mm.  Patient has underlying history of diabetes mellitus, dyslipidemia.    PMH:  Past Medical History:   Diagnosis Date    CAD (coronary artery disease) 2012    s/p stent -    Diabetes mellitus (HCC)     Dyslipidemia     Urticaria, chronic       Allergies:   Allergies   Allergen Reactions    Sulfa Antibiotics       Medications :  insulin lispro, 0-16 Units, SubCUTAneous, TID WC  insulin lispro, 0-4 Units, SubCUTAneous, Nightly  cephALEXin, 250 mg, Oral, 4 times per day  aspirin, 81 mg, Oral, Daily  metoprolol tartrate, 25 mg, Oral, Daily  insulin NPH, 30 Units, SubCUTAneous, QAM  atorvastatin, 80 mg, Oral, Daily  sodium chloride flush, 5-40 mL, IntraVENous, 2 times per day  pantoprazole (PROTONIX) 40 mg in sodium chloride (PF) 0.9 % 10 mL injection, 40 mg, IntraVENous, Q12H  [Held by provider] heparin (porcine), 5,000 Units, SubCUTAneous, 3 times per day        Review of Systems   Review of Systems

## 2024-04-04 NOTE — PROGRESS NOTES
Physical Therapy  Facility/Department: [facility] CARE  Physical Therapy Initial Assessment    Name: Verena Elliott  : 1955  MRN: [id number]  Date of Service: 2024               HPI   The patient is a 68-year-old female presented to the emergency department secondary to abdominal pain with generalized bodyaches.  Patient says since Thursday as she has generalized bodyaches with nausea and vomiting.  No diarrhea.  She complains of pain localized to her right lower quadrant.  She had a previous history of IBS Crohn's or diverticulosis.  Patient denies chest pain, fevers or chills    Patient Diagnosis(es): The primary encounter diagnosis was Urinary tract infection without hematuria, site unspecified. Diagnoses of GRACIELA (acute kidney injury) (HCC), Kidney stone, and Ureteral calculus were also pertinent to this visit.  Past Medical History:  has a past medical history of CAD (coronary artery disease), Diabetes mellitus (HCC), Dyslipidemia, and Urticaria, chronic.  Past Surgical History:  has a past surgical history that includes knee surgery (Left);  section, low transverse; and Cystoscopy (Right, 4/3/2024).    Assessment   Assessment: Pt. given morphine for pain. Appeared to be having difficulty focusing at times/ groggy. Able to amb. in room with RW, go to bathroom and get up to recliner, min A. Pt. normally works full time/ drives/ is indep. at home. Will continue to progress this admission.  Specific Instructions for Next Treatment: progress gait- pt. is normally indep but needing RW at eval. Continue to assess if needed for safe discharge.  Therapy Prognosis: Excellent  Decision Making: Medium Complexity  Requires PT Follow-Up: Yes  Activity Tolerance  Activity Tolerance: Other (comment) (morphine making groggy; pain from cystoscopy yesterday)     Plan   Physical Therapy Plan  General Plan: 5-7 times per week  Specific Instructions for Next Treatment: progress gait- pt. is normally indep but

## 2024-04-04 NOTE — PROGRESS NOTES
Sy Andrade MD   Urology Progress Note            Subjective: Follow-up cystoscopy laser lithotripsy acute kidney injury    Patient Vitals for the past 24 hrs:   BP Temp Temp src Pulse Resp SpO2 Weight   04/04/24 0620 -- -- -- -- -- -- 98.8 kg (217 lb 14.4 oz)   04/04/24 0540 -- -- -- -- 18 -- --   04/04/24 0539 (!) 142/68 -- -- -- -- 95 % --   04/04/24 0345 (!) 140/64 98.2 °F (36.8 °C) -- 88 16 95 % --   04/04/24 0033 132/70 97.5 °F (36.4 °C) -- 90 16 95 % --   04/03/24 2012 (!) 113/52 97.5 °F (36.4 °C) -- (!) 103 16 90 % --   04/03/24 1625 (!) 135/58 98.2 °F (36.8 °C) Oral 100 15 92 % --   04/03/24 1445 124/60 98.8 °F (37.1 °C) -- 97 22 92 % --   04/03/24 1430 (!) 115/58 -- -- 95 20 91 % --   04/03/24 1415 (!) 111/59 -- -- 95 23 91 % --   04/03/24 1400 (!) 92/57 -- -- 95 21 -- --   04/03/24 1345 (!) 91/52 -- -- 95 19 96 % --   04/03/24 1344 (!) 91/53 98.4 °F (36.9 °C) -- 95 18 96 % --   04/03/24 1342 (!) 91/53 98.6 °F (37 °C) Temporal 96 17 96 % --   04/03/24 0713 (!) 110/54 97.5 °F (36.4 °C) Oral 96 16 92 % --       Intake/Output Summary (Last 24 hours) at 4/4/2024 0637  Last data filed at 4/4/2024 0543  Gross per 24 hour   Intake 556 ml   Output 1300 ml   Net -744 ml       Recent Labs     04/02/24  1915 04/03/24  0520   WBC 14.3* 15.1*   HGB 12.1 10.7*   HCT 37.7 33.0*   MCV 97.2 95.7   PLT 70* 76*     Recent Labs     04/02/24 2020 04/03/24  1820   * 128*   K 3.9 3.7   CL 96* 97*   CO2 22 20   BUN 70* 76*   CREATININE 2.8* 2.3*       Recent Labs     04/02/24  2120   COLORU Yellow   PHUR 5.0   WBCUA TOO NUMEROUS TO COUNT   RBCUA 10 TO 20   BACTERIA FEW*   SPECGRAV 1.027   LEUKOCYTESUR LARGE*   UROBILINOGEN ELEVATED   BILIRUBINUR Presumptive positive. Unable to confirm due to unavailability of reagent.*       Additional Lab/culture results:    Physical Exam: Patient alert not in acute distress patient's status post laser lithotripsy creatinine trending down monitoring

## 2024-04-04 NOTE — PROGRESS NOTES
Occupational Therapy  Facility/Department: [facility] PROGRESSIVE CARE  Occupational Therapy Initial Assessment    Name: Verena Elliott  : 1955  MRN: [id number]  Date of Service: 2024    Discharge Recommendations:  Continue to assess pending progress  OT Equipment Recommendations  Equipment Needed:  (TBA further- pt may benefit from RW and/or shower chair pending progress)   RN reports patient is medically stable for therapy treatment this date.    Chart reviewed prior to treatment and patient is agreeable for therapy.  All lines intact and patient positioned comfortably at end of treatment.  All patient needs addressed prior to ending therapy session.       Patient Diagnosis(es): The primary encounter diagnosis was Urinary tract infection without hematuria, site unspecified. Diagnoses of GRACIELA (acute kidney injury) (HCC), Kidney stone, and Ureteral calculus were also pertinent to this visit.  Past Medical History:  has a past medical history of CAD (coronary artery disease), Diabetes mellitus (HCC), Dyslipidemia, and Urticaria, chronic.  Past Surgical History:  has a past surgical history that includes knee surgery (Left);  section, low transverse; and Cystoscopy (Right, 4/3/2024).     RN reports patient is medically stable for therapy treatment this date.    Chart reviewed prior to treatment and patient is agreeable for therapy.  All lines intact and patient positioned comfortably at end of treatment.  All patient needs addressed prior to ending therapy session.       HPI   The patient is a 68-year-old female presented to the emergency department secondary to abdominal pain with generalized bodyaches.  Patient says since Thursday as she has generalized bodyaches with nausea and vomiting.  No diarrhea.  She complains of pain localized to her right lower quadrant.  She had a previous history of IBS Crohn's or diverticulosis.  Patient denies chest pain, fevers or chills    Assessment   Performance deficits /  Impairments: Decreased functional mobility ;Decreased ADL status;Decreased strength;Decreased safe awareness;Decreased cognition;Decreased sensation;Decreased posture;Decreased endurance  Assessment: Skilled OT is indicated to increase overall safety awareness in function as well as strenght, balance, ADL status, functional mobility, and cognition to improve functional outcomes, I, and return to home.   Prognosis: Good  Decision Making: Medium Complexity  REQUIRES OT FOLLOW-UP: Yes  Activity Tolerance  Activity Tolerance: Patient limited by fatigue        Plan   Occupational Therapy Plan  Times Per Week: 4-5x/week  Current Treatment Recommendations: Strengthening, Balance training, Functional mobility training, Safety education & training, Endurance training, Patient/Caregiver education & training, Equipment evaluation, education, & procurement, Positioning, Self-Care / ADL     Restrictions  Restrictions/Precautions  Restrictions/Precautions: Fall Risk, General Precautions  Position Activity Restriction  Other position/activity restrictions: IV R UE    Subjective   General  Chart Reviewed: Yes  Patient assessed for rehabilitation services?: Yes  Additional Pertinent Hx: DM  Family / Caregiver Present: No  Subjective  Subjective: R side pain     Social/Functional History  Social/Functional History  Lives With: Son (son works 3rd shift so sleeps during the day)  Type of Home: House  Home Layout: One level  Home Access: Stairs to enter with rails  Entrance Stairs - Number of Steps: 3  Entrance Stairs - Rails: Right  Bathroom Shower/Tub: Tub/Shower unit  Bathroom Toilet: Standard  Home Equipment:  (no DME)  Has the patient had two or more falls in the past year or any fall with injury in the past year?: No  ADL Assistance: Independent  Homemaking Assistance: Independent (shares with son)  Ambulation Assistance: Independent  Transfer Assistance: Independent  Active : Yes  Mode of Transportation: Car  Occupation:

## 2024-04-04 NOTE — PLAN OF CARE
Pt admitted for hydronephrosis of R kidney. Had cysto and lithotripsy yesterday with stent placement. Pt had urine output of 1100 overnight. NS running at 125. Pt started on 2L NC but now weaned off on RA. IV morphine given once this morning and that's all pt wanted for pain. Bed in lowest position, call light within reach, and bed alarm on.     Problem: Respiratory - Adult  Goal: Achieves optimal ventilation and oxygenation  Outcome: Progressing     Problem: Genitourinary - Adult  Goal: Absence of urinary retention  Outcome: Progressing     Problem: Pain  Goal: Verbalizes/displays adequate comfort level or baseline comfort level  4/4/2024 0551 by Mary Anne Yost, RN  Outcome: Not Progressing

## 2024-04-05 ENCOUNTER — APPOINTMENT (OUTPATIENT)
Dept: GENERAL RADIOLOGY | Age: 69
DRG: 853 | End: 2024-04-05
Payer: COMMERCIAL

## 2024-04-05 LAB
ANION GAP SERPL CALCULATED.3IONS-SCNC: 8 MMOL/L (ref 9–17)
ANION GAP SERPL CALCULATED.3IONS-SCNC: 9 MMOL/L (ref 9–17)
BASOPHILS # BLD: 0 K/UL (ref 0–0.2)
BASOPHILS NFR BLD: 0 %
BUN SERPL-MCNC: 32 MG/DL (ref 8–23)
BUN SERPL-MCNC: 42 MG/DL (ref 8–23)
BUN/CREAT SERPL: 36 (ref 9–20)
BUN/CREAT SERPL: 38 (ref 9–20)
CALCIUM SERPL-MCNC: 8.1 MG/DL (ref 8.6–10.4)
CALCIUM SERPL-MCNC: 8.3 MG/DL (ref 8.6–10.4)
CHLORIDE SERPL-SCNC: 104 MMOL/L (ref 98–107)
CHLORIDE SERPL-SCNC: 106 MMOL/L (ref 98–107)
CO2 SERPL-SCNC: 19 MMOL/L (ref 20–31)
CO2 SERPL-SCNC: 20 MMOL/L (ref 20–31)
CREAT SERPL-MCNC: 0.9 MG/DL (ref 0.5–0.9)
CREAT SERPL-MCNC: 1.1 MG/DL (ref 0.5–0.9)
EKG ATRIAL RATE: 90 BPM
EKG P AXIS: 41 DEGREES
EKG P-R INTERVAL: 164 MS
EKG Q-T INTERVAL: 386 MS
EKG QRS DURATION: 94 MS
EKG QTC CALCULATION (BAZETT): 472 MS
EKG R AXIS: -8 DEGREES
EKG T AXIS: 32 DEGREES
EKG VENTRICULAR RATE: 90 BPM
EOSINOPHIL # BLD: 0.11 K/UL (ref 0–0.4)
EOSINOPHILS RELATIVE PERCENT: 1 % (ref 1–4)
ERYTHROCYTE [DISTWIDTH] IN BLOOD BY AUTOMATED COUNT: 14.6 % (ref 11.8–14.4)
GFR SERPL CREATININE-BSD FRML MDRD: 55 ML/MIN/1.73M2
GFR SERPL CREATININE-BSD FRML MDRD: 71 ML/MIN/1.73M2
GLUCOSE BLD-MCNC: 183 MG/DL (ref 65–105)
GLUCOSE BLD-MCNC: 237 MG/DL (ref 65–105)
GLUCOSE BLD-MCNC: 253 MG/DL (ref 65–105)
GLUCOSE BLD-MCNC: 254 MG/DL (ref 65–105)
GLUCOSE SERPL-MCNC: 236 MG/DL (ref 70–99)
GLUCOSE SERPL-MCNC: 269 MG/DL (ref 70–99)
HCT VFR BLD AUTO: 33.9 % (ref 36.3–47.1)
HGB BLD-MCNC: 10.9 G/DL (ref 11.9–15.1)
IMM GRANULOCYTES # BLD AUTO: 0.11 K/UL (ref 0–0.3)
IMM GRANULOCYTES NFR BLD: 1 %
LYMPHOCYTES NFR BLD: 1.14 K/UL (ref 1–4.8)
LYMPHOCYTES RELATIVE PERCENT: 10 % (ref 24–44)
MCH RBC QN AUTO: 31.1 PG (ref 25.2–33.5)
MCHC RBC AUTO-ENTMCNC: 32.2 G/DL (ref 28.4–34.8)
MCV RBC AUTO: 96.9 FL (ref 82.6–102.9)
MONOCYTES NFR BLD: 0.91 K/UL (ref 0.2–0.8)
MONOCYTES NFR BLD: 8 % (ref 1–7)
NEUTROPHILS NFR BLD: 80 % (ref 36–66)
NEUTS SEG NFR BLD: 9.13 K/UL (ref 1.8–7.7)
NRBC BLD-RTO: 0 PER 100 WBC
PLATELET # BLD AUTO: 106 K/UL (ref 138–453)
PMV BLD AUTO: 12.6 FL (ref 8.1–13.5)
POTASSIUM SERPL-SCNC: 3.9 MMOL/L (ref 3.7–5.3)
POTASSIUM SERPL-SCNC: 4.4 MMOL/L (ref 3.7–5.3)
RBC # BLD AUTO: 3.5 M/UL (ref 3.95–5.11)
SODIUM SERPL-SCNC: 133 MMOL/L (ref 135–144)
SODIUM SERPL-SCNC: 133 MMOL/L (ref 135–144)
WBC OTHER # BLD: 11.4 K/UL (ref 3.5–11.3)

## 2024-04-05 PROCEDURE — 6360000002 HC RX W HCPCS: Performed by: NURSE PRACTITIONER

## 2024-04-05 PROCEDURE — 2580000003 HC RX 258: Performed by: UROLOGY

## 2024-04-05 PROCEDURE — 71045 X-RAY EXAM CHEST 1 VIEW: CPT

## 2024-04-05 PROCEDURE — 99232 SBSQ HOSP IP/OBS MODERATE 35: CPT | Performed by: STUDENT IN AN ORGANIZED HEALTH CARE EDUCATION/TRAINING PROGRAM

## 2024-04-05 PROCEDURE — 1200000000 HC SEMI PRIVATE

## 2024-04-05 PROCEDURE — 6370000000 HC RX 637 (ALT 250 FOR IP): Performed by: UROLOGY

## 2024-04-05 PROCEDURE — 94669 MECHANICAL CHEST WALL OSCILL: CPT

## 2024-04-05 PROCEDURE — 36415 COLL VENOUS BLD VENIPUNCTURE: CPT

## 2024-04-05 PROCEDURE — 97535 SELF CARE MNGMENT TRAINING: CPT

## 2024-04-05 PROCEDURE — 94761 N-INVAS EAR/PLS OXIMETRY MLT: CPT

## 2024-04-05 PROCEDURE — 6360000002 HC RX W HCPCS: Performed by: UROLOGY

## 2024-04-05 PROCEDURE — C9113 INJ PANTOPRAZOLE SODIUM, VIA: HCPCS | Performed by: UROLOGY

## 2024-04-05 PROCEDURE — 6370000000 HC RX 637 (ALT 250 FOR IP): Performed by: NURSE PRACTITIONER

## 2024-04-05 PROCEDURE — 80048 BASIC METABOLIC PNL TOTAL CA: CPT

## 2024-04-05 PROCEDURE — 6370000000 HC RX 637 (ALT 250 FOR IP): Performed by: STUDENT IN AN ORGANIZED HEALTH CARE EDUCATION/TRAINING PROGRAM

## 2024-04-05 PROCEDURE — 94640 AIRWAY INHALATION TREATMENT: CPT

## 2024-04-05 PROCEDURE — A4216 STERILE WATER/SALINE, 10 ML: HCPCS | Performed by: UROLOGY

## 2024-04-05 PROCEDURE — 97530 THERAPEUTIC ACTIVITIES: CPT

## 2024-04-05 PROCEDURE — 6360000002 HC RX W HCPCS: Performed by: STUDENT IN AN ORGANIZED HEALTH CARE EDUCATION/TRAINING PROGRAM

## 2024-04-05 PROCEDURE — 82947 ASSAY GLUCOSE BLOOD QUANT: CPT

## 2024-04-05 PROCEDURE — 85025 COMPLETE CBC W/AUTO DIFF WBC: CPT

## 2024-04-05 RX ORDER — PANTOPRAZOLE SODIUM 40 MG/1
40 TABLET, DELAYED RELEASE ORAL
Status: DISCONTINUED | OUTPATIENT
Start: 2024-04-05 | End: 2024-04-06 | Stop reason: HOSPADM

## 2024-04-05 RX ORDER — ALBUTEROL SULFATE 90 UG/1
2 AEROSOL, METERED RESPIRATORY (INHALATION) EVERY 4 HOURS PRN
Status: DISCONTINUED | OUTPATIENT
Start: 2024-04-05 | End: 2024-04-06 | Stop reason: HOSPADM

## 2024-04-05 RX ORDER — LEVOFLOXACIN 750 MG/1
750 TABLET, FILM COATED ORAL DAILY
Status: DISCONTINUED | OUTPATIENT
Start: 2024-04-05 | End: 2024-04-06 | Stop reason: HOSPADM

## 2024-04-05 RX ORDER — PREDNISONE 5 MG/1
5 TABLET ORAL DAILY
Status: DISCONTINUED | OUTPATIENT
Start: 2024-04-14 | End: 2024-04-06 | Stop reason: HOSPADM

## 2024-04-05 RX ORDER — LABETALOL HYDROCHLORIDE 5 MG/ML
20 INJECTION, SOLUTION INTRAVENOUS ONCE
Status: COMPLETED | OUTPATIENT
Start: 2024-04-05 | End: 2024-04-05

## 2024-04-05 RX ORDER — PREDNISONE 20 MG/1
20 TABLET ORAL DAILY
Status: DISCONTINUED | OUTPATIENT
Start: 2024-04-05 | End: 2024-04-06 | Stop reason: HOSPADM

## 2024-04-05 RX ORDER — ALBUTEROL SULFATE 2.5 MG/3ML
2.5 SOLUTION RESPIRATORY (INHALATION)
Status: DISCONTINUED | OUTPATIENT
Start: 2024-04-05 | End: 2024-04-06

## 2024-04-05 RX ORDER — ALBUTEROL SULFATE 2.5 MG/3ML
2.5 SOLUTION RESPIRATORY (INHALATION)
Status: DISCONTINUED | OUTPATIENT
Start: 2024-04-05 | End: 2024-04-05

## 2024-04-05 RX ORDER — PREDNISONE 10 MG/1
10 TABLET ORAL DAILY
Status: DISCONTINUED | OUTPATIENT
Start: 2024-04-11 | End: 2024-04-06 | Stop reason: HOSPADM

## 2024-04-05 RX ADMIN — LEVOFLOXACIN 750 MG: 750 TABLET, FILM COATED ORAL at 12:12

## 2024-04-05 RX ADMIN — PREDNISONE 20 MG: 20 TABLET ORAL at 12:12

## 2024-04-05 RX ADMIN — Medication 10 MG: at 05:29

## 2024-04-05 RX ADMIN — METOPROLOL TARTRATE 25 MG: 25 TABLET, FILM COATED ORAL at 10:07

## 2024-04-05 RX ADMIN — ASPIRIN 81 MG: 81 TABLET, COATED ORAL at 10:07

## 2024-04-05 RX ADMIN — SODIUM CHLORIDE: 9 INJECTION, SOLUTION INTRAVENOUS at 20:38

## 2024-04-05 RX ADMIN — ALBUTEROL SULFATE 2.5 MG: 2.5 SOLUTION RESPIRATORY (INHALATION) at 20:15

## 2024-04-05 RX ADMIN — INSULIN HUMAN 30 UNITS: 100 INJECTION, SUSPENSION SUBCUTANEOUS at 10:07

## 2024-04-05 RX ADMIN — ALBUTEROL SULFATE 2.5 MG: 2.5 SOLUTION RESPIRATORY (INHALATION) at 11:43

## 2024-04-05 RX ADMIN — INSULIN LISPRO 4 UNITS: 100 INJECTION, SOLUTION INTRAVENOUS; SUBCUTANEOUS at 12:12

## 2024-04-05 RX ADMIN — POLYETHYLENE GLYCOL 3350 17 G: 17 POWDER, FOR SOLUTION ORAL at 05:04

## 2024-04-05 RX ADMIN — SODIUM CHLORIDE: 9 INJECTION, SOLUTION INTRAVENOUS at 12:21

## 2024-04-05 RX ADMIN — PANTOPRAZOLE SODIUM 40 MG: 40 TABLET, DELAYED RELEASE ORAL at 17:09

## 2024-04-05 RX ADMIN — CEPHALEXIN 250 MG: 250 CAPSULE ORAL at 05:03

## 2024-04-05 RX ADMIN — MORPHINE SULFATE 2 MG: 2 INJECTION, SOLUTION INTRAMUSCULAR; INTRAVENOUS at 11:56

## 2024-04-05 RX ADMIN — PANTOPRAZOLE SODIUM 40 MG: 40 INJECTION, POWDER, FOR SOLUTION INTRAVENOUS at 11:56

## 2024-04-05 RX ADMIN — ATORVASTATIN CALCIUM 80 MG: 80 TABLET, FILM COATED ORAL at 10:07

## 2024-04-05 RX ADMIN — LABETALOL HYDROCHLORIDE 20 MG: 5 INJECTION, SOLUTION INTRAVENOUS at 00:44

## 2024-04-05 RX ADMIN — INSULIN LISPRO 8 UNITS: 100 INJECTION, SOLUTION INTRAVENOUS; SUBCUTANEOUS at 10:07

## 2024-04-05 ASSESSMENT — ENCOUNTER SYMPTOMS
BLOOD IN STOOL: 0
ABDOMINAL PAIN: 0
WHEEZING: 0
DIARRHEA: 0
CHEST TIGHTNESS: 0
NAUSEA: 0
SHORTNESS OF BREATH: 0
VOMITING: 0
CONSTIPATION: 0
RHINORRHEA: 0
COUGH: 0

## 2024-04-05 ASSESSMENT — PAIN SCALES - GENERAL
PAINLEVEL_OUTOF10: 0
PAINLEVEL_OUTOF10: 6
PAINLEVEL_OUTOF10: 7

## 2024-04-05 ASSESSMENT — PAIN DESCRIPTION - DESCRIPTORS: DESCRIPTORS: STABBING

## 2024-04-05 ASSESSMENT — PAIN DESCRIPTION - ORIENTATION: ORIENTATION: RIGHT;LOWER

## 2024-04-05 ASSESSMENT — PAIN - FUNCTIONAL ASSESSMENT: PAIN_FUNCTIONAL_ASSESSMENT: ACTIVITIES ARE NOT PREVENTED

## 2024-04-05 ASSESSMENT — PAIN DESCRIPTION - LOCATION: LOCATION: ABDOMEN

## 2024-04-05 NOTE — PROGRESS NOTES
Sy Andrade MD   Urology Progress Note            Subjective: Follow-up ureteral colic acute kidney injury    Patient Vitals for the past 24 hrs:   BP Temp Temp src Pulse Resp SpO2   04/05/24 0724 (!) 149/76 98.6 °F (37 °C) Oral 87 (!) 86 92 %   04/05/24 0402 (!) 144/70 99.1 °F (37.3 °C) -- 82 17 92 %   04/05/24 0044 (!) 157/69 -- -- 94 -- --   04/04/24 2345 (!) 162/66 99.1 °F (37.3 °C) Oral 97 17 92 %   04/04/24 1924 (!) 156/73 99.1 °F (37.3 °C) -- 95 17 91 %   04/04/24 1530 (!) 118/57 98.5 °F (36.9 °C) Oral 95 18 91 %   04/04/24 1130 130/63 98.4 °F (36.9 °C) Oral 90 14 92 %       Intake/Output Summary (Last 24 hours) at 4/5/2024 0918  Last data filed at 4/5/2024 0517  Gross per 24 hour   Intake --   Output 2300 ml   Net -2300 ml       Recent Labs     04/04/24  0719 04/04/24  1950 04/05/24  0613   WBC 13.3* 12.4* 11.4*   HGB 10.8* 11.0* 10.9*   HCT 34.1* 34.5* 33.9*   MCV 97.4 97.5 96.9   PLT 89* 106* 106*     Recent Labs     04/04/24  0719 04/04/24  1819 04/05/24  0613   * 129* 133*   K 3.7 3.8 3.9    100 104   CO2 19* 20 20   BUN 68* 55* 42*   CREATININE 1.6* 1.4* 1.1*       Recent Labs     04/02/24 2120   COLORU Yellow   PHUR 5.0   WBCUA TOO NUMEROUS TO COUNT   RBCUA 10 TO 20   BACTERIA FEW*   SPECGRAV 1.027   LEUKOCYTESUR LARGE*   UROBILINOGEN ELEVATED   BILIRUBINUR Presumptive positive. Unable to confirm due to unavailability of reagent.*       Additional Lab/culture results:    Physical Exam: Patient alert oriented not in acute distress creatinine trending down close to baseline  Patient had a cystoscopy laser lithotripsy and stent placement  Interval Imaging Findings:    Impression:    Patient Active Problem List   Diagnosis    Dyslipidemia    Diabetes mellitus (HCC)    Chronic urticaria    ASHD (arteriosclerotic heart disease)    Primary osteoarthritis involving multiple joints    Elevated liver enzymes    Microalbuminuria    Mild intermittent asthma without

## 2024-04-05 NOTE — PLAN OF CARE
Problem: Respiratory - Adult  Goal: Achieves optimal ventilation and oxygenation  4/5/2024 1147 by Dinora Alvarado RCP  Outcome: Progressing  4/5/2024 0546 by Mary Anne Yost RN  Outcome: Progressing  Goal: Able to breathe comfortably  Description: Able to breathe comfortably  Outcome: Progressing  Goal: Clear lung sounds  Outcome: Progressing

## 2024-04-05 NOTE — PROGRESS NOTES
Pantoprazole changed from IV to PO per [facility] [place] policy.    Basic Criteria (please refer to hospital policy for details):  1. functioning GI tract  2. tolerating PO/NG routine medications    Thank you.  Milton Munguia RPH 4/5/2024 2:48 PM

## 2024-04-05 NOTE — PLAN OF CARE
Pt has been resting comfortably today. Pt has been less lethargic compared to yesterday. Pt gets up to use the restroom standby with a walker. Pt has complained of some pain today in the RLQ of the abdomen writer gave PRN morphine. Pt has a cough and was started on prednisone to help. Pt also was started on oral Levaquin today. Pt's blood sugar is now controlled and writer didn't have to cover with insulin for the dinner dose. All questions and concerns addressed. Safety maintained. Bed is locked and in the lowest position with the call light in reach.    Problem: Discharge Planning  Goal: Discharge to home or other facility with appropriate resources  4/5/2024 1807 by Beverly Don RN  Outcome: Progressing     Problem: Pain  Goal: Verbalizes/displays adequate comfort level or baseline comfort level  4/5/2024 1807 by Beverly Don, RN  Outcome: Progressing  Flowsheets (Taken 4/5/2024 1807)  Verbalizes/displays adequate comfort level or baseline comfort level:   Encourage patient to monitor pain and request assistance   Assess pain using appropriate pain scale   Administer analgesics based on type and severity of pain and evaluate response   Implement non-pharmacological measures as appropriate and evaluate response   Consider cultural and social influences on pain and pain management   Notify Licensed Independent Practitioner if interventions unsuccessful or patient reports new pain     Problem: Skin/Tissue Integrity  Goal: Absence of new skin breakdown  Description: 1.  Monitor for areas of redness and/or skin breakdown  2.  Assess vascular access sites hourly  3.  Every 4-6 hours minimum:  Change oxygen saturation probe site  4.  Every 4-6 hours:  If on nasal continuous positive airway pressure, respiratory therapy assess nares and determine need for appliance change or resting period.  Outcome: Progressing     Problem: Safety - Adult  Goal: Free from fall injury  4/5/2024 1807 by Beverly Don, RN  Outcome:

## 2024-04-05 NOTE — PROGRESS NOTES
SBA/CGA during functional transfers/mobility using RW. Pt SBA during toileting task. Pt req'd ~8 min to get to EOB and pt kept stating \"Just give me a minute.\" Pt very slow moving requiring extra time for all tasks. Pt remains limited by global weakness, decreased activity tolerance and pain. Skilled OT indicated to increase safety and IND with all functional tasks to ensure a safe return to PLOF.  Activity Tolerance: Patient tolerated treatment well;Patient limited by fatigue;Patient limited by pain;Patient limited by endurance  Discharge Recommendations: Patient would benefit from continued therapy after discharge  Safety Devices  Safety Devices in place: Yes  Type of devices: All fall risk precautions in place;Left in bed;Bed alarm in place;Call light within reach;Nurse notified;Gait belt;Patient at risk for falls    Patient Education  Education  Education Given To: Patient  Education Provided: Mobility Training;Fall Prevention Strategies;Transfer Training;Energy Conservation;Precautions;Safety;Equipment  Education Method: Verbal;Teach Back  Barriers to Learning: None  Education Outcome: Verbalized understanding;Demonstrated understanding;Continued education needed    Plan  Occupational Therapy Plan  Times Per Week: 4-5x/week  Current Treatment Recommendations: Strengthening;Balance training;Functional mobility training;Safety education & training;Endurance training;Patient/Caregiver education & training;Equipment evaluation, education, & procurement;Positioning;Self-Care / ADL    Goals  Patient Goals   Patient goals : to go home  Short Term Goals  Time Frame for Short Term Goals: by discharge, pt will  Short Term Goal 1: demo S/MI with ADL transfers with AD/DME as needed with good safety, pacing  Short Term Goal 2: demo S/MI with functional mob in room distances with AD as needed with good safety, pacing for ADL completion  Short Term Goal 3: demo S/MI with UB/LB ADLs with AE/DME as needed with good safety,  pacing  Short Term Goal 4: demo S/MI with toileting routine with DME as needed with good safety  Short Term Goal 5: demo and verb good understanding of fall prevention techs, EC/WS techs, equip needs, B UE HEP, d/c recommendations    AM-PAC Score        AM-PAC Inpatient Daily Activity Raw Score: 19 (04/05/24 1340)  AM-PAC Inpatient ADL T-Scale Score : 40.22 (04/05/24 1340)  ADL Inpatient CMS 0-100% Score: 42.8 (04/05/24 1340)  ADL Inpatient CMS G-Code Modifier : CK (04/05/24 1340)      Therapy Time   Individual Concurrent Group Co-treatment   Time In 1259         Time Out 1325         Minutes 26                 Kori Moreland CINDY

## 2024-04-06 VITALS
SYSTOLIC BLOOD PRESSURE: 151 MMHG | HEART RATE: 85 BPM | DIASTOLIC BLOOD PRESSURE: 78 MMHG | OXYGEN SATURATION: 95 % | WEIGHT: 228.8 LBS | RESPIRATION RATE: 15 BRPM | TEMPERATURE: 98.2 F | BODY MASS INDEX: 34.79 KG/M2

## 2024-04-06 PROBLEM — N13.2 HYDRONEPHROSIS CONCURRENT WITH AND DUE TO CALCULI OF KIDNEY AND URETER: Status: RESOLVED | Noted: 2024-04-02 | Resolved: 2024-04-06

## 2024-04-06 LAB
ANION GAP SERPL CALCULATED.3IONS-SCNC: 8 MMOL/L (ref 9–17)
BASOPHILS # BLD: 0.03 K/UL (ref 0–0.2)
BASOPHILS NFR BLD: 0 % (ref 0–2)
BUN SERPL-MCNC: 28 MG/DL (ref 8–23)
BUN/CREAT SERPL: 35 (ref 9–20)
CALCIUM SERPL-MCNC: 8.4 MG/DL (ref 8.6–10.4)
CHLORIDE SERPL-SCNC: 107 MMOL/L (ref 98–107)
CO2 SERPL-SCNC: 20 MMOL/L (ref 20–31)
CREAT SERPL-MCNC: 0.8 MG/DL (ref 0.5–0.9)
EOSINOPHIL # BLD: 0.04 K/UL (ref 0–0.44)
EOSINOPHILS RELATIVE PERCENT: 0 % (ref 1–4)
ERYTHROCYTE [DISTWIDTH] IN BLOOD BY AUTOMATED COUNT: 14.6 % (ref 11.8–14.4)
GFR SERPL CREATININE-BSD FRML MDRD: 81 ML/MIN/1.73M2
GLUCOSE BLD-MCNC: 179 MG/DL (ref 65–105)
GLUCOSE BLD-MCNC: 233 MG/DL (ref 65–105)
GLUCOSE BLD-MCNC: 238 MG/DL (ref 65–105)
GLUCOSE SERPL-MCNC: 231 MG/DL (ref 70–99)
HCT VFR BLD AUTO: 32 % (ref 36.3–47.1)
HGB BLD-MCNC: 10.3 G/DL (ref 11.9–15.1)
IMM GRANULOCYTES # BLD AUTO: 0.07 K/UL (ref 0–0.3)
IMM GRANULOCYTES NFR BLD: 1 %
LYMPHOCYTES NFR BLD: 1.21 K/UL (ref 1.1–3.7)
LYMPHOCYTES RELATIVE PERCENT: 13 % (ref 24–43)
MCH RBC QN AUTO: 31.2 PG (ref 25.2–33.5)
MCHC RBC AUTO-ENTMCNC: 32.2 G/DL (ref 28.4–34.8)
MCV RBC AUTO: 97 FL (ref 82.6–102.9)
MONOCYTES NFR BLD: 0.62 K/UL (ref 0.1–1.2)
MONOCYTES NFR BLD: 7 % (ref 3–12)
NEUTROPHILS NFR BLD: 79 % (ref 36–65)
NEUTS SEG NFR BLD: 7.36 K/UL (ref 1.5–8.1)
NRBC BLD-RTO: 0 PER 100 WBC
PLATELET # BLD AUTO: 129 K/UL (ref 138–453)
PMV BLD AUTO: 12.7 FL (ref 8.1–13.5)
POTASSIUM SERPL-SCNC: 4.5 MMOL/L (ref 3.7–5.3)
RBC # BLD AUTO: 3.3 M/UL (ref 3.95–5.11)
RBC # BLD: ABNORMAL 10*6/UL
SODIUM SERPL-SCNC: 135 MMOL/L (ref 135–144)
STONE COMPOSITION: NORMAL
STONE DESCRIPTION: NORMAL
STONE MASS: 6 MG
WBC OTHER # BLD: 9.3 K/UL (ref 3.5–11.3)

## 2024-04-06 PROCEDURE — 6370000000 HC RX 637 (ALT 250 FOR IP): Performed by: UROLOGY

## 2024-04-06 PROCEDURE — 6360000002 HC RX W HCPCS: Performed by: UROLOGY

## 2024-04-06 PROCEDURE — 97110 THERAPEUTIC EXERCISES: CPT

## 2024-04-06 PROCEDURE — 80048 BASIC METABOLIC PNL TOTAL CA: CPT

## 2024-04-06 PROCEDURE — 82947 ASSAY GLUCOSE BLOOD QUANT: CPT

## 2024-04-06 PROCEDURE — 2580000003 HC RX 258: Performed by: UROLOGY

## 2024-04-06 PROCEDURE — 99232 SBSQ HOSP IP/OBS MODERATE 35: CPT | Performed by: STUDENT IN AN ORGANIZED HEALTH CARE EDUCATION/TRAINING PROGRAM

## 2024-04-06 PROCEDURE — 36415 COLL VENOUS BLD VENIPUNCTURE: CPT

## 2024-04-06 PROCEDURE — 6360000002 HC RX W HCPCS: Performed by: STUDENT IN AN ORGANIZED HEALTH CARE EDUCATION/TRAINING PROGRAM

## 2024-04-06 PROCEDURE — 97116 GAIT TRAINING THERAPY: CPT

## 2024-04-06 PROCEDURE — 94761 N-INVAS EAR/PLS OXIMETRY MLT: CPT

## 2024-04-06 PROCEDURE — 6370000000 HC RX 637 (ALT 250 FOR IP): Performed by: STUDENT IN AN ORGANIZED HEALTH CARE EDUCATION/TRAINING PROGRAM

## 2024-04-06 PROCEDURE — 85025 COMPLETE CBC W/AUTO DIFF WBC: CPT

## 2024-04-06 PROCEDURE — 94640 AIRWAY INHALATION TREATMENT: CPT

## 2024-04-06 RX ORDER — LEVOFLOXACIN 750 MG/1
750 TABLET, FILM COATED ORAL DAILY
Qty: 3 TABLET | Refills: 0 | Status: SHIPPED | OUTPATIENT
Start: 2024-04-07 | End: 2024-04-10

## 2024-04-06 RX ORDER — OXYBUTYNIN CHLORIDE 5 MG/1
5 TABLET ORAL 3 TIMES DAILY PRN
Qty: 90 TABLET | Refills: 3 | Status: SHIPPED | OUTPATIENT
Start: 2024-04-06

## 2024-04-06 RX ORDER — PREDNISONE 10 MG/1
TABLET ORAL
Qty: 18 TABLET | Refills: 0 | Status: SHIPPED | OUTPATIENT
Start: 2024-04-06

## 2024-04-06 RX ORDER — ALBUTEROL SULFATE 2.5 MG/3ML
2.5 SOLUTION RESPIRATORY (INHALATION) EVERY 4 HOURS PRN
Status: DISCONTINUED | OUTPATIENT
Start: 2024-04-06 | End: 2024-04-06 | Stop reason: HOSPADM

## 2024-04-06 RX ADMIN — Medication 10 MG: at 02:15

## 2024-04-06 RX ADMIN — INSULIN LISPRO 4 UNITS: 100 INJECTION, SOLUTION INTRAVENOUS; SUBCUTANEOUS at 12:45

## 2024-04-06 RX ADMIN — INSULIN LISPRO 4 UNITS: 100 INJECTION, SOLUTION INTRAVENOUS; SUBCUTANEOUS at 17:39

## 2024-04-06 RX ADMIN — METOPROLOL TARTRATE 25 MG: 25 TABLET, FILM COATED ORAL at 08:16

## 2024-04-06 RX ADMIN — ASPIRIN 81 MG: 81 TABLET, COATED ORAL at 08:16

## 2024-04-06 RX ADMIN — SODIUM CHLORIDE: 9 INJECTION, SOLUTION INTRAVENOUS at 12:03

## 2024-04-06 RX ADMIN — SODIUM CHLORIDE: 9 INJECTION, SOLUTION INTRAVENOUS at 03:56

## 2024-04-06 RX ADMIN — PREDNISONE 20 MG: 20 TABLET ORAL at 08:16

## 2024-04-06 RX ADMIN — ALBUTEROL SULFATE 2.5 MG: 2.5 SOLUTION RESPIRATORY (INHALATION) at 07:46

## 2024-04-06 RX ADMIN — LEVOFLOXACIN 750 MG: 750 TABLET, FILM COATED ORAL at 08:16

## 2024-04-06 RX ADMIN — PANTOPRAZOLE SODIUM 40 MG: 40 TABLET, DELAYED RELEASE ORAL at 06:07

## 2024-04-06 RX ADMIN — MORPHINE SULFATE 2 MG: 2 INJECTION, SOLUTION INTRAMUSCULAR; INTRAVENOUS at 08:15

## 2024-04-06 RX ADMIN — INSULIN HUMAN 30 UNITS: 100 INJECTION, SUSPENSION SUBCUTANEOUS at 08:16

## 2024-04-06 RX ADMIN — PANTOPRAZOLE SODIUM 40 MG: 40 TABLET, DELAYED RELEASE ORAL at 17:39

## 2024-04-06 RX ADMIN — ATORVASTATIN CALCIUM 80 MG: 80 TABLET, FILM COATED ORAL at 08:16

## 2024-04-06 ASSESSMENT — ENCOUNTER SYMPTOMS
COUGH: 0
RHINORRHEA: 0
CHEST TIGHTNESS: 0
CONSTIPATION: 0
SHORTNESS OF BREATH: 0
ABDOMINAL PAIN: 0
DIARRHEA: 0
NAUSEA: 0
VOMITING: 0
WHEEZING: 0
BLOOD IN STOOL: 0

## 2024-04-06 ASSESSMENT — PAIN - FUNCTIONAL ASSESSMENT: PAIN_FUNCTIONAL_ASSESSMENT: PREVENTS OR INTERFERES SOME ACTIVE ACTIVITIES AND ADLS

## 2024-04-06 ASSESSMENT — PAIN SCALES - GENERAL
PAINLEVEL_OUTOF10: 7
PAINLEVEL_OUTOF10: 7
PAINLEVEL_OUTOF10: 0
PAINLEVEL_OUTOF10: 0

## 2024-04-06 ASSESSMENT — PAIN DESCRIPTION - ORIENTATION: ORIENTATION: LOWER;RIGHT

## 2024-04-06 ASSESSMENT — PAIN DESCRIPTION - DESCRIPTORS: DESCRIPTORS: ACHING

## 2024-04-06 ASSESSMENT — PAIN DESCRIPTION - LOCATION
LOCATION: BACK
LOCATION: THROAT

## 2024-04-06 NOTE — PLAN OF CARE
No change overnight  Pt reports feeling much better today, able to ambulate to bathroom SBA w walker  Cough/throat irritation managed with prn cough drops effectively  Free of pain, falls, nausea and headache overnight  Pt reports she understands she needs rehab as she is still weak requiring walker, ambulates independently at home   Plan to start DC planning, continue Levaquin abx and possible rehab discussed, pt agreeable  All questions answered, no needs at this time, wcm    Problem: Discharge Planning  Goal: Discharge to home or other facility with appropriate resources  4/6/2024 0556 by Mathieu Cheung, RN  Outcome: Progressing  Flowsheets (Taken 4/5/2024 1900)  Discharge to home or other facility with appropriate resources:   Identify barriers to discharge with patient and caregiver   Arrange for needed discharge resources and transportation as appropriate   Identify discharge learning needs (meds, wound care, etc)   Refer to discharge planning if patient needs post-hospital services based on physician order or complex needs related to functional status, cognitive ability or social support system     Problem: Pain  Goal: Verbalizes/displays adequate comfort level or baseline comfort level  4/6/2024 0556 by Mathieu Cheung, RN  Outcome: Progressing     Problem: Skin/Tissue Integrity  Goal: Absence of new skin breakdown  Description: 1.  Monitor for areas of redness and/or skin breakdown  2.  Assess vascular access sites hourly  3.  Every 4-6 hours minimum:  Change oxygen saturation probe site  4.  Every 4-6 hours:  If on nasal continuous positive airway pressure, respiratory therapy assess nares and determine need for appliance change or resting period.  4/6/2024 0556 by Mathieu Cheung, RN  Outcome: Progressing     Problem: Safety - Adult  Goal: Free from fall injury  4/6/2024 0556 by Mathieu Cheung, RN  Outcome: Progressing     Problem: Gastrointestinal - Adult  Goal: Minimal or absence of nausea and

## 2024-04-06 NOTE — PLAN OF CARE
Problem: Respiratory - Adult  Goal: Achieves optimal ventilation and oxygenation  4/6/2024 0751 by Mine Christianson RCP  Outcome: Progressing     Problem: Respiratory - Adult  Goal: Able to breathe comfortably  Description: Able to breathe comfortably  4/6/2024 0751 by Mine Christianson, TRETN  Outcome: Progressing     Problem: Respiratory - Adult  Goal: Clear lung sounds  4/6/2024 0751 by Mine Christianson, MANAP  Outcome: Progressing

## 2024-04-06 NOTE — PROGRESS NOTES
04/05/24  0613 04/05/24  1843   * 133* 133*   K 3.8 3.9 4.4    104 106   CO2 20 20 19*   GLUCOSE 190* 269* 236*   BUN 55* 42* 32*   CREATININE 1.4* 1.1* 0.9   CALCIUM 8.1* 8.3* 8.1*       No results for input(s): \"PROT\", \"LABALBU\", \"LABA1C\", \"T7CHKTC\", \"D6FHRSS\", \"FT4\", \"TSH\", \"AST\", \"ALT\", \"LDH\", \"GGT\", \"ALKPHOS\", \"BILITOT\", \"BILIDIR\", \"AMMONIA\", \"AMYLASE\", \"LIPASE\", \"LACTATE\", \"CHOL\", \"HDL\", \"LDLCHOLESTEROL\", \"CHOLHDLRATIO\", \"TRIG\", \"VLDL\", \"BNP\", \"TROPONINI\", \"CKTOTAL\", \"CKMB\", \"CKMBINDEX\", \"RF\", \"FABIÁN\" in the last 72 hours.         Specific Gravity, UA   Date Value Ref Range Status   04/02/2024 1.027 1.005 - 1.030 Corrected     Comment:     CORRECTED ON 04/03 AT 0609: PREVIOUSLY REPORTED AS >1.030     Protein, UA   Date Value Ref Range Status   04/02/2024 3+ (A) NEGATIVE mg/dL Final     RBC, UA   Date Value Ref Range Status   04/02/2024 10 TO 20 0 - 2 /HPF Final     Blood, UA POC   Date Value Ref Range Status   04/21/2022 Mod  Final     Bacteria, UA   Date Value Ref Range Status   04/02/2024 FEW (A) None Final     Nitrite, Urine   Date Value Ref Range Status   04/02/2024 NEGATIVE NEGATIVE Final     WBC, UA   Date Value Ref Range Status   04/02/2024 TOO NUMEROUS TO COUNT 0 - 5 /HPF Final     Leukocyte Esterase, Urine   Date Value Ref Range Status   04/02/2024 LARGE (A) NEGATIVE Final       Imaging / Clinical Data :-   XR CHEST PORTABLE   Final Result   Perihilar and left basilar consolidation. Findings could be due to pulmonary   edema and/or multifocal pneumonia in the appropriate clinical setting.  The   findings are stable from prior examination.         FLUORO FOR SURGICAL PROCEDURES   Final Result      XR CHEST PORTABLE   Final Result   Ill-defined patchy and streaky opacities in the left lower lung field in the   area of the lingula, likely due to combination of pre-existing   fibrosis/atelectasis and the lingula of left lung and left cardiophrenic fat   pad as also noted on previous CT scan. But  any new mild superimposed   atelectasis or fibrosis in this area not excluded.      Top-normal cardiac size.  No evidence of cardiac decompensation.      No pleural effusion or pneumothorax.         CT ABDOMEN PELVIS WO CONTRAST Additional Contrast? None   Final Result   1. Mild right-sided hydroureteronephrosis with distal right ureter calculus   measuring 6 mm.   2. Nonobstructive right renal calculus measuring 5 mm.   3. Atelectasis and focal bronchiectatic changes seen in the lingular lobe.              Clinical Course : stable  Assessment and Plan  :        Sepsis secondary to UTI vs PNA  S/p IV antibiotics, continue IV fluid. Continue IV fluid.  Blood cultures negative so far.  Urine cultures growing E. coli.  Elevated Pro-Deshawn, chest x-ray concerning for multifocal pneumonia.  Started her on IV Levaquin   GRACIELA: Resolved  Secondary to sepsis and, obstructive uropathy, continue IV fluids, urology on board, s/p cystoscopy, holmium lithotripsy, right ureteral stone biopsies getting and right stent placement.  Baseline kidney function normal.   R kidney stone with Hydroureteronephrosis -s/p cystoscopy, holmium laser lithotripsy, right ureteral stone basketing and right stent placement by urology..  Type 2 DM - on insulin, high-dose sliding scale.  Hypoglycemia protocol monitor for hypoglycemia.  Patient is on NPH 30 units subcu.  She takes metformin, semaglutide and NPH 30 units at home.  Constipation: Bowel regimen as per protocol.  Will reassess tomorrow morning for discharge      Continue to monitor vitals , Intake / output ,  Cell count , HGB , Kidney function, oxygenation  as indicated .   Plan and updates discussed with patient ,  answers  explained to satisfaction.   Plan discussed with Staff Santos MORAN     (Please note that portions of this note were completed with a voice recognition program. Efforts were made to edit the dictations but occasionally words are mis-transcribed.)      Moses Fallon,

## 2024-04-06 NOTE — DISCHARGE INSTR - COC
Continuity of Care Form    Patient Name: Verena Elliott   :  1955  MRN:  [id number]    Admit date:  2024  Discharge date:  24    Code Status Order: Full Code   Advance Directives:     Admitting Physician:  Jun Chinchilla MD  PCP: Donal Cook DO    Discharging Nurse: DEAN Perez  Discharging Hospital Unit/Room#: 1003/1003-02  Discharging Unit Phone Number: [phone]    Emergency Contact:   Extended Emergency Contact Information  Primary Emergency Contact: Celia Elliott   [facility]  Home Phone: [phone]  Relation: Child  Secondary Emergency Contact: Patrice Elliott  Home Phone: [phone]  Mobile Phone: [phone]  Relation: Child    Past Surgical History:  Past Surgical History:   Procedure Laterality Date     SECTION, LOW TRANSVERSE      X 2    CYSTOSCOPY Right 4/3/2024    CYSTOSCOPY , RIGHT URETERAL STENT INSERTION, LASER LITHROTRIPSY, URETEROSCOPY, STONE BASKETING performed by Hunter Park MD at [facility] OR    KNEE SURGERY Left        Immunization History:   Immunization History   Administered Date(s) Administered    COVID-19, MODERNA BLUE border, Primary or Immunocompromised, (age 12y+), IM, 100 mcg/0.5mL 2021, 04/10/2021, 2022    Influenza 2013    Influenza Virus Vaccine 2009, 10/27/2014, 10/01/2015    Influenza, AFLURIA (age 3 yrs+), FLUZONE, (age 6 mo+), MDV, 0.5mL 2016, 2018, 2019    Influenza, FLUARIX, FLULAVAL, FLUZONE (age 6 mo+) AND AFLURIA, (age 3 y+), PF, 0.5mL 2020    Pneumococcal, PPSV23, PNEUMOVAX 23, (age 2y+), SC/IM, 0.5mL 2016    TDaP, ADACEL (age 10y-64y), BOOSTRIX (age 10y+), IM, 0.5mL 2013       Active Problems:  Patient Active Problem List   Diagnosis Code    Dyslipidemia E78.5    Diabetes mellitus (HCC) E11.9    Chronic urticaria L50.8    ASHD (arteriosclerotic heart disease) I25.10    Primary osteoarthritis involving multiple joints M15.9    Elevated liver enzymes R74.8

## 2024-04-06 NOTE — PROGRESS NOTES
Physical Therapy  Facility/Department: [facility] PROGRESSIVE CARE  Daily Treatment Note  NAME: Verena Elliott  : 1955  MRN: [id number]    Date of Service: 2024    Discharge Recommendations:           Patient Diagnosis(es): The primary encounter diagnosis was Urinary tract infection without hematuria, site unspecified. Diagnoses of GRACIELA (acute kidney injury) (HCC), Kidney stone, and Ureteral calculus were also pertinent to this visit.    Assessment   Assessment: Patient tolerated treatment well with some encouragement; educated patient on importance of sitting up in chair at end of session; patient performed gait training with CGA and slow lamont due to decreased activity tolerance, cues to relax shoulders throughout. Patient would benefit from continuted therapy to promote increased return to prior level.     Plan    Physical Therapy Plan  General Plan: 5-7 times per week  Specific Instructions for Next Treatment: progress gait- pt. is normally indep but needing RW at this time. Continue to assess if needed for safe discharge.  Current Treatment Recommendations: Strengthening; ROM; Balance training; Functional mobility training; Transfer training; Endurance training; Gait training; Stair training; Home exercise program; Safety education & training; Patient/Caregiver education & training; Therapeutic activities     Restrictions  Restrictions/Precautions  Restrictions/Precautions: Fall Risk, General Precautions, Up as Tolerated  Required Braces or Orthoses?: No  Position Activity Restriction  Other position/activity restrictions: IV R UE, cont SPO2 monitor     Subjective    Subjective  Subjective: Patient in the restroom with PCA upon arrival; Ana reports patient appropiate for therapy and patient is agreeable.  Orientation  Overall Orientation Status: Within Functional Limits  Cognition  Overall Cognitive Status: Exceptions  Arousal/Alertness: Appropriate responses to stimuli;Delayed responses to

## 2024-04-06 NOTE — PROGRESS NOTES
04/05/24  0613 04/05/24  1843 04/06/24  0503   * 133* 135   K 3.9 4.4 4.5    106 107   CO2 20 19* 20   GLUCOSE 269* 236* 231*   BUN 42* 32* 28*   CREATININE 1.1* 0.9 0.8   CALCIUM 8.3* 8.1* 8.4*       No results for input(s): \"PROT\", \"LABALBU\", \"LABA1C\", \"H8EOGJD\", \"Q8XJOHQ\", \"FT4\", \"TSH\", \"AST\", \"ALT\", \"LDH\", \"GGT\", \"ALKPHOS\", \"BILITOT\", \"BILIDIR\", \"AMMONIA\", \"AMYLASE\", \"LIPASE\", \"LACTATE\", \"CHOL\", \"HDL\", \"LDLCHOLESTEROL\", \"CHOLHDLRATIO\", \"TRIG\", \"VLDL\", \"BNP\", \"TROPONINI\", \"CKTOTAL\", \"CKMB\", \"CKMBINDEX\", \"RF\", \"FABIÁN\" in the last 72 hours.         Specific Gravity, UA   Date Value Ref Range Status   04/02/2024 1.027 1.005 - 1.030 Corrected     Comment:     CORRECTED ON 04/03 AT 0609: PREVIOUSLY REPORTED AS >1.030     Protein, UA   Date Value Ref Range Status   04/02/2024 3+ (A) NEGATIVE mg/dL Final     RBC, UA   Date Value Ref Range Status   04/02/2024 10 TO 20 0 - 2 /HPF Final     Blood, UA POC   Date Value Ref Range Status   04/21/2022 Mod  Final     Bacteria, UA   Date Value Ref Range Status   04/02/2024 FEW (A) None Final     Nitrite, Urine   Date Value Ref Range Status   04/02/2024 NEGATIVE NEGATIVE Final     WBC, UA   Date Value Ref Range Status   04/02/2024 TOO NUMEROUS TO COUNT 0 - 5 /HPF Final     Leukocyte Esterase, Urine   Date Value Ref Range Status   04/02/2024 LARGE (A) NEGATIVE Final       Imaging / Clinical Data :-   XR CHEST PORTABLE   Final Result   Perihilar and left basilar consolidation. Findings could be due to pulmonary   edema and/or multifocal pneumonia in the appropriate clinical setting.  The   findings are stable from prior examination.         FLUORO FOR SURGICAL PROCEDURES   Final Result      XR CHEST PORTABLE   Final Result   Ill-defined patchy and streaky opacities in the left lower lung field in the   area of the lingula, likely due to combination of pre-existing   fibrosis/atelectasis and the lingula of left lung and left cardiophrenic fat   pad as also noted on previous

## 2024-04-06 NOTE — RT PROTOCOL NOTE
RT Inhaler-Nebulizer Bronchodilator Protocol Note    There is a bronchodilator order in the chart from a provider indicating to follow the RT Bronchodilator Protocol and there is an “Initiate RT Inhaler-Nebulizer Bronchodilator Protocol” order as well (see protocol at bottom of note).    CXR Findings:  XR CHEST PORTABLE    Result Date: 4/5/2024  Perihilar and left basilar consolidation. Findings could be due to pulmonary edema and/or multifocal pneumonia in the appropriate clinical setting.  The findings are stable from prior examination.       The findings from the last RT Protocol Assessment were as follows:   History Pulmonary Disease: Smoker 15 pack years or more  Respiratory Pattern: Dyspnea on exertion or RR 21-25 bpm  Breath Sounds: Clear breath sounds  Cough: Strong, spontaneous, non-productive  Indication for Bronchodilator Therapy: On home bronchodilators  Bronchodilator Assessment Score: 3    Aerosolized bronchodilator medication orders have been revised according to the RT Inhaler-Nebulizer Bronchodilator Protocol below.    Respiratory Therapist to perform RT Therapy Protocol Assessment initially then follow the protocol.  Repeat RT Therapy Protocol Assessment PRN for score 0-3 or on second treatment, BID, and PRN for scores above 3.    No Indications - adjust the frequency to every 6 hours PRN wheezing or bronchospasm, if no treatments needed after 48 hours then discontinue using Per Protocol order mode.     If indication present, adjust the RT bronchodilator orders based on the Bronchodilator Assessment Score as indicated below.  Use Inhaler orders unless patient has one or more of the following: on home nebulizer, not able to hold breath for 10 seconds, is not alert and oriented, cannot activate and use MDI correctly, or respiratory rate 25 breaths per minute or more, then use the equivalent nebulizer order(s) with same Frequency and PRN reasons based on the score.  If a patient is on this medication

## 2024-04-06 NOTE — PLAN OF CARE
Pt alert and oriented x4, currently on room air. Pt to be discharged tonight, awaiting DME walker to be delivered. Safety maintained throughout shift, call light within reach.   Problem: Discharge Planning  Goal: Discharge to home or other facility with appropriate resources  4/6/2024 1330 by Ana Jorge RN  Outcome: Progressing     Problem: Pain  Goal: Verbalizes/displays adequate comfort level or baseline comfort level  4/6/2024 1330 by Ana Jorge RN  Outcome: Progressing     Problem: Skin/Tissue Integrity  Goal: Absence of new skin breakdown  Description: 1.  Monitor for areas of redness and/or skin breakdown  2.  Assess vascular access sites hourly  3.  Every 4-6 hours minimum:  Change oxygen saturation probe site  4.  Every 4-6 hours:  If on nasal continuous positive airway pressure, respiratory therapy assess nares and determine need for appliance change or resting period.  4/6/2024 1330 by Ana Jorge RN  Outcome: Progressing     Problem: Safety - Adult  Goal: Free from fall injury  4/6/2024 1330 by Ana Jorge RN  Outcome: Progressing     Problem: Gastrointestinal - Adult  Goal: Minimal or absence of nausea and vomiting  4/6/2024 1330 by Ana Jorge RN  Outcome: Progressing     Problem: Infection - Adult  Goal: Absence of infection at discharge  4/6/2024 1330 by Ana Jorge RN  Outcome: Progressing     Problem: Metabolic/Fluid and Electrolytes - Adult  Goal: Electrolytes maintained within normal limits  4/6/2024 1330 by Ana Jorge RN  Outcome: Progressing     Problem: Respiratory - Adult  Goal: Achieves optimal ventilation and oxygenation  4/6/2024 1330 by Ana Jorge RN  Outcome: Progressing     Problem: Chronic Conditions and Co-morbidities  Goal: Patient's chronic conditions and co-morbidity symptoms are monitored and maintained or improved  4/6/2024 1330 by Ana Jorge RN  Outcome: Progressing     Problem: Genitourinary - Adult  Goal: Absence of urinary retention  4/6/2024  1330 by Ana Jorge, RN  Outcome: Progressing

## 2024-04-06 NOTE — PROGRESS NOTES
Physician Progress Note      PATIENT:               KELSEY HALLMAN  [facility] #:                  [id number]  :                       1955  ADMIT DATE:       2024 6:42 PM  DISCH DATE:  RESPONDING  PROVIDER #:        Moses Fallon MD          QUERY TEXT:    Pt admitted with Hydronephrosis with obstructive nephropathy and UTI. 4/3 IM   note states \"Sepsis secondary to UTI\". On admission patient noted to have WBC   14.3, RR 20s, and GRACIELA. If possible, please document in progress notes and   discharge summary the present on admission status of Sepsis:    The medical record reflects the following:  Risk Factors: Hydronephrosis with obstructive nephropathy and UTI, advanced   age, diabetes  Clinical Indicators: 4/3 IM note states \"Sepsis secondary to UTI\" Patient   noted to have WBC 14.3, RR 20s, and GRACIELA  Treatment: Labs, IV antibiotics, IV fluids, imaging    Thank you,  Tiffani Brooks RN BSN  Clinical   Options provided:  -- Yes, Sepsis was present at the time of the order to admit to the hospital  -- No, Sepsis was not present on admission and developed during the inpatient   stay  -- Other - I will add my own diagnosis  -- Disagree - Not applicable / Not valid  -- Disagree - Clinically unable to determine / Unknown  -- Refer to Clinical Documentation Reviewer    PROVIDER RESPONSE TEXT:    Yes, Sepsis was present at the time of the order to admit to the hospital.    Query created by: Tiffani Brooks on 2024 9:57 AM      Electronically signed by:  Moses Fallon MD 2024 1:52 PM

## 2024-04-06 NOTE — PLAN OF CARE
Verena Elliott was evaluated today and a DME order was entered for a wheeled walker because she requires this to successfully complete daily living tasks of bathing, toileting, personal cares, ambulating, grooming, and hygiene.  A wheeled walker is necessary due to the patient's unsteady gait, upper body weakness, and inability to  an ambulation device; and she can ambulate only by pushing a walker instead of a lesser assistive device such as a cane, crutch, or standard walker.  The need for this equipment was discussed with the patient and she understands and is in agreement.      Moses Fallon MD

## 2024-04-06 NOTE — CARE COORDINATION
Post Acute Facility/Agency List     Provided patient with the following list, the list includes the overall star ratings obtained from CMS per the Medicare Web site (www.Medicare.gov):     [] Long Term Acute Care Facilities  [] Acute Inpatient Rehabilitation Facilities  [] Skilled Nursing Facilities  [x] Home Care    Provided verbal instructions on how to utilize the QR Code to obtain additional detailed star ratings from www.Medicare.gov     offered to print and provide the detailed list:    []Accepted   [x]Declined    Plan is home with [facility]. Walker will be delivered to patient's room before discharge.

## 2024-04-06 NOTE — DISCHARGE INSTR - DIET

## 2024-04-06 NOTE — PLAN OF CARE
Problem: Respiratory - Adult  Goal: Achieves optimal ventilation and oxygenation  4/5/2024 2017 by Mejia Baker RCP  Outcome: Progressing     Problem: Respiratory - Adult  Goal: Able to breathe comfortably  Description: Able to breathe comfortably  4/5/2024 2017 by Mejia Baker RCP  Outcome: Progressing     Problem: Respiratory - Adult  Goal: Clear lung sounds  4/5/2024 2017 by Mejia Baker RCP  Outcome: Progressing

## 2024-04-06 NOTE — PROGRESS NOTES
Pt discharged home with boyfriend from [facility]. Pt meds in bag, home walker given, IV and telemetry removed. Pt to have home meds delivered home, home care with [facility]. All questions answered, no needs prior to discharge, vitals checked and stable for DC.

## 2024-04-07 LAB
MICROORGANISM SPEC CULT: NORMAL
MICROORGANISM SPEC CULT: NORMAL
SERVICE CMNT-IMP: NORMAL
SERVICE CMNT-IMP: NORMAL
SPECIMEN DESCRIPTION: NORMAL
SPECIMEN DESCRIPTION: NORMAL

## 2024-04-09 ENCOUNTER — TELEPHONE (OUTPATIENT)
Dept: FAMILY MEDICINE CLINIC | Age: 69
End: 2024-04-09

## 2024-04-09 NOTE — TELEPHONE ENCOUNTER
Care Transitions Initial Follow Up Call    Outreach made within 2 business days of discharge: Yes    Patient: Verena Elliott Patient : 1955   MRN: [id number]  Reason for Admission: There are no discharge diagnoses documented for the most recent discharge.  Discharge Date: 24       Spoke with: Mariel  Spoke with patient and she had follow up with Urology yesterday and got Stent taken out. Says that home nurse also came and PT is being set up. Patient does not need to follow up per Dr Cook since following with urology.    Discharge department/facility: [facility] Interactive Patient Contact:  Was patient able to fill all prescriptions: Yes  Was patient instructed to bring all medications to the follow-up visit: Yes  Is patient taking all medications as directed in the discharge summary? Yes  Does patient understand their discharge instructions: Yes  Does patient have questions or concerns that need addressed prior to 7-14 day follow up office visit: no    Scheduled appointment with PCP within 7-14 days    Follow Up  Future Appointments   Date Time Provider Department Center   2024  9:00 AM Sally Cruz RPH [facility]   2024  2:20 PM Donal Cook DO [facility]       Yue Zavala MA

## 2024-04-13 NOTE — DISCHARGE SUMMARY
[phone] - F [phone] [address] [facility] [address]      Phone: [phone]   levoFLOXacin 750 MG tablet  oxyBUTYnin 5 MG tablet  predniSONE 10 MG tablet         Time spent on discharge is 31 mins in patient examination, evaluation, counseling as well as medication reconciliation, prescriptions for required medications, discharge plan and follow up.    Electronically signed by   Moses Fallon MD  4/12/2024  11:30 PM      Thank you Donal Oliveira DO for the opportunity to be involved in this patient's care.

## 2024-04-17 ENCOUNTER — TELEPHONE (OUTPATIENT)
Dept: FAMILY MEDICINE CLINIC | Age: 69
End: 2024-04-17

## 2024-04-17 NOTE — TELEPHONE ENCOUNTER
Medication Management Service ([facility])  [facility]  [phone]     CLINICAL PHARMACY NOTE:      Message received from office that patient has recently been hospitalized.  Started on a prednisone taper and noticing blood glucose readings being elevated. Noted patient discharged on 04/06/2024.     Spoke with patient by telephone.  Pt reports that she has 1 day left of the prednisone taper. Discharge medications were sent to patient's mail order so she need to wait a couple of days to start the prednisone which is why she still has 1 days remaining.  Patient also reports low readings since leaving the hospital. Talked of having difficulty with keeping medications down.  Has been eating bowl of cereal with blue berries daily in the morning.  Lowest reading noted to be 64mg/dL. Patient has been checking blood glucose 3x per day and is reporting readings at goal since stopping NPH therapy.  No additional lows noted.  Patient has not taken any NPH for the last week.  In addition, patient has missed 2 doses of Ozempic 2 mg weekly and decreased metformin XR to 500mg daily.  zempic is normally taken on Mondays.      Other changes made has been patient is eating small more frequent meals (4-5 meals/day).      Patient reports having PCP post hospital follow up appt scheduled for Friday, 04/19/2024.  Planning to discussing blood glucose readings and restarting DM medications.  Discussed how it might be appropriate to consider starting Ozempic at lower dose since patient has missed 2 doses.  Additional would recommend consideration if insulin therapy is needed, perhaps switching to new insulin like insulin degludec (Tresiba).  Patient would then be able to return to insulin administration via pen versus vial.  Patient is enrolled into the [employer] for 2024.  Currently gets the NPH through the program.  Pt would be able to get Tresiba along with pen needles through this PAP.      Would recommend starting with office

## 2024-04-19 ENCOUNTER — OFFICE VISIT (OUTPATIENT)
Dept: FAMILY MEDICINE CLINIC | Age: 69
End: 2024-04-19

## 2024-04-19 VITALS
DIASTOLIC BLOOD PRESSURE: 62 MMHG | HEART RATE: 78 BPM | OXYGEN SATURATION: 98 % | BODY MASS INDEX: 30.71 KG/M2 | WEIGHT: 202 LBS | SYSTOLIC BLOOD PRESSURE: 138 MMHG

## 2024-04-19 DIAGNOSIS — Z09 HOSPITAL DISCHARGE FOLLOW-UP: ICD-10-CM

## 2024-04-19 DIAGNOSIS — N10 ACUTE PYELONEPHRITIS: ICD-10-CM

## 2024-04-19 DIAGNOSIS — J45.20 MILD INTERMITTENT ASTHMA WITHOUT COMPLICATION: ICD-10-CM

## 2024-04-19 DIAGNOSIS — A41.50 SEPSIS DUE TO GRAM-NEGATIVE URINARY TRACT INFECTION (HCC): Primary | ICD-10-CM

## 2024-04-19 DIAGNOSIS — N13.2 HYDRONEPHROSIS WITH URINARY OBSTRUCTION DUE TO RENAL CALCULUS: ICD-10-CM

## 2024-04-19 DIAGNOSIS — E11.9 TYPE 2 DIABETES MELLITUS WITHOUT COMPLICATION, UNSPECIFIED WHETHER LONG TERM INSULIN USE (HCC): ICD-10-CM

## 2024-04-19 DIAGNOSIS — N39.0 SEPSIS DUE TO GRAM-NEGATIVE URINARY TRACT INFECTION (HCC): Primary | ICD-10-CM

## 2024-04-19 DIAGNOSIS — N20.0 NEPHROLITHIASIS: ICD-10-CM

## 2024-04-19 RX ORDER — SYRINGE AND NEEDLE,INSULIN,1ML 30 GX5/16"
SYRINGE, EMPTY DISPOSABLE MISCELLANEOUS
COMMUNITY
Start: 2024-03-21

## 2024-04-19 ASSESSMENT — ENCOUNTER SYMPTOMS
HEMOPTYSIS: 0
HOARSE VOICE: 0
FREQUENT THROAT CLEARING: 0
CHEST TIGHTNESS: 0
VISUAL CHANGE: 0
DIFFICULTY BREATHING: 0
RESPIRATORY NEGATIVE: 1
EYES NEGATIVE: 1
WHEEZING: 0
ALLERGIC/IMMUNOLOGIC NEGATIVE: 1
BLURRED VISION: 0
GASTROINTESTINAL NEGATIVE: 1
SPUTUM PRODUCTION: 0

## 2024-04-19 NOTE — TELEPHONE ENCOUNTER
I feel like she will want to get back to her original regimen since it was working, but I wish she'd switch over since it would probably be easier to manage

## 2024-04-19 NOTE — TELEPHONE ENCOUNTER
Sounds good thank you Sally. So if everything is controlled she'll only be on Metformin XR 500mg daily?

## 2024-04-19 NOTE — PROGRESS NOTES
and atraumatic.   Eyes:      General: No scleral icterus.        Right eye: No discharge.         Left eye: No discharge.      Extraocular Movements: Extraocular movements intact.      Conjunctiva/sclera: Conjunctivae normal.   Cardiovascular:      Rate and Rhythm: Normal rate and regular rhythm.      Pulses: Normal pulses.      Heart sounds: Normal heart sounds. No murmur heard.     No friction rub. No gallop.   Pulmonary:      Effort: Pulmonary effort is normal. No respiratory distress.      Breath sounds: Normal breath sounds. No stridor. No wheezing, rhonchi or rales.   Chest:      Chest wall: No tenderness.   Neurological:      Mental Status: She is alert and oriented to person, place, and time. Mental status is at baseline.   Psychiatric:         Mood and Affect: Mood normal.         Behavior: Behavior normal.         Thought Content: Thought content normal.         Judgment: Judgment normal.         An electronic signature was used to authenticate this note.  --Donal Cook, DO

## 2024-04-19 NOTE — TELEPHONE ENCOUNTER
Just saw Mariel this morning and she's taking her Metformin BID and has taken Novolin once. I also told her that long term I expect her to land somewhere in between what she is on now and all the meds she was on previously.

## 2024-04-19 NOTE — PATIENT INSTRUCTIONS
Patient Education        Counting Carbohydrates for Diabetes: Care Instructions  Overview     Managing the amount of carbohydrate (carbs) you eat is an important part of planning healthy meals when you have diabetes. Carbs raise blood sugar more than any other nutrient. Carbs are found in grains, starchy vegetables, fruits, and milk and yogurt. Carbs are also found in sugar-sweetened foods and drinks.  The more carbs you eat at one time, the higher your blood sugar will rise. Counting carbs can help you keep your blood sugar within your target range.  If you use insulin, counting carbs helps you match the right amount of insulin to the number of grams of carbs in a meal.  Follow-up care is a key part of your treatment and safety. Be sure to make and go to all appointments, and call your doctor if you are having problems. It's also a good idea to know your test results and keep a list of the medicines you take.  How can you care for yourself at home?  Know your daily amount of carbohydrates  Your daily amount depends on several things, such as your weight, how active you are, which diabetes medicines you take, and what your goals are for your blood sugar levels. A registered dietitian or diabetes educator can help you plan how many carbs to include in each meal and snack.  For most adults, a guideline for the daily amount of carbs is:  45 to 60 grams at each meal. That's about the same as 3 to 4 carbohydrate servings.  15 to 20 grams at each snack. That's about the same as 1 carbohydrate serving.  Count carbs  Counting carbs lets you know how much rapid-acting insulin to take before you eat. If you use an insulin pump, you get a constant rate of insulin during the day. So the pump must be programmed at meals. This gives you extra insulin to cover the rise in blood sugar after meals.  If you take insulin:  Learn your own insulin-to-carb ratio. You and your diabetes health professional will figure out the ratio. You

## 2024-04-19 NOTE — ASSESSMENT & PLAN NOTE
Borderline controlled, continue current medications, continue current treatment plan, medication adherence emphasized, and right now on Metformin BID and novolin 10units when she needs it  Follow up with Sally our pharmacist with call/message on Monday

## 2024-04-22 ENCOUNTER — TELEPHONE (OUTPATIENT)
Dept: FAMILY MEDICINE CLINIC | Age: 69
End: 2024-04-22

## 2024-04-22 NOTE — TELEPHONE ENCOUNTER
Patient states she went back to work today things are good.  Meds are good  She worked 5 hours instead of 4 with no issues

## 2024-04-25 ENCOUNTER — TELEPHONE (OUTPATIENT)
Dept: FAMILY MEDICINE CLINIC | Age: 69
End: 2024-04-25

## 2024-04-25 NOTE — TELEPHONE ENCOUNTER
Patient calling for update short term disability paper work she dropped off 04/19. I did advise patient that it takes 7-14 for paperwork and she said you told her it would be done this week. Please advise

## 2024-05-20 ENCOUNTER — SCHEDULED TELEPHONE ENCOUNTER (OUTPATIENT)
Dept: FAMILY MEDICINE CLINIC | Age: 69
End: 2024-05-20

## 2024-05-20 DIAGNOSIS — E11.9 TYPE 2 DIABETES MELLITUS WITHOUT COMPLICATION, UNSPECIFIED WHETHER LONG TERM INSULIN USE (HCC): Primary | ICD-10-CM

## 2024-05-20 PROCEDURE — 99999 PR OFFICE/OUTPT VISIT,PROCEDURE ONLY: CPT | Performed by: PHARMACIST

## 2024-05-20 PROCEDURE — APPNB45 APP NON BILLABLE 31-45 MINUTES: Performed by: PHARMACIST

## 2024-05-20 RX ORDER — SEMAGLUTIDE 0.68 MG/ML
INJECTION, SOLUTION SUBCUTANEOUS
Qty: 3 ML | Refills: 0 | COMMUNITY
Start: 2024-05-20

## 2024-05-21 NOTE — PROGRESS NOTES
Sounds great thank you  
page 1, average number of scans and glucose levels per day, and snapshot     The following brochure(s)/handout(s) discussed with patient during visit:       [] What is diabetes   [] Checking your blood sugar   [] Know your numbers   [] Hypoglycemia symptoms and management/Hyperglycemia symptoms   [] Blood glucose log sheet(s)   [] Planning Healthy Meals   [] My Plate   [] DM snacks   [] One Week-DM meal plan   [] Building a balanced meal   [] FreeStyle Santino 2 - Get Started    [] Today's Ambulatory Glucose Profile (AGP) Report    Follow-Up:   Follow up with PharmD on 05/202024. Follow up with PCP on 09/13/2024    Future Considerations:   Consider changing NPH insulin to Tresiba-Per Lexmellissamp Tresiba would be substituted on a 1:1 basis for once daily basal insulin. Might start with office sample if available to see how patient does with the Tresiba.  Once Tresiba dose determined will notify the PAP.      Patient verbalized understanding of care plan. Patient advised to call Medication Management with any questions, concerns, or changes prior to next appointment.    Progress note sent to referring provider. (Dr Cook)   Patient acknowledges working in a consult agreement with the pharmacist as referred by his/her physician.     Sally Cruz, Pharm.D., [facility]  Clinical Pharmacist  [facility] Medication Management Service  [phone]  5/20/2024  9:07 AM      ==================================================================    For Pharmacy Admin Tracking Only    Program: Medical Group  CPA in place:  Yes  Time Spent (min): 45

## 2024-06-17 ENCOUNTER — SCHEDULED TELEPHONE ENCOUNTER (OUTPATIENT)
Dept: FAMILY MEDICINE CLINIC | Age: 69
End: 2024-06-17

## 2024-06-17 DIAGNOSIS — Z79.899 MEDICATION MANAGEMENT: Primary | ICD-10-CM

## 2024-06-17 PROCEDURE — 99999 PR OFFICE/OUTPT VISIT,PROCEDURE ONLY: CPT | Performed by: PHARMACIST

## 2024-06-17 PROCEDURE — APPNB45 APP NON BILLABLE 31-45 MINUTES: Performed by: PHARMACIST

## 2024-06-17 NOTE — PROGRESS NOTES
Medication Management Service  [facility]  [phone]    Verena Elliott is a 69 y.o. female that presents for a follow-up diabetes mellitus telephone visit with Medication Management Service per referral from Dr. Cook, Donal Lamb,  for Diabetes Management under Collaborative Practice Agreement with A1c goal < 7.0 %. Patient PMH includes ASCVD, Asthma, T2DM, Chronic urticaria, osteoarthritis, HLD, microalbuminuria. Complications from DM Include ASCVD and microalbuminuria .     Subjective/Objective     New Problems/Changes since last visit:   Patient appointment today for review of blood glucose & adherence with DM medications.  Patient reports that she has restarted Ozempic at 1mg weekly.  Patient did not  office sample so that she could titrate back up to the 2mg dose.  Patient believes she has taken at least 3 doses at the 1mg weekly dose.      Continues with NPH 60 units daily and Metformin XR-2 tablets daily.     Continues with part-time work schedule.  Short days on Monday and Thursday or Friday.  Then longer day on Tuesday.  Mentioned how she enjoys being retired and working around 15-20 hours per weekly.  Continues with payroll and accounts payable.      LIFESTYLE  Diet:  Most days patient reports not being hungry.  Eat breakfast and lunch.  May not eat dinner.  Will have a snack or two during the day. Salads, meat and potatoes, casseroles, etc.  Likes the frozen cokes on occasion.  Stopped pop, now drinks sugar free tea. Shared that sugar drinks no longer appetizing.   Admits to not eating right due to stress. Reports more pre-prepped foods from [employer].  Likes stir lake which comes with rice.  Overall no changes.     Physical Activity: No formal exercise routine. It does not look like she will be able to open her pool this year.  Anticipating less swimming this year.  Previously shared that she enjoys swimming during the summer months.      WEIGHT  Weight: 91.6 kg  BMI: 30.71 kg/m2

## 2024-07-11 ENCOUNTER — OFFICE VISIT (OUTPATIENT)
Dept: FAMILY MEDICINE CLINIC | Age: 69
End: 2024-07-11
Payer: COMMERCIAL

## 2024-07-11 ENCOUNTER — HOSPITAL ENCOUNTER (OUTPATIENT)
Age: 69
Discharge: HOME | End: 2024-07-13
Payer: COMMERCIAL

## 2024-07-11 ENCOUNTER — HOSPITAL ENCOUNTER (OUTPATIENT)
Age: 69
Setting detail: SPECIMEN
Discharge: HOME | End: 2024-07-11

## 2024-07-11 ENCOUNTER — HOSPITAL ENCOUNTER (OUTPATIENT)
Dept: GENERAL RADIOLOGY | Age: 69
Discharge: HOME | End: 2024-07-13
Payer: COMMERCIAL

## 2024-07-11 VITALS
BODY MASS INDEX: 31.55 KG/M2 | TEMPERATURE: 97 F | HEART RATE: 82 BPM | SYSTOLIC BLOOD PRESSURE: 130 MMHG | HEIGHT: 68 IN | OXYGEN SATURATION: 98 % | DIASTOLIC BLOOD PRESSURE: 74 MMHG | WEIGHT: 208.2 LBS

## 2024-07-11 DIAGNOSIS — M25.562 ACUTE PAIN OF LEFT KNEE: ICD-10-CM

## 2024-07-11 DIAGNOSIS — L65.9 HAIR LOSS: ICD-10-CM

## 2024-07-11 DIAGNOSIS — R19.4 CHANGE IN BOWEL HABITS: ICD-10-CM

## 2024-07-11 DIAGNOSIS — N17.9 AKI (ACUTE KIDNEY INJURY) (HCC): ICD-10-CM

## 2024-07-11 DIAGNOSIS — M25.562 ACUTE PAIN OF LEFT KNEE: Primary | ICD-10-CM

## 2024-07-11 DIAGNOSIS — R19.7 DIARRHEA, UNSPECIFIED TYPE: ICD-10-CM

## 2024-07-11 LAB
ALBUMIN SERPL-MCNC: 3.9 G/DL (ref 3.5–5.2)
ALBUMIN/GLOB SERPL: 1 {RATIO} (ref 1–2.5)
ALP SERPL-CCNC: 76 U/L (ref 35–104)
ALT SERPL-CCNC: 27 U/L (ref 10–35)
ANION GAP SERPL CALCULATED.3IONS-SCNC: 11 MMOL/L (ref 9–16)
AST SERPL-CCNC: 27 U/L (ref 10–35)
BASOPHILS # BLD: 0.04 K/UL (ref 0–0.2)
BASOPHILS NFR BLD: 0 % (ref 0–2)
BILIRUB SERPL-MCNC: 0.4 MG/DL (ref 0–1.2)
BUN SERPL-MCNC: 18 MG/DL (ref 8–23)
CALCIUM SERPL-MCNC: 9.8 MG/DL (ref 8.6–10.4)
CHLORIDE SERPL-SCNC: 106 MMOL/L (ref 98–107)
CO2 SERPL-SCNC: 22 MMOL/L (ref 20–31)
CREAT SERPL-MCNC: 0.7 MG/DL (ref 0.5–0.9)
EOSINOPHIL # BLD: 0.5 K/UL (ref 0–0.44)
EOSINOPHILS RELATIVE PERCENT: 5 % (ref 1–4)
ERYTHROCYTE [DISTWIDTH] IN BLOOD BY AUTOMATED COUNT: 13 % (ref 11.8–14.4)
GFR, ESTIMATED: >90 ML/MIN/1.73M2
GLUCOSE SERPL-MCNC: 162 MG/DL (ref 74–99)
HCT VFR BLD AUTO: 44.9 % (ref 36.3–47.1)
HGB BLD-MCNC: 13.9 G/DL (ref 11.9–15.1)
IMM GRANULOCYTES # BLD AUTO: 0.03 K/UL (ref 0–0.3)
IMM GRANULOCYTES NFR BLD: 0 %
LYMPHOCYTES NFR BLD: 2.33 K/UL (ref 1.1–3.7)
LYMPHOCYTES RELATIVE PERCENT: 24 % (ref 24–43)
MCH RBC QN AUTO: 31.2 PG (ref 25.2–33.5)
MCHC RBC AUTO-ENTMCNC: 31 G/DL (ref 28.4–34.8)
MCV RBC AUTO: 100.7 FL (ref 82.6–102.9)
MONOCYTES NFR BLD: 0.72 K/UL (ref 0.1–1.2)
MONOCYTES NFR BLD: 7 % (ref 3–12)
NEUTROPHILS NFR BLD: 64 % (ref 36–65)
NEUTS SEG NFR BLD: 6.05 K/UL (ref 1.5–8.1)
NRBC BLD-RTO: 0 PER 100 WBC
O+P STL CONC: NORMAL
PLATELET # BLD AUTO: 197 K/UL (ref 138–453)
PMV BLD AUTO: 12.2 FL (ref 8.1–13.5)
POTASSIUM SERPL-SCNC: 4.3 MMOL/L (ref 3.7–5.3)
PROT SERPL-MCNC: 7.1 G/DL (ref 6.6–8.7)
RBC # BLD AUTO: 4.46 M/UL (ref 3.95–5.11)
SODIUM SERPL-SCNC: 139 MMOL/L (ref 136–145)
SPECIMEN DESCRIPTION: NORMAL
TSH SERPL DL<=0.05 MIU/L-ACNC: 1.63 UIU/ML (ref 0.27–4.2)
WBC OTHER # BLD: 9.7 K/UL (ref 3.5–11.3)

## 2024-07-11 PROCEDURE — 99214 OFFICE O/P EST MOD 30 MIN: CPT | Performed by: NURSE PRACTITIONER

## 2024-07-11 PROCEDURE — 1123F ACP DISCUSS/DSCN MKR DOCD: CPT | Performed by: NURSE PRACTITIONER

## 2024-07-11 PROCEDURE — 73562 X-RAY EXAM OF KNEE 3: CPT

## 2024-07-11 ASSESSMENT — ENCOUNTER SYMPTOMS
ANAL BLEEDING: 0
VOMITING: 0
BLOOD IN STOOL: 0
DIARRHEA: 1
RESPIRATORY NEGATIVE: 1
ALLERGIC/IMMUNOLOGIC NEGATIVE: 1
EYES NEGATIVE: 1
COLOR CHANGE: 0
ABDOMINAL PAIN: 1
NAUSEA: 0

## 2024-07-11 ASSESSMENT — PATIENT HEALTH QUESTIONNAIRE - PHQ9
SUM OF ALL RESPONSES TO PHQ QUESTIONS 1-9: 1
1. LITTLE INTEREST OR PLEASURE IN DOING THINGS: NOT AT ALL
SUM OF ALL RESPONSES TO PHQ QUESTIONS 1-9: 1
SUM OF ALL RESPONSES TO PHQ9 QUESTIONS 1 & 2: 1
SUM OF ALL RESPONSES TO PHQ QUESTIONS 1-9: 1
2. FEELING DOWN, DEPRESSED OR HOPELESS: SEVERAL DAYS
SUM OF ALL RESPONSES TO PHQ QUESTIONS 1-9: 1

## 2024-07-11 NOTE — PROGRESS NOTES
[facility]  [address] [place] [address]  Dept: [phone]    Verena Elliott is a 69 y.o. female who presents today for her medical conditions/complaintsas noted below.      Verena Elliott is here today c/o Knee Pain (Left for the past few months.)    Past Medical History:   Diagnosis Date    CAD (coronary artery disease)     s/p stent -    Diabetes mellitus (HCC)     Dyslipidemia     Urticaria, chronic       Past Surgical History:   Procedure Laterality Date     SECTION, LOW TRANSVERSE      X 2    CYSTOSCOPY Right 4/3/2024    CYSTOSCOPY , RIGHT URETERAL STENT INSERTION, LASER LITHROTRIPSY, URETEROSCOPY, STONE BASKETING performed by Hunter Park MD at [facility] OR    KNEE SURGERY Left        Family History   Problem Relation Age of Onset    High Cholesterol Father     High Blood Pressure Father     Heart Disease Father     Diabetes Father     High Cholesterol Sister     High Blood Pressure Sister        Social History     Tobacco Use    Smoking status: Former     Current packs/day: 0.00     Average packs/day: 1 pack/day for 22.5 years (22.5 ttl pk-yrs)     Types: Cigarettes     Start date:      Quit date: 2012     Years since quittin.0    Smokeless tobacco: Never   Substance Use Topics    Alcohol use: No      Current Outpatient Medications   Medication Sig Dispense Refill    TRUEPLUS INSULIN SYRINGE 30G X \" 1 ML MISC       oxyBUTYnin (DITROPAN) 5 MG tablet Take 1 tablet by mouth 3 times daily as needed (bladder spasms) 90 tablet 3    insulin NPH (HUMULIN N;NOVOLIN N) 100 UNIT/ML injection vial Inject 60 Units into the skin every morning      atorvastatin (LIPITOR) 80 MG tablet Take 1 tablet by mouth daily 90 tablet 3    famotidine (PEPCID) 40 MG tablet Take 1 tablet by mouth every evening 90 tablet 3    metoprolol tartrate (LOPRESSOR) 25 MG tablet Take 1 tablet by mouth daily 90 tablet 3    metFORMIN (GLUCOPHAGE-XR) 500 MG extended release tablet

## 2024-07-12 ENCOUNTER — HOSPITAL ENCOUNTER (OUTPATIENT)
Age: 69
Setting detail: SPECIMEN
Discharge: HOME | End: 2024-07-12

## 2024-07-12 DIAGNOSIS — E11.9 TYPE 2 DIABETES MELLITUS WITHOUT COMPLICATION, UNSPECIFIED WHETHER LONG TERM INSULIN USE (HCC): ICD-10-CM

## 2024-07-12 DIAGNOSIS — E78.5 DYSLIPIDEMIA: ICD-10-CM

## 2024-07-12 LAB
C DIFF GDH + TOXINS A+B STL QL IA.RAPID: NEGATIVE
C PARVUM AG STL QL IA: NEGATIVE
CAMPYLOBACTER DNA SPEC NAA+PROBE: NORMAL
CHOLEST SERPL-MCNC: 112 MG/DL (ref 0–199)
CHOLESTEROL/HDL RATIO: 3
CREAT UR-MCNC: 145 MG/DL (ref 28–217)
ETEC ELTA+ESTB GENES STL QL NAA+PROBE: NORMAL
G LAMBLIA AG STL QL IA: NEGATIVE
HDLC SERPL-MCNC: 37 MG/DL
LDLC SERPL CALC-MCNC: 48 MG/DL (ref 0–100)
MICROALBUMIN UR-MCNC: 42 MG/L (ref 0–20)
MICROALBUMIN/CREAT UR-RTO: 29 MCG/MG CREAT (ref 0–25)
P SHIGELLOIDES DNA STL QL NAA+PROBE: NORMAL
SALMONELLA DNA SPEC QL NAA+PROBE: NORMAL
SHIGA TOXIN STX GENE SPEC NAA+PROBE: NORMAL
SHIGELLA DNA SPEC QL NAA+PROBE: NORMAL
SOURCE: NORMAL
SPECIMEN DESCRIPTION: NORMAL
TRIGL SERPL-MCNC: 135 MG/DL
V CHOL+PARA RFBL+TRKH+TNAA STL QL NAA+PR: NORMAL
VLDLC SERPL CALC-MCNC: 27 MG/DL
Y ENTERO RECN STL QL NAA+PROBE: NORMAL

## 2024-07-17 DIAGNOSIS — E11.9 TYPE 2 DIABETES MELLITUS WITHOUT COMPLICATION, UNSPECIFIED WHETHER LONG TERM INSULIN USE (HCC): Primary | ICD-10-CM

## 2024-07-17 RX ORDER — SYRINGE AND NEEDLE,INSULIN,1ML 30 GX5/16"
SYRINGE, EMPTY DISPOSABLE MISCELLANEOUS
Qty: 100 EACH | Refills: 5 | Status: SHIPPED | OUTPATIENT
Start: 2024-07-17

## 2024-07-17 NOTE — TELEPHONE ENCOUNTER
Verena Elliott is calling to request a refill on the following medication(s):    Last Visit Date (If Applicable):  4/19/2024    Next Visit Date:    7/23/2024    Medication Request:  Requested Prescriptions     Pending Prescriptions Disp Refills    TRUEPLUS INSULIN SYRINGE 30G X 5/16\" 1 ML MISC [Pharmacy Med Name: TRUE PLUS INS SYR 1ML 30GX 5/16] 100 each      Sig: USE AS DIRECTED FOR INSULIN EVERY DAY

## 2024-07-23 ENCOUNTER — OFFICE VISIT (OUTPATIENT)
Dept: FAMILY MEDICINE CLINIC | Age: 69
End: 2024-07-23
Payer: COMMERCIAL

## 2024-07-23 VITALS — BODY MASS INDEX: 32.39 KG/M2 | WEIGHT: 213 LBS | SYSTOLIC BLOOD PRESSURE: 128 MMHG | DIASTOLIC BLOOD PRESSURE: 78 MMHG

## 2024-07-23 DIAGNOSIS — R23.8 SCALP IRRITATION: ICD-10-CM

## 2024-07-23 DIAGNOSIS — M25.562 ACUTE PAIN OF LEFT KNEE: ICD-10-CM

## 2024-07-23 DIAGNOSIS — M17.12 PRIMARY OSTEOARTHRITIS OF LEFT KNEE: ICD-10-CM

## 2024-07-23 DIAGNOSIS — E11.9 TYPE 2 DIABETES MELLITUS WITHOUT COMPLICATION, UNSPECIFIED WHETHER LONG TERM INSULIN USE (HCC): ICD-10-CM

## 2024-07-23 DIAGNOSIS — M25.462 EFFUSION OF LEFT KNEE: ICD-10-CM

## 2024-07-23 DIAGNOSIS — E78.5 DYSLIPIDEMIA: ICD-10-CM

## 2024-07-23 DIAGNOSIS — L65.9 HAIR LOSS: Primary | ICD-10-CM

## 2024-07-23 PROBLEM — A41.50 SEPSIS DUE TO GRAM-NEGATIVE URINARY TRACT INFECTION (HCC): Status: RESOLVED | Noted: 2024-04-03 | Resolved: 2024-07-23

## 2024-07-23 PROBLEM — N17.9 AKI (ACUTE KIDNEY INJURY) (HCC): Status: RESOLVED | Noted: 2024-07-11 | Resolved: 2024-07-23

## 2024-07-23 PROBLEM — R74.8 ELEVATED LIVER ENZYMES: Status: RESOLVED | Noted: 2021-03-29 | Resolved: 2024-07-23

## 2024-07-23 PROBLEM — N10 ACUTE PYELONEPHRITIS: Status: RESOLVED | Noted: 2024-04-03 | Resolved: 2024-07-23

## 2024-07-23 PROBLEM — N39.0 SEPSIS DUE TO GRAM-NEGATIVE URINARY TRACT INFECTION (HCC): Status: RESOLVED | Noted: 2024-04-03 | Resolved: 2024-07-23

## 2024-07-23 PROBLEM — D69.6 THROMBOCYTOPENIA (HCC): Status: RESOLVED | Noted: 2024-04-03 | Resolved: 2024-07-23

## 2024-07-23 LAB — HBA1C MFR BLD: 6.6 %

## 2024-07-23 PROCEDURE — 3044F HG A1C LEVEL LT 7.0%: CPT | Performed by: FAMILY MEDICINE

## 2024-07-23 PROCEDURE — 99214 OFFICE O/P EST MOD 30 MIN: CPT | Performed by: FAMILY MEDICINE

## 2024-07-23 PROCEDURE — 1123F ACP DISCUSS/DSCN MKR DOCD: CPT | Performed by: FAMILY MEDICINE

## 2024-07-23 PROCEDURE — 83036 HEMOGLOBIN GLYCOSYLATED A1C: CPT | Performed by: FAMILY MEDICINE

## 2024-07-23 ASSESSMENT — ENCOUNTER SYMPTOMS
BLURRED VISION: 0
ALLERGIC/IMMUNOLOGIC NEGATIVE: 1
RESPIRATORY NEGATIVE: 1
EYES NEGATIVE: 1
URTICARIA: 1
GASTROINTESTINAL NEGATIVE: 1
VISUAL CHANGE: 0

## 2024-07-23 NOTE — PROGRESS NOTES
AP[facility] Progress Note    Date:7/23/2024         Patient Name:Verena Elliott     YOB: 1955     Age:69 y.o.    Assessment/Plan        Problem List Items Addressed This Visit          Endocrine    Diabetes mellitus (HCC)      Well-controlled, continue current medications, continue current treatment plan, medication adherence emphasized, and lifestyle modifications recommended         Relevant Orders    POCT glycosylated hemoglobin (Hb A1C) (Completed)       Musculoskeletal and Integument    Primary osteoarthritis of left knee      Borderline controlled, changes made today: PT         Relevant Orders    [facility]       Other    Dyslipidemia      At goal, continue current medications, continue current treatment plan, medication adherence emphasized, and lifestyle modifications recommended          Other Visit Diagnoses       Hair loss    -  Primary    REviewed lab works    Relevant Orders    Love Gaffney MD, Dermatology, [place]    Scalp irritation        Relevant Orders    Love Gaffney MD, Dermatology, [place]    Acute pain of left knee        Relevant Orders    [facility]    Effusion of left knee        REviewed XR  From OA             Return if symptoms worsen or fail to improve.    Electronically signed by Donal Cook DO on 7/23/24       Total time spent was between Time personally spent assessing and managing the patient on the date of service: Est: 30-39 minutes (90150) mins. This included time spent reviewing the patient's medical record (e.g., recent visits, labs, and studies); seeing the patient in the office (face-to-face time); ordering medications, studies, procedures, or referrals; calling the patient or family later in the day with results and further recommendations; and documenting the visit in the medical record.     Subjective     Verena Elliott is a 69 y.o. female presenting today for   Chief Complaint   Patient

## 2024-07-25 ENCOUNTER — HOSPITAL ENCOUNTER (OUTPATIENT)
Dept: PHYSICAL THERAPY | Facility: CLINIC | Age: 69
Setting detail: THERAPIES SERIES
Discharge: HOME | End: 2024-07-25
Payer: COMMERCIAL

## 2024-07-25 PROCEDURE — 97161 PT EVAL LOW COMPLEX 20 MIN: CPT

## 2024-07-25 PROCEDURE — 97110 THERAPEUTIC EXERCISES: CPT

## 2024-07-25 NOTE — CONSULTS
[] [facility]  Outpatient Rehabilitation &  Therapy  [address]  [phone]  [phone] [x] [facility]  Outpatient Rehabilitation &  Therapy  [address]  P: [phone]  F: [phone] [] [facility]  Outpatient Rehabilitation &  Therapy  [address]  P: [phone]  F: [phone] [] [facility]  Outpatient Rehabilitation &  Therapy  [address]  [phone]  [phone] [] [facility]  Outpatient Rehabilitation &  Therapy  [address]   P: [phone]  F: [phone]  [] [facility]  Outpatient Rehabilitation &  Therapy  [address]  P: [phone]  F: [phone] [] [facility]  Outpatient Rehabilitation &  Therapy  [address]  P: [phone]  F: [phone] [] [facility]  Outpatient Rehabilitation &  Therapy  [address]  P: [phone]  F: [phone] [] [facility]  Outpatient Rehabilitation &  Therapy  [address]  P: [phone]  F: [phone]  [] [facility] Outpatient Rehabilitation &  Therapy  [address]  P: [phone]  F: [phone]     Physical Therapy Lower Extremity Evaluation    Date:  2024  Patient: Verena Elliott  : 1955  MRN: [id number]  Physician: Donal Cook DO   Insurance: [employer] O (Auth after 12) BMN  Medical Diagnosis:   M17.12 (ICD-10-CM) - Primary osteoarthritis of left knee   M25.562 (ICD-10-CM) - Acute pain of left knee   Rehab Codes: R26.6  Onset date: 24  Next Dr's appt.: TBTG    Subjective:   CC: Left Knee Pain  HPI: Pt is a 69 year old female presenting to physical therapy for left knee pain. Pt is right handed. Pt reports onset began about a month ago. Pt reports she was walking and twisted it funny and has

## 2024-07-30 ENCOUNTER — OFFICE VISIT (OUTPATIENT)
Dept: DERMATOLOGY | Age: 69
End: 2024-07-30
Payer: COMMERCIAL

## 2024-07-30 ENCOUNTER — HOSPITAL ENCOUNTER (OUTPATIENT)
Age: 69
Setting detail: SPECIMEN
Discharge: HOME | End: 2024-07-30

## 2024-07-30 VITALS
DIASTOLIC BLOOD PRESSURE: 82 MMHG | TEMPERATURE: 97.2 F | SYSTOLIC BLOOD PRESSURE: 150 MMHG | BODY MASS INDEX: 31.78 KG/M2 | OXYGEN SATURATION: 98 % | WEIGHT: 209 LBS | HEART RATE: 89 BPM

## 2024-07-30 DIAGNOSIS — L65.0 TELOGEN EFFLUVIUM: ICD-10-CM

## 2024-07-30 DIAGNOSIS — L65.0 TELOGEN EFFLUVIUM: Primary | ICD-10-CM

## 2024-07-30 LAB
BASOPHILS # BLD: 0.05 K/UL (ref 0–0.2)
BASOPHILS NFR BLD: 1 % (ref 0–2)
EOSINOPHIL # BLD: 0.28 K/UL (ref 0–0.44)
EOSINOPHILS RELATIVE PERCENT: 3 % (ref 1–4)
ERYTHROCYTE [DISTWIDTH] IN BLOOD BY AUTOMATED COUNT: 12.7 % (ref 11.8–14.4)
FERRITIN SERPL-MCNC: 305 NG/ML (ref 13–150)
HCT VFR BLD AUTO: 42.5 % (ref 36.3–47.1)
HGB BLD-MCNC: 13.8 G/DL (ref 11.9–15.1)
IMM GRANULOCYTES # BLD AUTO: <0.03 K/UL (ref 0–0.3)
IMM GRANULOCYTES NFR BLD: 0 %
IRON SATN MFR SERPL: 30 % (ref 20–55)
IRON SERPL-MCNC: 88 UG/DL (ref 37–145)
LYMPHOCYTES NFR BLD: 2.75 K/UL (ref 1.1–3.7)
LYMPHOCYTES RELATIVE PERCENT: 33 % (ref 24–43)
MCH RBC QN AUTO: 30.6 PG (ref 25.2–33.5)
MCHC RBC AUTO-ENTMCNC: 32.5 G/DL (ref 28.4–34.8)
MCV RBC AUTO: 94.2 FL (ref 82.6–102.9)
MONOCYTES NFR BLD: 0.76 K/UL (ref 0.1–1.2)
MONOCYTES NFR BLD: 9 % (ref 3–12)
NEUTROPHILS NFR BLD: 54 % (ref 36–65)
NEUTS SEG NFR BLD: 4.56 K/UL (ref 1.5–8.1)
NRBC BLD-RTO: 0 PER 100 WBC
PLATELET # BLD AUTO: 180 K/UL (ref 138–453)
PMV BLD AUTO: 12.1 FL (ref 8.1–13.5)
RBC # BLD AUTO: 4.51 M/UL (ref 3.95–5.11)
TIBC SERPL-MCNC: 293 UG/DL (ref 250–450)
UNSATURATED IRON BINDING CAPACITY: 205 UG/DL (ref 112–347)
WBC OTHER # BLD: 8.4 K/UL (ref 3.5–11.3)

## 2024-07-30 PROCEDURE — 1123F ACP DISCUSS/DSCN MKR DOCD: CPT | Performed by: DERMATOLOGY

## 2024-07-30 PROCEDURE — 99204 OFFICE O/P NEW MOD 45 MIN: CPT | Performed by: DERMATOLOGY

## 2024-07-30 NOTE — PROGRESS NOTES
Dermatology Patient Note  [facility]  [address]  [address]  [place] [address]  Dept: [phone]  Dept Fax: [phone]      VISITDATE: 7/30/2024   REFERRING PROVIDER: Donal Cook, *      Verena Elliott is a 69 y.o. female  who presents today in the office for:    Neck Pain (New patient urgent referral for hair loss x 2-3 mos. Pcp did labs but patient says no reason for the hail loss found. Patient says scalp not itchy and no pain. )      HISTORY OF PRESENT ILLNESS:  As above  She notes extreme hair shedding in shower, lots of hair falling out  Recent labs were normal, no h/o thyroid disease  She was admitted in April for pyelonephritis  She has a h/o chronic urticaria for which she had to take a lot of prednisone, it did eventually resolve  She reports ongoing joint pain and fatigue    MEDICAL PROBLEMS:  Patient Active Problem List    Diagnosis Date Noted    Primary osteoarthritis of left knee 07/23/2024    Change in bowel habits 07/11/2024    Nephrolithiasis 04/19/2024    Mild intermittent asthma without complication 09/12/2023    Primary osteoarthritis involving multiple joints 03/29/2021    Microalbuminuria 03/29/2021    ASHD (arteriosclerotic heart disease) 06/22/2012     Myocardial infarction 2012      Dyslipidemia     Diabetes mellitus (HCC)     Chronic urticaria        CURRENT MEDICATIONS:   Current Outpatient Medications   Medication Sig Dispense Refill    Insulin Syringe-Needle U-100 (TRUEPLUS INSULIN SYRINGE) 30G X 5/16\" 1 ML MISC USE AS DIRECTED FOR INSULIN EVERY  each 5    diclofenac sodium (VOLTAREN) 1 % GEL Apply 4 g topically 4 times daily as needed for Pain 150 g 1    insulin NPH (HUMULIN N;NOVOLIN N) 100 UNIT/ML injection vial Inject 60 Units into the skin every morning      atorvastatin (LIPITOR) 80 MG tablet Take 1 tablet by mouth daily 90 tablet 3    famotidine (PEPCID) 40 MG tablet Take

## 2024-07-31 LAB
ANA SER QL IA: NEGATIVE
DSDNA IGG SER QL IA: 0.9 IU/ML
NUCLEAR IGG SER IA-RTO: 0.2 U/ML

## 2024-08-02 ENCOUNTER — HOSPITAL ENCOUNTER (OUTPATIENT)
Dept: PHYSICAL THERAPY | Facility: CLINIC | Age: 69
Setting detail: THERAPIES SERIES
Discharge: HOME | End: 2024-08-02
Payer: COMMERCIAL

## 2024-08-02 PROCEDURE — 97110 THERAPEUTIC EXERCISES: CPT

## 2024-08-02 NOTE — FLOWSHEET NOTE
patient voiced good tolerance to supine and seated ex's, introduced standing ex's  in order to increase CARLA LE strength with 50/50 WBing, but also to increase tolerance to periods of L SLS with alternating ex performances for improved stance phase during ambulation. Pt. With some challenge during balance activities and needing fingertip touch to remain upright. Held off on L SLS this session d/t Pt. Voicing some increase in her \"twinge\". Ended session with CP application to L knee for post session relief.    [] No change.     [] Other:  [x] Patient would continue to benefit from skilled physical therapy services in order to: decrease left knee pain, increase left hamstring and quad strengthening, walking endurance, and balance to improve overall functional and allow pt to get back to recreational activities.     STG: (to be met in 8 treatments)  ? Pain: Pt must demo <3/10 left knee pain levels to increase overall functional mobility.  ? Strength: Pt must demo 5/5 left hamstring and quadriceps strength to increase ability to perform ADLs/iADLS.  Patient to be independent with home exercise program as demonstrated by performance with correct form without cues.  Pt must demo increased SLS time to over 10 seconds bilaterally to show increased balance for safety at home.     LTG: (to be met in 12 treatments)  Pt must demo <30% functionally impaired with LEFS to increase overall functional mobility.  Pt must demo <1/10 pain levels to increase overall functional mobility.  Pt must demo ability to complete 12 conecutive steps with no increase in pain to show increased functional mobility   Pt must demo ability to complete 6MWT with no increase in pain to show increase in ambulation tolerance.                    Patient goals: To decrease pain    Pt. Education:  [x] Yes  [] No  [x] Reviewed Prior HEP/Ed  Method of Education: [x] Verbal  [x] Demo  [] Written  Comprehension of Education:  [x] Verbalizes understanding.  [x]

## 2024-08-05 ENCOUNTER — HOSPITAL ENCOUNTER (OUTPATIENT)
Dept: PHYSICAL THERAPY | Facility: CLINIC | Age: 69
Setting detail: THERAPIES SERIES
Discharge: HOME | End: 2024-08-05
Payer: COMMERCIAL

## 2024-08-05 PROCEDURE — 97110 THERAPEUTIC EXERCISES: CPT

## 2024-08-05 PROCEDURE — 97016 VASOPNEUMATIC DEVICE THERAPY: CPT

## 2024-08-05 NOTE — FLOWSHEET NOTE
[] [facility]  Outpatient Rehabilitation &  Therapy  [address]  [phone]  [phone] [x] [facility]  Outpatient Rehabilitation &  Therapy  [address]  P: [phone]  F: [phone] [] [facility]  Outpatient Rehabilitation &  Therapy  [address]  P: [phone]  F: [phone] [] [facility]  Outpatient Rehabilitation &  Therapy  [address]  [phone]  [phone] [] [facility]  Outpatient Rehabilitation &  Therapy  [address]   P: [phone]  F: [phone]  [] [facility]  Outpatient Rehabilitation &  Therapy  [address]  P: [phone]  F: [phone] [] [facility]  Outpatient Rehabilitation &  Therapy  [address]  P: [phone]  F: [phone] [] [facility]  Outpatient Rehabilitation &  Therapy  [address]  P: [phone]  F: [phone] [] [facility]  Outpatient Rehabilitation &  Therapy  [address]  P: [phone]  F: [phone]  [] [facility] Outpatient Rehabilitation &  Therapy  [address]  P: [phone]  F: [phone]     Physical Therapy Daily Treatment Note    Date:  2024  Patient Name:  Verena Elliott    :  1955  MRN: [id number]    Physician: Donal Cook DO                               Insurance: [email or web address] O (Auth after ) BMN  Medical Diagnosis:   M17.12 (ICD-10-CM) - Primary osteoarthritis of left knee   M25.562 (ICD-10-CM) - Acute pain of left knee   Rehab Codes: R26.6  Onset date: 24               Next Dr's appt.: TBD    Visit# / total visits:   Frequency:  2 x/week for 12 visits      Cancels/No Shows: 0/0    Subjective:    Pain:  [x] Yes  [] No Location: L knee Pain Rating: (0-10 scale)

## 2024-08-07 ENCOUNTER — HOSPITAL ENCOUNTER (OUTPATIENT)
Dept: PHYSICAL THERAPY | Facility: CLINIC | Age: 69
Setting detail: THERAPIES SERIES
Discharge: HOME | End: 2024-08-07
Payer: COMMERCIAL

## 2024-08-07 PROCEDURE — 97016 VASOPNEUMATIC DEVICE THERAPY: CPT

## 2024-08-07 PROCEDURE — 97110 THERAPEUTIC EXERCISES: CPT

## 2024-08-07 NOTE — FLOWSHEET NOTE
[] [facility]  Outpatient Rehabilitation &  Therapy  [address]  [phone]  [phone] [x] [facility]  Outpatient Rehabilitation &  Therapy  [address]  P: [phone]  F: [phone] [] [facility]  Outpatient Rehabilitation &  Therapy  [address]  P: [phone]  F: [phone] [] [facility]  Outpatient Rehabilitation &  Therapy  [address]  [phone]  [phone] [] [facility]  Outpatient Rehabilitation &  Therapy  [address]   P: [phone]  F: [phone]  [] [facility]  Outpatient Rehabilitation &  Therapy  [address]  P: [phone]  F: [phone] [] [facility]  Outpatient Rehabilitation &  Therapy  [address]  P: [phone]  F: [phone] [] [facility]  Outpatient Rehabilitation &  Therapy  [address]  P: [phone]  F: [phone] [] [facility]  Outpatient Rehabilitation &  Therapy  [address]  P: [phone]  F: [phone]  [] [facility] Outpatient Rehabilitation &  Therapy  [address]  P: [phone]  F: [phone]     Physical Therapy Daily Treatment Note    Date:  2024  Patient Name:  Verena Elliott    :  1955  MRN: [id number]    Physician: Donal Cook DO                               Insurance: [employer] (Auth after ) BMN  Medical Diagnosis:   M17.12 (ICD-10-CM) - Primary osteoarthritis of left knee   M25.562 (ICD-10-CM) - Acute pain of left knee   Rehab Codes: R26.6  Onset date: 24               Next Dr's appt.: TBD    Visit# / total visits:   Frequency:  2 x/week for 12 visits      Cancels/No Shows: 0/0    Subjective:    Pain:  [] Yes  [x] No Location: L knee Pain Rating: (0-10 scale) 0/10  Pain

## 2024-08-12 ENCOUNTER — SCHEDULED TELEPHONE ENCOUNTER (OUTPATIENT)
Dept: FAMILY MEDICINE CLINIC | Age: 69
End: 2024-08-12

## 2024-08-12 ENCOUNTER — HOSPITAL ENCOUNTER (OUTPATIENT)
Dept: PHYSICAL THERAPY | Facility: CLINIC | Age: 69
Setting detail: THERAPIES SERIES
Discharge: HOME | End: 2024-08-12
Payer: COMMERCIAL

## 2024-08-12 DIAGNOSIS — Z79.899 MEDICATION MANAGEMENT: Primary | ICD-10-CM

## 2024-08-12 PROCEDURE — 97110 THERAPEUTIC EXERCISES: CPT

## 2024-08-12 PROCEDURE — 97016 VASOPNEUMATIC DEVICE THERAPY: CPT

## 2024-08-12 PROCEDURE — APPNB45 APP NON BILLABLE 31-45 MINUTES: Performed by: PHARMACIST

## 2024-08-12 PROCEDURE — 99999 PR OFFICE/OUTPT VISIT,PROCEDURE ONLY: CPT | Performed by: PHARMACIST

## 2024-08-12 NOTE — FLOWSHEET NOTE
[] [facility]  Outpatient Rehabilitation &  Therapy  [address]  [phone]  [phone] [x] [facility]  Outpatient Rehabilitation &  Therapy  [address]  P: [phone]  F: [phone] [] [facility]  Outpatient Rehabilitation &  Therapy  [address]  P: [phone]  F: [phone] [] [facility]  Outpatient Rehabilitation &  Therapy  [address]  [phone]  [phone] [] [facility]  Outpatient Rehabilitation &  Therapy  [address]   P: [phone]  F: [phone]  [] [facility]  Outpatient Rehabilitation &  Therapy  [address]  P: [phone]  F: [phone] [] [facility]  Outpatient Rehabilitation &  Therapy  [address]  P: [phone]  F: [phone] [] [facility]  Outpatient Rehabilitation &  Therapy  [address]  P: [phone]  F: [phone] [] [facility]  Outpatient Rehabilitation &  Therapy  [address]  P: [phone]  F: [phone]  [] [facility] Outpatient Rehabilitation &  Therapy  [address]  P: [phone]  F: [phone]     Physical Therapy Daily Treatment Note    Date:  2024  Patient Name:  Verena Elliott    :  1955  MRN: [id number]    Physician: Donal Cook DO                               Insurance: [employer] (Auth after ) BMN  Medical Diagnosis:   M17.12 (ICD-10-CM) - Primary osteoarthritis of left knee   M25.562 (ICD-10-CM) - Acute pain of left knee   Rehab Codes: R26.6  Onset date: 24               Next Dr's appt.: TBD    Visit# / total visits:   Frequency:  2 x/week for 12 visits      Cancels/No Shows: 0/0    Subjective:    Pain:  [] Yes  [x] No Location: L knee Pain Rating: (0-10 scale) 6/10  Pain

## 2024-08-12 NOTE — PROGRESS NOTES
Medication Management Service  [facility]  [phone]    Verena Elliott is a 69 y.o. female that presents for a follow-up diabetes mellitus telephone visit with Medication Management Service per referral from Dr. Cook, Donal Lamb,  for Diabetes Management under Collaborative Practice Agreement with A1c goal < 7.0 %. Patient PMH includes ASCVD, Asthma, T2DM, Chronic urticaria, osteoarthritis, HLD, microalbuminuria. Complications from DM Include ASCVD and microalbuminuria .     Subjective/Objective     New Problems/Changes since last visit:   Patient appointment today for review of blood glucose & adherence with DM medications.      Continues with Ozempic 2mg weekly (Mondays),  NPH 60 units daily and Metformin XR-2 tablets daily.     Continues with part-time work schedule.  Of late has been working Monday & Tuesday.  Short days on Monday and Thursday or Friday.  Then longer day on Tuesday.  Mentioned how she enjoys being retired and working around 15-20 hours per weekly.  Continues with payroll and accounts payable.  Has not worked the last couple of Thursday.  Been having a 5 day weekend.      LIFESTYLE  Diet:  Most days patient reports not being hungry.  Eat breakfast and lunch.  May not eat dinner.  Will have a snack or two during the day. Salads, meat and potatoes, casseroles, etc.  Likes the frozen cokes on occasion.  Stopped pop, now drinks sugar free tea. Shared that sugar drinks no longer appetizing.   Admits to not eating right due to stress. Reports more pre-prepped foods from [employer].  Likes stir lake which comes with rice.  Overall no changes.   Continues to not snack.  Weekends eat between 4-5pm.  Might be reason blood glucose is lower.    Physical Activity: No formal exercise routine. It does not look like she will be able to open her pool this year.  Anticipating less swimming this year.  Previously shared that she enjoys swimming during the summer months.  Been going to PT for knee.

## 2024-08-21 ENCOUNTER — HOSPITAL ENCOUNTER (OUTPATIENT)
Dept: PHYSICAL THERAPY | Facility: CLINIC | Age: 69
Setting detail: THERAPIES SERIES
Discharge: HOME | End: 2024-08-21
Payer: COMMERCIAL

## 2024-08-21 PROCEDURE — 97110 THERAPEUTIC EXERCISES: CPT

## 2024-08-21 PROCEDURE — 97016 VASOPNEUMATIC DEVICE THERAPY: CPT

## 2024-08-21 NOTE — FLOWSHEET NOTE
[] [facility]  Outpatient Rehabilitation &  Therapy  [address]  [phone]  [phone] [x] [facility]  Outpatient Rehabilitation &  Therapy  [address]  P: [phone]  F: [phone] [] [facility]  Outpatient Rehabilitation &  Therapy  [address]  P: [phone]  F: [phone] [] [facility]  Outpatient Rehabilitation &  Therapy  [address]  [phone]  [phone] [] [facility]  Outpatient Rehabilitation &  Therapy  [address]   P: [phone]  F: [phone]  [] [facility]  Outpatient Rehabilitation &  Therapy  [address]  P: [phone]  F: [phone] [] [facility]  Outpatient Rehabilitation &  Therapy  [address]  P: [phone]  F: [phone] [] [facility]  Outpatient Rehabilitation &  Therapy  [address]  P: [phone]  F: [phone] [] [facility]  Outpatient Rehabilitation &  Therapy  [address]  P: [phone]  F: [phone]  [] [facility] Outpatient Rehabilitation &  Therapy  [address]  P: [phone]  F: [phone]     Physical Therapy Daily Treatment Note    Date:  2024  Patient Name:  Verena Elliott    :  1955  MRN: [id number]    Physician: Donal Cook DO                               Insurance: [employer] (Auth after ) BMN  Medical Diagnosis:   M17.12 (ICD-10-CM) - Primary osteoarthritis of left knee   M25.562 (ICD-10-CM) - Acute pain of left knee   Rehab Codes: R26.6  Onset date: 24               Next Dr's appt.: TBD    Visit# / total visits:   Frequency:  2 x/week for 12 visits      Cancels/No Shows: 0/1    Subjective:    Pain:  [] Yes  [x] No Location: L knee Pain Rating: (0-10 scale)\"twinges\"

## 2024-08-29 ENCOUNTER — HOSPITAL ENCOUNTER (OUTPATIENT)
Dept: PHYSICAL THERAPY | Facility: CLINIC | Age: 69
Setting detail: THERAPIES SERIES
Discharge: HOME | End: 2024-08-29
Payer: COMMERCIAL

## 2024-08-29 PROCEDURE — 97110 THERAPEUTIC EXERCISES: CPT

## 2024-08-29 PROCEDURE — 97016 VASOPNEUMATIC DEVICE THERAPY: CPT

## 2024-08-29 NOTE — FLOWSHEET NOTE
would continue to benefit from skilled physical therapy services in order to: decrease left knee pain, increase left hamstring and quad strengthening, walking endurance, and balance to improve overall functional and allow pt to get back to recreational activities.     STG: (to be met in 8 treatments)  ? Pain: Pt must demo <3/10 left knee pain levels to increase overall functional mobility.  ? Strength: Pt must demo 5/5 left hamstring and quadriceps strength to increase ability to perform ADLs/iADLS.  Patient to be independent with home exercise program as demonstrated by performance with correct form without cues.  Pt must demo increased SLS time to over 10 seconds bilaterally to show increased balance for safety at home.     LTG: (to be met in 12 treatments)  Pt must demo <30% functionally impaired with LEFS to increase overall functional mobility.  Pt must demo <1/10 pain levels to increase overall functional mobility.  Pt must demo ability to complete 12 conecutive steps with no increase in pain to show increased functional mobility   Pt must demo ability to complete 6MWT with no increase in pain to show increase in ambulation tolerance.                    Patient goals: To decrease pain    Pt. Education:  [x] Yes  [] No  [x] Reviewed Prior HEP/Ed  Method of Education: [x] Verbal  [x] Demo  [] Written  Comprehension of Education:  [x] Verbalizes understanding.  [] Demonstrates understanding.  [] Needs review.  [x] Demonstrates/verbalizes HEP/Ed previously given.     Access Code: [email or web address]  URL: [email or web address]  Date: 07/25/2024  Prepared by: Susan Stahl     Exercises  - Sitting Knee Extension with Resistance  - 1 x daily - 7 x weekly - 3 sets - 10 reps  - Seated Hamstring Curls with Resistance  - 1 x daily - 7 x weekly - 3 sets - 10 reps  - Sit to Stand  - 1 x daily - 7 x weekly - 3 sets - 10 reps  - Seated Hamstring Stretch  - 1 x daily - 7 x weekly - 3 sets - 10 reps  - Supine Straight Leg Raises   - 1 x daily - 7 x weekly - 3 sets - 10 reps      Date: 08/05/2024  Prepared by: Celia Simpson    Exercises  - Sitting Knee Extension with Resistance  - 1 x daily - 7 x weekly - 3 sets - 10 reps  - Seated Hamstring Curls with Resistance  - 1 x daily - 7 x weekly - 3 sets - 10 reps  - Sit to Stand  - 1 x daily - 7 x weekly - 3 sets - 10 reps  - Seated Hamstring Stretch  - 1 x daily - 7 x weekly - 3 sets - 10 reps  - Supine Straight Leg Raises  - 1 x daily - 7 x weekly - 3 sets - 10 reps  - Gastroc Stretch on Wall  - 1 x daily - 7 x weekly - 3 sets - 10 reps  - Heel Raises with Counter Support  - 1 x daily - 7 x weekly - 3 sets - 10 reps  - Standing March with Counter Support  - 1 x daily - 7 x weekly - 3 sets - 10 reps  - Standing Knee Flexion AROM with Chair Support  - 1 x daily - 7 x weekly - 3 sets - 10 reps  - Standing 3-Way Leg Reach with Resistance at Ankles and Counter Support  - 1 x daily - 7 x weekly - 3 sets - 10 reps    Plan: [x] Continue current frequency toward long and short term goals.    [] Specific Instructions for subsequent treatments:       Time In: 11:00am            Time Out: 11:52am    Electronically signed by:  LUIS FALCON PTA

## 2024-09-04 ENCOUNTER — HOSPITAL ENCOUNTER (OUTPATIENT)
Dept: PHYSICAL THERAPY | Facility: CLINIC | Age: 69
Setting detail: THERAPIES SERIES
Discharge: HOME | End: 2024-09-04
Payer: COMMERCIAL

## 2024-09-04 PROCEDURE — 97110 THERAPEUTIC EXERCISES: CPT

## 2024-09-11 ENCOUNTER — HOSPITAL ENCOUNTER (OUTPATIENT)
Dept: PHYSICAL THERAPY | Facility: CLINIC | Age: 69
Setting detail: THERAPIES SERIES
Discharge: HOME | End: 2024-09-11
Payer: COMMERCIAL

## 2024-09-11 PROCEDURE — 97110 THERAPEUTIC EXERCISES: CPT

## 2024-09-18 ENCOUNTER — HOSPITAL ENCOUNTER (OUTPATIENT)
Dept: PHYSICAL THERAPY | Facility: CLINIC | Age: 69
Setting detail: THERAPIES SERIES
Discharge: HOME | End: 2024-09-18
Payer: COMMERCIAL

## 2024-09-18 PROCEDURE — 97110 THERAPEUTIC EXERCISES: CPT

## 2024-09-19 ENCOUNTER — OFFICE VISIT (OUTPATIENT)
Dept: FAMILY MEDICINE CLINIC | Age: 69
End: 2024-09-19
Payer: COMMERCIAL

## 2024-09-19 VITALS
DIASTOLIC BLOOD PRESSURE: 72 MMHG | BODY MASS INDEX: 32.51 KG/M2 | TEMPERATURE: 97.3 F | OXYGEN SATURATION: 98 % | SYSTOLIC BLOOD PRESSURE: 132 MMHG | WEIGHT: 213.8 LBS | HEART RATE: 82 BPM

## 2024-09-19 DIAGNOSIS — E11.9 TYPE 2 DIABETES MELLITUS WITHOUT COMPLICATION, UNSPECIFIED WHETHER LONG TERM INSULIN USE (HCC): Primary | ICD-10-CM

## 2024-09-19 DIAGNOSIS — M17.12 PRIMARY OSTEOARTHRITIS OF LEFT KNEE: ICD-10-CM

## 2024-09-19 DIAGNOSIS — L65.0 TELOGEN EFFLUVIUM: ICD-10-CM

## 2024-09-19 PROCEDURE — 99213 OFFICE O/P EST LOW 20 MIN: CPT

## 2024-09-19 PROCEDURE — 3044F HG A1C LEVEL LT 7.0%: CPT

## 2024-09-19 PROCEDURE — 1123F ACP DISCUSS/DSCN MKR DOCD: CPT

## 2024-09-19 ASSESSMENT — ENCOUNTER SYMPTOMS
WHEEZING: 0
ABDOMINAL PAIN: 0
COUGH: 0
SHORTNESS OF BREATH: 0

## 2024-09-24 ENCOUNTER — TELEPHONE (OUTPATIENT)
Dept: FAMILY MEDICINE CLINIC | Age: 69
End: 2024-09-24

## 2024-10-24 ENCOUNTER — TELEPHONE (OUTPATIENT)
Dept: FAMILY MEDICINE CLINIC | Age: 69
End: 2024-10-24

## 2024-10-24 NOTE — TELEPHONE ENCOUNTER
Medication Management Service ([facility])  [facility]  [phone]    10/24/24 11:11 AM    CLINICAL PHARMACY NOTE:    Patient called PharmD back to discuss scheduling appointment for diabetes PAP. Patient receives the Ozempic and Novolin NPH through [employer] PAP. Re-enrollment period for 2025 opened 10/15/2024. Scheduled patient to talk with me on 10/25/2024 at 8:30 AM for re-enrollment.    Hermelinda Narayanan (Robbie)D  PGY2 Ambulatory Care Pharmacy Resident  [facility] Medication Management Service  [phone]  10/24/24  =========================================  For Pharmacy Admin Tracking Only    Program: Medical Group  CPA in place:  Yes  Recommendation Provided To: Patient/Caregiver: 1 via Telephone  Intervention Detail: Patient Access Assistance/Sample Provided  Intervention Accepted By: Patient/Caregiver: 1  Time Spent (min): 20

## 2024-10-24 NOTE — TELEPHONE ENCOUNTER
Medication Management Service ([facility])  [facility]  [phone]    10/24/24 10:44 AM    CLINICAL PHARMACY NOTE:    Patient called PharmD line and LVM explaining she was expecting to have an appointment with PharmD to fill out paperwork for PAP renewal. PharmD cannot see any such appointment being scheduled or canceled. PharmD attempted to call patient back to schedule follow up appointment, but she did not answer her phone for today. LVM for her to call us back when she can.    Robert Reza PharmD (Robbie)  PGY2 Ambulatory Care Pharmacy Resident  [facility] Medication Management Service  [phone]  10/24/24  ==========================================  For Pharmacy Admin Tracking Only    Program: Medical Group  CPA in place:  Yes  Intervention Detail: Patient Access Assistance/Sample Provided  Time Spent (min): 10

## 2024-10-25 ENCOUNTER — SCHEDULED TELEPHONE ENCOUNTER (OUTPATIENT)
Dept: FAMILY MEDICINE CLINIC | Age: 69
End: 2024-10-25

## 2024-10-25 DIAGNOSIS — E11.9 TYPE 2 DIABETES MELLITUS WITHOUT COMPLICATION, UNSPECIFIED WHETHER LONG TERM INSULIN USE (HCC): Primary | ICD-10-CM

## 2024-10-25 PROCEDURE — APPNB30 APP NON BILLABLE TIME 0-30 MINS

## 2024-10-25 NOTE — PROGRESS NOTES
Medication Management Service ([facility])  [facility]  [phone]    10/25/24 8:24 AM    Pharmacist Consult - Medication Patient Assistance Program    Patient's PCP: Dr. De León    Verbal order from provider to complete PAP for patient and collaborative practice agreement in previously in place with last PCP for DM management    PCP Office:   [facility] Medication Management Service  [address] [place], [place] [id number]  Pharmacist Phone: [phone]  Clinic Phone: [phone]    Verena Elliott is a 69 y.o. female. PharmD investigating eligibility of patient to receive Ozempic and Novolin N through [employer] (P: [phone]).  Directions:  Ozempic: Dial and inject 2 mg under the skin once weekly  Novolin N: Inject 60 units under the skin every morning    Patient Phone: [phone]  Patient Address: [address] [place] [id number]     Application Type: Renewal for 2025    Application completed on behalf of Claritza Fisher and completed online. Received consent from patient to sign application on their behalf..    Patient enrolled in PAP online through manufacturers website    Contacted patient with above updates and instructions. Educated patient to call [employer] (P: [phone]) in 48-72 hours to verify they have received everything they need to move forward with the program. Patient verbalized understanding and had no further questions or concerns on this for me at this time. Application documents scanned into media tab for future reference.    Robert Reza (Robbie), Eva  =====================================================  For Pharmacy Admin Tracking Only    Program: Medical Group  CPA in place:  Yes  Recommendation Provided To: Patient/Caregiver: 1 via Telephone  Intervention Detail: Patient Access Assistance/Sample Provided  Intervention Accepted By: Patient/Caregiver: 1  Time Spent (min): 30

## 2024-10-30 ENCOUNTER — OFFICE VISIT (OUTPATIENT)
Dept: FAMILY MEDICINE CLINIC | Age: 69
End: 2024-10-30

## 2024-10-30 VITALS
BODY MASS INDEX: 34.31 KG/M2 | WEIGHT: 218.6 LBS | SYSTOLIC BLOOD PRESSURE: 132 MMHG | DIASTOLIC BLOOD PRESSURE: 82 MMHG | HEART RATE: 84 BPM | TEMPERATURE: 97.2 F | RESPIRATION RATE: 98 BRPM | HEIGHT: 67 IN

## 2024-10-30 DIAGNOSIS — Z78.0 POST-MENOPAUSAL: ICD-10-CM

## 2024-10-30 DIAGNOSIS — Z87.891 PERSONAL HISTORY OF TOBACCO USE: ICD-10-CM

## 2024-10-30 DIAGNOSIS — I10 BENIGN ESSENTIAL HTN: ICD-10-CM

## 2024-10-30 DIAGNOSIS — E11.9 TYPE 2 DIABETES MELLITUS WITHOUT COMPLICATION, UNSPECIFIED WHETHER LONG TERM INSULIN USE (HCC): Primary | ICD-10-CM

## 2024-10-30 DIAGNOSIS — Z00.00 MEDICARE ANNUAL WELLNESS VISIT, SUBSEQUENT: ICD-10-CM

## 2024-10-30 RX ORDER — LISINOPRIL 5 MG/1
5 TABLET ORAL DAILY
Qty: 90 TABLET | Refills: 1 | Status: CANCELLED | OUTPATIENT
Start: 2024-10-30

## 2024-10-30 ASSESSMENT — PATIENT HEALTH QUESTIONNAIRE - PHQ9
2. FEELING DOWN, DEPRESSED OR HOPELESS: NOT AT ALL
SUM OF ALL RESPONSES TO PHQ9 QUESTIONS 1 & 2: 1
SUM OF ALL RESPONSES TO PHQ QUESTIONS 1-9: 1
1. LITTLE INTEREST OR PLEASURE IN DOING THINGS: SEVERAL DAYS
SUM OF ALL RESPONSES TO PHQ QUESTIONS 1-9: 1

## 2024-10-30 ASSESSMENT — LIFESTYLE VARIABLES
HOW MANY STANDARD DRINKS CONTAINING ALCOHOL DO YOU HAVE ON A TYPICAL DAY: 1 OR 2
HOW OFTEN DO YOU HAVE A DRINK CONTAINING ALCOHOL: MONTHLY OR LESS

## 2024-10-30 NOTE — ASSESSMENT & PLAN NOTE
Chronic, at goal (stable), will decrease the insulin to 50 units as patient has been on the 60 units for a while even after starting Ozempic as per patient report and to continue Ozempic patient follows regularly with Sally goal of A1c less than 8 last A1c 6.6, medication adherence emphasized, and lifestyle modifications recommended

## 2024-10-30 NOTE — PATIENT INSTRUCTIONS
in a small number of people.  How is it done?  Lung cancer screening is done with a low-dose CT (computed tomography) scan. A CT scan uses X-rays, or radiation, to make detailed pictures of your body. Experts recommend that screening be done in medical centers that focus on finding and treating lung cancer.  Who is screening recommended for?  Lung cancer screening is recommended for people age 50 and older who are or were heavy smokers. That means people with a smoking history of at least 20 pack years. A pack year is a way to measure how heavy a smoker you are or were.  To figure out your pack years, multiply how many packs a day on average (assuming 20 cigarettes per pack) you have smoked by how many years you have smoked. For example:  If you smoked 1 pack a day for 20 years, that's 1 times 20. So you have a smoking history of 20 pack years.  If you smoked 2 packs a day for 10 years, that's 2 times 10. So you have a smoking history of 20 pack years.  Experts agree that screening is for people who have a high risk of lung cancer. But experts don't agree on what high risk means. Some say people age 50 or older with at least a 20-pack-year smoking history are high risk. Others say it's people age 55 or older with a 30-pack-year history.  To see if you could benefit from screening, first find out if you are at high risk for lung cancer. Your doctor can help you decide your lung cancer risk.  What are the risks of screening?  CT screening for lung cancer isn't perfect. It can show an abnormal result when it turns out there wasn't any cancer. This is called a false-positive result. This means you may need more tests to make sure you don't have cancer. These tests can be harmful and cause a lot of worry.  These tests may include more CT scans and invasive testing like a lung biopsy. In a biopsy, the doctor takes a sample of tissue from inside your lung so it can be looked at under a microscope. A biopsy is the only way

## 2024-10-30 NOTE — PROGRESS NOTES
N;NOVOLIN N) 100 UNIT/ML injection vial Inject 50 Units into the skin every morning Yes ProviderDulce MD   atorvastatin (LIPITOR) 80 MG tablet Take 1 tablet by mouth daily Yes Donal Cook DO   famotidine (PEPCID) 40 MG tablet Take 1 tablet by mouth every evening Yes Donal Cook DO   metoprolol tartrate (LOPRESSOR) 25 MG tablet Take 1 tablet by mouth daily Yes Donal Cook DO   metFORMIN (GLUCOPHAGE-XR) 500 MG extended release tablet Take 2 tablets by mouth daily (with breakfast) Yes Donal Cook DO   semaglutide, 2 MG/DOSE, (OZEMPIC, 2 MG/DOSE,) 8 MG/3ML SOPN sc injection Inject 0.75 mLs into the skin every 7 days Yes Donal Cook DO   fenofibrate (TRIGLIDE) 160 MG tablet TAKE 1 TABLET BY MOUTH DAILY Yes Donal Cook DO   albuterol sulfate HFA (PROVENTIL HFA) 108 (90 Base) MCG/ACT inhaler Inhale 2 puffs into the lungs every 6 hours as needed for Wheezing Yes Romina Reyes APRN - CNP   mometasone (ELOCON) 0.1 % cream Apply topically daily. Yes Kori Michaud APRN - CNP   aspirin (ASPIRIN LOW DOSE) 81 MG EC tablet TAKE 1 TABLET BY MOUTH ONCE DAILY Yes Donal Cook DO       CareTeam (Including outside providers/suppliers regularly involved in providing care):   Patient Care Team:  Claritza De León MD as PCP - General (Family Medicine)  Claritza De León MD as PCP - [facility]  Tong Sepulveda MD as Consulting Physician  Sally Cruz [facility] as Pharmacist (Pharmacist)  Angely Menendez RPH as Pharmacist (Pharmacy)      Reviewed and updated this visit:  Allergies  Meds

## 2024-11-19 DIAGNOSIS — E11.9 TYPE 2 DIABETES MELLITUS WITHOUT COMPLICATION, UNSPECIFIED WHETHER LONG TERM INSULIN USE (HCC): Primary | ICD-10-CM

## 2024-11-20 ENCOUNTER — HOSPITAL ENCOUNTER (OUTPATIENT)
Dept: MAMMOGRAPHY | Age: 69
Discharge: HOME | End: 2024-11-22
Payer: COMMERCIAL

## 2024-11-20 ENCOUNTER — HOSPITAL ENCOUNTER (OUTPATIENT)
Dept: CT IMAGING | Age: 69
Discharge: HOME | End: 2024-11-22
Payer: COMMERCIAL

## 2024-11-20 VITALS — BODY MASS INDEX: 32.13 KG/M2 | WEIGHT: 212 LBS | HEIGHT: 68 IN

## 2024-11-20 DIAGNOSIS — Z87.891 PERSONAL HISTORY OF TOBACCO USE: ICD-10-CM

## 2024-11-20 DIAGNOSIS — Z78.0 POST-MENOPAUSAL: ICD-10-CM

## 2024-11-20 PROCEDURE — 77080 DXA BONE DENSITY AXIAL: CPT

## 2024-11-20 PROCEDURE — 71271 CT THORAX LUNG CANCER SCR C-: CPT

## 2024-11-26 DIAGNOSIS — M85.88 OSTEOPENIA OF LUMBAR SPINE: Primary | ICD-10-CM

## 2024-11-26 RX ORDER — HYDROCHLOROTHIAZIDE 12.5 MG/1
12.5 CAPSULE ORAL EVERY MORNING
Qty: 60 CAPSULE | Refills: 1 | Status: SHIPPED | OUTPATIENT
Start: 2024-11-26

## 2024-11-26 RX ORDER — CAL/D3/MAG11/ZINC/COP/MANG/BOR 600 MG-800
1 TABLET ORAL DAILY
Qty: 90 TABLET | Refills: 1 | Status: SHIPPED | OUTPATIENT
Start: 2024-11-26 | End: 2024-11-26 | Stop reason: CLARIF

## 2024-11-26 RX ORDER — CHOLECALCIFEROL (VITAMIN D3) 25 MCG
1000 TABLET ORAL DAILY
Qty: 90 TABLET | Refills: 1 | Status: SHIPPED | OUTPATIENT
Start: 2024-11-26

## 2024-11-29 PROBLEM — Z00.00 MEDICARE ANNUAL WELLNESS VISIT, SUBSEQUENT: Status: RESOLVED | Noted: 2024-10-30 | Resolved: 2024-11-29

## 2024-12-02 ENCOUNTER — SCHEDULED TELEPHONE ENCOUNTER (OUTPATIENT)
Dept: FAMILY MEDICINE CLINIC | Age: 69
End: 2024-12-02

## 2024-12-02 DIAGNOSIS — Z79.899 MEDICATION MANAGEMENT: Primary | ICD-10-CM

## 2024-12-02 PROCEDURE — APPNB30 APP NON BILLABLE TIME 0-30 MINS: Performed by: PHARMACIST

## 2024-12-02 PROCEDURE — 99999 PR OFFICE/OUTPT VISIT,PROCEDURE ONLY: CPT | Performed by: PHARMACIST

## 2024-12-02 NOTE — PROGRESS NOTES
syringe)  Metformin XR 500mg 2 tablets daily with breakfast  Ozempic 2mg weekly (Monday)-Continues to have loose bowels. Tolerating well.  No other side effects noted.      Medication Adherence/Cost/Adverse Events:   No missed doses. Tolerating medication well.   Approved for patient assistance through 12/31/2024 (Domenica Nordisk-NPH and Ozempic pens).  Re-Enrollment application completed for 2025 on 10/25/2024.      Blood Glucose Monitoring System: Fingersticks    Previous Home Glucose Readings:   With last visit on 05/20/2024-FBG reported as being 185mg/dL.      Current Home Glucose Readings:   Checks once daily in the morning.  No post-prandial evening.  No lows. No readings in the 70s.  Highest reading noted was 148mg/dL.  Others readings have been at goal.  Between 80-130mg/dL.      Hypoglycemia:   Classification of Hypoglycemia Events  [x]   None Reported  []   Level 1   (Glucose less than 70mg/dL and greater than or equal to 54mg/dL)  []   Level 2   (Glucose less than 54mg/dL)  []   Level 3   (A severe event characterized by altered mental and/or physical status requiring assistance for treatment of hypoglycemia)    Diabetes Goals: Using ADA Standards of Care     Goal A1c:  Less than 7 %   Fasting Blood Sugars:  80-130mg/dL  Postprandial glucose:  Less than 180mg/dL     A1C MONITORING  Lab Results   Component Value Date    LABA1C 6.6 07/23/2024    LABA1C 7.1 03/13/2024    LABA1C 6.8 (H) 08/31/2023     RENAL MONITORING  Lab Results   Component Value Date/Time    MICROALBUR 42 07/12/2024 08:30 AM     CrCl cannot be calculated (Unknown ideal weight.).    CHOLESTEROL MANAGEMENT  Lab Results   Component Value Date    CHOL 112 07/12/2024    TRIG 135 07/12/2024    HDL 37 (L) 07/12/2024     ALT   Date Value Ref Range Status   07/11/2024 27 10 - 35 U/L Final     AST   Date Value Ref Range Status   07/11/2024 27 10 - 35 U/L Final     BLOOD PRESSURE MANAGEMENT  BP Readings from Last 3 Encounters:   10/30/24 132/82

## 2025-01-29 ENCOUNTER — OFFICE VISIT (OUTPATIENT)
Dept: FAMILY MEDICINE CLINIC | Age: 70
End: 2025-01-29
Payer: COMMERCIAL

## 2025-01-29 VITALS
WEIGHT: 210.4 LBS | DIASTOLIC BLOOD PRESSURE: 72 MMHG | SYSTOLIC BLOOD PRESSURE: 130 MMHG | TEMPERATURE: 97.1 F | BODY MASS INDEX: 32.33 KG/M2 | HEART RATE: 94 BPM | OXYGEN SATURATION: 96 %

## 2025-01-29 DIAGNOSIS — E11.9 TYPE 2 DIABETES MELLITUS WITHOUT COMPLICATION, UNSPECIFIED WHETHER LONG TERM INSULIN USE (HCC): ICD-10-CM

## 2025-01-29 DIAGNOSIS — M25.512 ACUTE PAIN OF LEFT SHOULDER: Primary | ICD-10-CM

## 2025-01-29 DIAGNOSIS — M85.88 OSTEOPENIA OF LUMBAR SPINE: ICD-10-CM

## 2025-01-29 DIAGNOSIS — F43.9 STRESS: ICD-10-CM

## 2025-01-29 LAB — HBA1C MFR BLD: 6.9 %

## 2025-01-29 PROCEDURE — 1123F ACP DISCUSS/DSCN MKR DOCD: CPT

## 2025-01-29 PROCEDURE — 99214 OFFICE O/P EST MOD 30 MIN: CPT

## 2025-01-29 PROCEDURE — 3078F DIAST BP <80 MM HG: CPT

## 2025-01-29 PROCEDURE — 1159F MED LIST DOCD IN RCRD: CPT

## 2025-01-29 PROCEDURE — 3044F HG A1C LEVEL LT 7.0%: CPT

## 2025-01-29 PROCEDURE — 83036 HEMOGLOBIN GLYCOSYLATED A1C: CPT

## 2025-01-29 PROCEDURE — 3075F SYST BP GE 130 - 139MM HG: CPT

## 2025-01-29 RX ORDER — FAMOTIDINE 40 MG/1
40 TABLET, FILM COATED ORAL EVERY EVENING
Qty: 90 TABLET | Refills: 3 | Status: SHIPPED | OUTPATIENT
Start: 2025-01-29

## 2025-01-29 RX ORDER — METFORMIN HYDROCHLORIDE 500 MG/1
1000 TABLET, EXTENDED RELEASE ORAL
Qty: 180 TABLET | Refills: 3 | Status: SHIPPED | OUTPATIENT
Start: 2025-01-29

## 2025-01-29 RX ORDER — METOPROLOL TARTRATE 25 MG/1
25 TABLET, FILM COATED ORAL DAILY
Qty: 90 TABLET | Refills: 3 | Status: SHIPPED | OUTPATIENT
Start: 2025-01-29

## 2025-01-29 RX ORDER — MULTIVIT-MIN/IRON/FOLIC ACID/K 18-600-40
2000 CAPSULE ORAL DAILY
Qty: 90 CAPSULE | Refills: 1 | Status: SHIPPED | OUTPATIENT
Start: 2025-01-29

## 2025-01-29 RX ORDER — HYDROCHLOROTHIAZIDE 12.5 MG/1
12.5 CAPSULE ORAL EVERY MORNING
Qty: 90 CAPSULE | Refills: 1 | Status: SHIPPED | OUTPATIENT
Start: 2025-01-29

## 2025-01-29 RX ORDER — ATORVASTATIN CALCIUM 80 MG/1
80 TABLET, FILM COATED ORAL DAILY
Qty: 90 TABLET | Refills: 3 | Status: SHIPPED | OUTPATIENT
Start: 2025-01-29

## 2025-01-29 SDOH — ECONOMIC STABILITY: FOOD INSECURITY: WITHIN THE PAST 12 MONTHS, YOU WORRIED THAT YOUR FOOD WOULD RUN OUT BEFORE YOU GOT MONEY TO BUY MORE.: NEVER TRUE

## 2025-01-29 SDOH — ECONOMIC STABILITY: FOOD INSECURITY: WITHIN THE PAST 12 MONTHS, THE FOOD YOU BOUGHT JUST DIDN'T LAST AND YOU DIDN'T HAVE MONEY TO GET MORE.: NEVER TRUE

## 2025-01-29 ASSESSMENT — PATIENT HEALTH QUESTIONNAIRE - PHQ9
1. LITTLE INTEREST OR PLEASURE IN DOING THINGS: NOT AT ALL
SUM OF ALL RESPONSES TO PHQ QUESTIONS 1-9: 0
2. FEELING DOWN, DEPRESSED OR HOPELESS: NOT AT ALL
SUM OF ALL RESPONSES TO PHQ QUESTIONS 1-9: 0
SUM OF ALL RESPONSES TO PHQ9 QUESTIONS 1 & 2: 0

## 2025-01-29 ASSESSMENT — ENCOUNTER SYMPTOMS
ABDOMINAL PAIN: 0
COUGH: 0
SHORTNESS OF BREATH: 0
WHEEZING: 0

## 2025-01-29 NOTE — ASSESSMENT & PLAN NOTE
Chronic, not at goal (unstable), A recent DEXA scan revealed osteopenia. She is advised to continue taking her current multivitamin for women over 50, which likely contains calcium and vitamin D. She is also taking calcium 250 mg and vitamin D 1000 units. She should verify the dosages of calcium and vitamin D in her multivitamin and report back to ensure she is on the correct dose, medication adherence emphasized, and lifestyle modifications recommended    Orders:    hydroCHLOROthiazide 12.5 MG capsule; Take 1 capsule by mouth every morning    Calcium 250 MG CAPS; Take 1 tablet by mouth daily    vitamin D 50 MCG (2000 UT) CAPS capsule; Take 1 capsule by mouth daily

## 2025-01-29 NOTE — ASSESSMENT & PLAN NOTE
Chronic, at goal (stable), follows with Sally and prefers non CGM, Her A1c level is currently at 6.9, indicating good control of her diabetes. She will continue her current medication regimen, including Ozempic 2 mg weekly and NovoLog 50 units. She is advised to monitor her blood sugar levels and report any significant changes., medication adherence emphasized, and lifestyle modifications recommended    Orders:    POCT glycosylated hemoglobin (Hb A1C)    metFORMIN (GLUCOPHAGE-XR) 500 MG extended release tablet; Take 2 tablets by mouth daily (with breakfast)

## 2025-01-29 NOTE — ASSESSMENT & PLAN NOTE
New, uncertain prognosis, The patient reports shoulder pain with warmth and difficulty moving the arm in certain directions. The pain is suspected to originate from the rotator cuff muscles. An ultrasound of the shoulder will be ordered to investigate potential inflammation or infection. She is advised to apply cold compresses to the affected area. If the ultrasound results are normal, further management will be considered, medication adherence emphasized, and lifestyle modifications recommended    Orders:    US EXTREMITY JOINT LEFT NON VASC COMPLETE; Future

## 2025-01-29 NOTE — PROGRESS NOTES
Verena Elliott (:  1955) is a 69 y.o. female,Established patient, here for evaluation of the following chief complaint(s):  3 Month Follow-Up (Left arm Discomfort) and Diabetes    H&P    This is a 69 years old female with Hx of essential HTN, CAD on Lopressor, Lipitor, fenofibrate, aspirin, GERD on Pepcid, DM type II on Ozempic and NovoLog 116 units in the morning, mild intermittent asthma well-controlled on rescue inhaler, osteoarthritis of multiple joints, hair loss, Kidney stones     History of Present Illness  The patient presents for evaluation of shoulder pain, diabetes mellitus, osteopenia, and stress.    She reports experiencing pain in her arm, which she attributes to a muscle issue rather than the bruise she sustained from a recent fall at work. The pain is described as intense, particularly when moving her arm in certain directions. She also notes a sensation of warmth in the affected area. Despite the fall, she does not believe it is related to her current arm pain, as the discomfort was present prior to the incident. She expresses concern that the muscle injury does not appear to be healing as expected.    She has been on Ozempic 2 mg every Monday for a couple of years without any issues. Her NovoLog dosage was decreased to 50 units. She is awaiting the results of her A1c test.    She takes a multivitamin for women over 50 and calcium 250 mg. She has not had any kidney stones since her hospitalization in 2023.    She is under a lot of stress due to work and family issues. She is a single mother of 2 children and has no relatives to help her. She has been  since . She has 2 very good friends, one lives in [place] and the other one lives in [place]. She did take and go down to [place] for 2 weeks just for herself, went down and stayed with her friend and her , had a great 2 weeks, came back stress free.    She requests refills for metformin, Pepcid, Lopressor,

## 2025-01-29 NOTE — ASSESSMENT & PLAN NOTE
New, uncertain prognosis, She is under a lot of stress due to work and family issues. She is advised to let us know if she needs to talk to a therapist.

## 2025-02-03 ENCOUNTER — TELEPHONE (OUTPATIENT)
Dept: FAMILY MEDICINE CLINIC | Age: 70
End: 2025-02-03

## 2025-02-03 NOTE — TELEPHONE ENCOUNTER
Called and spoke to [facility]. They called [facility]'s Ultrasound department and was informed that that is the correct order for that study and the patient will be called and scheduled

## 2025-02-03 NOTE — TELEPHONE ENCOUNTER
Mary from [facility] Central Scheduling called and stated the order for us extremity joint left non vascular complete is only used for extremities with a lump/mass. The correct order for pain is U/S MSK and she is asking if the order can be changed to that.

## 2025-02-03 NOTE — TELEPHONE ENCOUNTER
I can not find such an order in epic, please confirm the code for that to be able to place the order

## 2025-02-06 ENCOUNTER — SCHEDULED TELEPHONE ENCOUNTER (OUTPATIENT)
Dept: FAMILY MEDICINE CLINIC | Age: 70
End: 2025-02-06

## 2025-02-06 DIAGNOSIS — Z79.899 MEDICATION MANAGEMENT: Primary | ICD-10-CM

## 2025-02-06 PROCEDURE — 99999 PR OFFICE/OUTPT VISIT,PROCEDURE ONLY: CPT | Performed by: PHARMACIST

## 2025-02-06 PROCEDURE — APPNB30 APP NON BILLABLE TIME 0-30 MINS: Performed by: PHARMACIST

## 2025-02-06 NOTE — PROGRESS NOTES
AM      ==================================================================    For Pharmacy Admin Tracking Only    Program: Medical Group  CPA in place:  Yes  Time Spent (min): 30

## 2025-03-11 ENCOUNTER — HOSPITAL ENCOUNTER (OUTPATIENT)
Dept: ULTRASOUND IMAGING | Age: 70
Discharge: HOME | End: 2025-03-13
Payer: COMMERCIAL

## 2025-03-11 DIAGNOSIS — M25.512 ACUTE PAIN OF LEFT SHOULDER: ICD-10-CM

## 2025-03-11 PROCEDURE — 76881 US COMPL JOINT R-T W/IMG: CPT

## 2025-03-18 ENCOUNTER — RESULTS FOLLOW-UP (OUTPATIENT)
Dept: ULTRASOUND IMAGING | Age: 70
End: 2025-03-18

## 2025-03-18 DIAGNOSIS — M25.512 ACUTE PAIN OF LEFT SHOULDER: Primary | ICD-10-CM

## 2025-04-02 ENCOUNTER — OFFICE VISIT (OUTPATIENT)
Dept: FAMILY MEDICINE CLINIC | Age: 70
End: 2025-04-02
Payer: COMMERCIAL

## 2025-04-02 VITALS
HEIGHT: 66 IN | DIASTOLIC BLOOD PRESSURE: 60 MMHG | SYSTOLIC BLOOD PRESSURE: 122 MMHG | OXYGEN SATURATION: 94 % | WEIGHT: 211.2 LBS | TEMPERATURE: 97.3 F | RESPIRATION RATE: 18 BRPM | HEART RATE: 88 BPM | BODY MASS INDEX: 33.94 KG/M2

## 2025-04-02 DIAGNOSIS — Z00.00 MEDICARE ANNUAL WELLNESS VISIT, SUBSEQUENT: Primary | ICD-10-CM

## 2025-04-02 DIAGNOSIS — Z12.11 SCREENING FOR COLON CANCER: ICD-10-CM

## 2025-04-02 PROCEDURE — 3078F DIAST BP <80 MM HG: CPT

## 2025-04-02 PROCEDURE — 3074F SYST BP LT 130 MM HG: CPT

## 2025-04-02 PROCEDURE — 1123F ACP DISCUSS/DSCN MKR DOCD: CPT

## 2025-04-02 PROCEDURE — G0439 PPPS, SUBSEQ VISIT: HCPCS

## 2025-04-02 PROCEDURE — 1159F MED LIST DOCD IN RCRD: CPT

## 2025-04-02 ASSESSMENT — PATIENT HEALTH QUESTIONNAIRE - PHQ9
SUM OF ALL RESPONSES TO PHQ QUESTIONS 1-9: 0
1. LITTLE INTEREST OR PLEASURE IN DOING THINGS: NOT AT ALL
3. TROUBLE FALLING OR STAYING ASLEEP: NOT AT ALL
9. THOUGHTS THAT YOU WOULD BE BETTER OFF DEAD, OR OF HURTING YOURSELF: NOT AT ALL
8. MOVING OR SPEAKING SO SLOWLY THAT OTHER PEOPLE COULD HAVE NOTICED. OR THE OPPOSITE, BEING SO FIGETY OR RESTLESS THAT YOU HAVE BEEN MOVING AROUND A LOT MORE THAN USUAL: NOT AT ALL
7. TROUBLE CONCENTRATING ON THINGS, SUCH AS READING THE NEWSPAPER OR WATCHING TELEVISION: NOT AT ALL
10. IF YOU CHECKED OFF ANY PROBLEMS, HOW DIFFICULT HAVE THESE PROBLEMS MADE IT FOR YOU TO DO YOUR WORK, TAKE CARE OF THINGS AT HOME, OR GET ALONG WITH OTHER PEOPLE: NOT DIFFICULT AT ALL
SUM OF ALL RESPONSES TO PHQ QUESTIONS 1-9: 0
6. FEELING BAD ABOUT YOURSELF - OR THAT YOU ARE A FAILURE OR HAVE LET YOURSELF OR YOUR FAMILY DOWN: NOT AT ALL
4. FEELING TIRED OR HAVING LITTLE ENERGY: NOT AT ALL
5. POOR APPETITE OR OVEREATING: NOT AT ALL
2. FEELING DOWN, DEPRESSED OR HOPELESS: NOT AT ALL
SUM OF ALL RESPONSES TO PHQ QUESTIONS 1-9: 0
SUM OF ALL RESPONSES TO PHQ QUESTIONS 1-9: 0

## 2025-04-02 NOTE — PATIENT INSTRUCTIONS
cholesterol. If you think you may have a problem with alcohol or drug use, talk to your doctor.   Medicines    Take your medicines exactly as prescribed. Call your doctor if you think you are having a problem with your medicine.     If your doctor recommends aspirin, take the amount directed each day. Make sure you take aspirin and not another kind of pain reliever, such as acetaminophen (Tylenol).   When should you call for help?   Call 911 if you have symptoms of a heart attack. These may include:    Chest pain or pressure, or a strange feeling in the chest.     Sweating.     Shortness of breath.     Pain, pressure, or a strange feeling in the back, neck, jaw, or upper belly or in one or both shoulders or arms.     Lightheadedness or sudden weakness.     A fast or irregular heartbeat.   After you call 911, the  may tell you to chew 1 adult-strength or 2 to 4 low-dose aspirin. Wait for an ambulance. Do not try to drive yourself.  Watch closely for changes in your health, and be sure to contact your doctor if you have any problems.  Where can you learn more?  Go to https://www.[employer].net/patientEd and enter F075 to learn more about \"A Healthy Heart: Care Instructions.\"  Current as of: July 31, 2024  Content Version: 14.4  © 0721-5545 [employer].   Care instructions adapted under license by [employer]. If you have questions about a medical condition or this instruction, always ask your healthcare professional. [employer] disclaims any warranty or liability for your use of this information.    Personalized Preventive Plan for Verena Elliott - 4/2/2025  Medicare offers a range of preventive health benefits. Some of the tests and screenings are paid in full while other may be subject to a deductible, co-insurance, and/or copay.  Some of these benefits include a comprehensive review of your medical history including lifestyle, illnesses that may run in your family, and various

## 2025-04-02 NOTE — PROGRESS NOTES
Medicare Annual Wellness Visit    Verena Elliott is here for Medicare AWV, Health Maintenance (Due for retinal exam, colorectal cancer screen, and hep c screen), and Immunizations (Due for pneumococcal, dtap, shingles, and rsv vaccines)    Assessment & Plan     Encounter Diagnoses   Name Primary?    Medicare annual wellness visit, subsequent Yes    Screening for colon cancer          Return in 1 year (on 4/2/2026) for Medicare AWV.     Subjective       Patient's complete Health Risk Assessment and screening values have been reviewed and are found in Flowsheets. The following problems were reviewed today and where indicated follow up appointments were made and/or referrals ordered.    Positive Risk Factor Screenings with Interventions:    Fall Risk:  Do you feel unsteady or are you worried about falling? : (!) yes (not unsteady, worried and takes time when needed)  2 or more falls in past year?: no  Fall with injury in past year?: no     Interventions:    Patient comments: worries about falling, but takes time to prevent falls  Reviewed medications, home hazards, visual acuity, and co-morbidities that can increase risk for falls               Abnormal BMI (obese):  Body mass index is 34.09 kg/m². (!) Abnormal  Interventions:  Patient declines any further evaluation or treatment             Advanced Directives:  Do you have a Living Will?: (!) No (referral placed)    Intervention:  has NO advanced directive  - referred to ACP Coordinator                  Medicare Annual Wellness Visit Express Report     Pended Orders    ID Status Description Pended By When Reason   [id number] Pended AMB REFERRAL TO ACP CLINICAL SPECIALIST Kamila Salamanca LPN 04/02/25 1406    [id number] Pended COLONOSCOPY W/ OR W/O BIOPSY Kamila Salamanca LPN 04/02/25 1406      Sexual Activity    Sexually active; Partners: Male.     Sexual Activity Last Reviewed    Sexual Activity    Reviewed By Date/Time   Kamila Salamanca LPN 4/2/2025  1:49 PM     Fall

## 2025-04-03 ENCOUNTER — CLINICAL DOCUMENTATION (OUTPATIENT)
Dept: SPIRITUAL SERVICES | Age: 70
End: 2025-04-03

## 2025-04-03 NOTE — ACP (ADVANCE CARE PLANNING)
Advance Care Planning   Ambulatory ACP Specialist Patient Outreach    Date:  4/3/2025    ACP Specialist:  Laura Gomez    Outreach call to patient in follow-up to ACP Specialist referral from:Claritza De León MD    [x] PCP  [] Provider   [] Ambulatory Care Management [] Other     For:                  [x] Advance Directive Assistance              [] Complete Portable DNR order              [] Complete POST/POLST/MOST              [] Code Status Discussion             [] Discuss Goals of Care             [] Early ACP Decision-Making              [] Other (Specify)    Date Referral Received: 4/2/2025    Next Step:   [x] ACP scheduled conversation  [] Outreach again in one week               [x] Email / Mail ACP Info Sheets  [x] Email / Mail Advance Directive   [] Closing referral.  Routing closure to referring provider/staff and to ACP Specialist .    [] Closure letter mailed to patient with invitation to contact ACP Specialist if / when ready.   [] Other (Specify here):       [x] At this time, Healthcare Decision Maker Is:         Primary Decision Maker: EarlRodrigoCelia - Child - [phone]    Primary Decision Maker: Patrice Elliott - Child - [phone]      [] Primary agent named in scanned advance directive.    [x] Legal Next of Kin.     [] Unable to determine legal decision maker at this time.    Outreaches:       [x] 1st -  Date:  4/3/2025               Intervention:  [] Spoke with Patient   [x] Left Voice mail [x] Email / Mail    [] Sina Weibohart  [] Other (Specify) :     Outcomes:  Writer attempted ACP outreach to the one number listed for both - patient's home and mobile ([phone]) - no answer.  Writer left voicemail requesting return call including call back number.  ACP outreach sent to patient's email address on file with a copy of [place] [employer] forms and ACP information sheets \"What is Advance Care Planning\" and \"How to Choose a Health Care Agent\" (ACP Coordinator contact information included).

## 2025-04-10 ENCOUNTER — SCHEDULED TELEPHONE ENCOUNTER (OUTPATIENT)
Dept: FAMILY MEDICINE CLINIC | Age: 70
End: 2025-04-10

## 2025-04-10 DIAGNOSIS — Z79.899 MEDICATION MANAGEMENT: Primary | ICD-10-CM

## 2025-04-10 PROCEDURE — APPNB30 APP NON BILLABLE TIME 0-30 MINS: Performed by: PHARMACIST

## 2025-04-10 PROCEDURE — 90000 NO LOS: CPT | Performed by: PHARMACIST

## 2025-04-10 NOTE — PROGRESS NOTES
Medication Management Service  [facility]  [phone]    Verena Elliott is a 70 y.o. female that presents for a follow-up diabetes mellitus telephone visit with Medication Management Service per referral from Dr. Cook, Donal Lamb,  for Diabetes Management under Collaborative Practice Agreement with A1c goal < 7.0 %. Patient PMH includes ASCVD, Asthma, T2DM, Chronic urticaria, osteoarthritis, HLD, microalbuminuria. Complications from DM Include ASCVD and microalbuminuria .     Subjective/Objective     New Problems/Changes since last visit:   Patient appointment today for review of blood glucose & adherence with DM medications.      Continues with Ozempic 2mg weekly (Mondays),  NPH 60 units daily and Metformin XR-2 tablets daily.      Continues with part-time work schedule. Continues with payroll and accounts payable.  Of late has been working Monday & Tuesday. Enjoys being retired and working around 15-20 hours per weekly.     LIFESTYLE  Diet:  Son has started a new diet.  More protein.  Patient will have steak for dinner every evening with no other sides.  Reports that she does try to eat balance meals for breakfast and lunch.  Today she had 2 dippy eggs with half of a doughnut.  Lunch may be tuna fish or stew.  Less fried foods.  Patient reports losing some weight.  Patient reports maybe losing some weight too.  Liver and onions, mashed potatoes and marcella slaw for birthday.  Had a [place] chocolate cake from [employer].  Patient loves to eat liver and onions.  Does not have it often but when she does she enjoys it.  Patient was not surprised the blood sugar was elevated this morning at 155 mg/dL    Physical Activity: No formal exercise routine. Last summer patient's son and his friend fixed the pool's deck.  Drained pool last year.  Planning to re-open this year as the pool should be ready to go.  Previously shared that she enjoys swimming during the summer months.  Has plans to open up the

## 2025-04-11 ENCOUNTER — CLINICAL DOCUMENTATION (OUTPATIENT)
Dept: SPIRITUAL SERVICES | Age: 70
End: 2025-04-11

## 2025-04-11 NOTE — ACP (ADVANCE CARE PLANNING)
Advance Care Planning    Advance Care Planning Clinical Specialist  Conversation Note    Date of ACP Conversation: 4/11/2025    ACP Clinical Specialist: JOHNATHON York    Referral Date:04/02/2025    Received by: PCP    Conversation Conducted with: Patient with decision making capacity      Current Designated Decision Maker/s:    Primary Decision Maker: Celia Elliott - Child - [phone]    Primary Decision Maker: Patrice Elliott - Child - [phone]      Care Preferences Communicated    Code Status:  If the patient's heart were to stop beating, in their present state of health, the patient's CPR Preference: Yes, attempt CPR and Pt remains FULL CODE.    If their health worsens and it becomes clear that the chance of recovery is unlikely, the patient's CPR Preference: Pt defers this decision to healthcare agent (daughter and son).      Ventilator:  If the patient, in their present state of health, suddenly became very ill and unable to breathe on their own, the patient desires the use of a ventilator (intubation).    If their health worsens and it becomes clear that the chance of recovery is unlikely,  Pt defers to healthcare agent (daughter and son--document will be brought to PCP officer per pt).    [x] Yes  [] No   Educated Patient / Decision Maker regarding differences between advance directives and portable DNR orders.    Conversation Summary:   Met with pt briefly to offer ACP support. Pt shared she has completed medical POA document and confirmed primary is Celia cruz, along with secondary is son, Patrice. Pt will provide PCP office with copy for her medical record during next f/u visit (May 2025). Pt does not wish to complete a living will and defers future medical decisions to healthcare agents. Pt had no questions or concerns. Referral to be closed.     Outcomes:  ACP discussion completed      Additional Outcomes:  Outcomes: -Asked patient/family to submit existing ACP document for our records.

## 2025-04-14 ENCOUNTER — TELEPHONE (OUTPATIENT)
Dept: FAMILY MEDICINE CLINIC | Age: 70
End: 2025-04-14

## 2025-04-14 NOTE — TELEPHONE ENCOUNTER
I called and left detailed message that she will need a colonoscopy vs cologuard and the testing was ordered so that dept will reach out to her to schedule. Stated if she has any other questions she can call the office

## 2025-04-18 ENCOUNTER — OFFICE VISIT (OUTPATIENT)
Dept: FAMILY MEDICINE CLINIC | Age: 70
End: 2025-04-18
Payer: COMMERCIAL

## 2025-04-18 VITALS
WEIGHT: 218 LBS | HEART RATE: 90 BPM | TEMPERATURE: 97.3 F | DIASTOLIC BLOOD PRESSURE: 68 MMHG | SYSTOLIC BLOOD PRESSURE: 124 MMHG | HEIGHT: 66 IN | OXYGEN SATURATION: 95 % | BODY MASS INDEX: 35.03 KG/M2

## 2025-04-18 DIAGNOSIS — K13.0 CRACKED LIPS: Primary | ICD-10-CM

## 2025-04-18 DIAGNOSIS — Z12.11 SCREENING FOR COLON CANCER: ICD-10-CM

## 2025-04-18 PROCEDURE — 3074F SYST BP LT 130 MM HG: CPT | Performed by: NURSE PRACTITIONER

## 2025-04-18 PROCEDURE — 1123F ACP DISCUSS/DSCN MKR DOCD: CPT | Performed by: NURSE PRACTITIONER

## 2025-04-18 PROCEDURE — 1160F RVW MEDS BY RX/DR IN RCRD: CPT | Performed by: NURSE PRACTITIONER

## 2025-04-18 PROCEDURE — 3078F DIAST BP <80 MM HG: CPT | Performed by: NURSE PRACTITIONER

## 2025-04-18 PROCEDURE — 1159F MED LIST DOCD IN RCRD: CPT | Performed by: NURSE PRACTITIONER

## 2025-04-18 PROCEDURE — 99213 OFFICE O/P EST LOW 20 MIN: CPT | Performed by: NURSE PRACTITIONER

## 2025-04-18 ASSESSMENT — ENCOUNTER SYMPTOMS
ABDOMINAL PAIN: 0
RESPIRATORY NEGATIVE: 1
EYE PAIN: 0
SHORTNESS OF BREATH: 0
GASTROINTESTINAL NEGATIVE: 1
CHEST TIGHTNESS: 0
ALLERGIC/IMMUNOLOGIC NEGATIVE: 1
EYES NEGATIVE: 1

## 2025-04-18 NOTE — PROGRESS NOTES
Father     High Blood Pressure Father     Heart Disease Father     Diabetes Father     High Cholesterol Sister     High Blood Pressure Sister         PHYSICAL EXAM:     /68   Pulse 90   Temp 97.3 °F (36.3 °C) (Temporal)   Ht 1.676 m (5' 6\")   Wt 98.9 kg (218 lb)   SpO2 95%   BMI 35.19 kg/m²    Physical Exam  Vitals reviewed.   Constitutional:       Appearance: Normal appearance.   HENT:      Head: Normocephalic.      Nose: Nose normal.   Eyes:      Pupils: Pupils are equal, round, and reactive to light.   Cardiovascular:      Rate and Rhythm: Normal rate and regular rhythm.   Pulmonary:      Effort: Pulmonary effort is normal.      Breath sounds: Normal breath sounds.   Abdominal:      General: Bowel sounds are normal.   Musculoskeletal:         General: Normal range of motion.      Cervical back: Normal range of motion and neck supple.   Skin:     General: Skin is warm and dry.   Neurological:      General: No focal deficit present.      Mental Status: She is alert. Mental status is at baseline.   Psychiatric:         Mood and Affect: Mood normal.         Behavior: Behavior normal.         Thought Content: Thought content normal.         ASSESSMENT/PLAN:     1. Cracked lips  2. Screening for colon cancer  -     Anatoly Allan MD, Gastroenterology, [facility]     Recommended trying medicated Blistex OTC multiple times a day, keeping area moisturized.  Increase fluid intake to help with dryness.  Referral sent to GI per patient request for colonoscopy    Return in about 26 days (around 5/14/2025), or as scheduled or if symptoms worsen or fail to improve.    Electronically signed by MAG Lopez on 4/18/2025 at 8:34 AM

## 2025-04-30 ENCOUNTER — PREP FOR PROCEDURE (OUTPATIENT)
Dept: GASTROENTEROLOGY | Age: 70
End: 2025-04-30

## 2025-04-30 ENCOUNTER — TELEPHONE (OUTPATIENT)
Dept: GASTROENTEROLOGY | Age: 70
End: 2025-04-30

## 2025-04-30 DIAGNOSIS — Z12.11 COLON CANCER SCREENING: ICD-10-CM

## 2025-04-30 RX ORDER — BISACODYL 5 MG
TABLET, DELAYED RELEASE (ENTERIC COATED) ORAL
Qty: 4 TABLET | Refills: 0 | Status: SHIPPED | OUTPATIENT
Start: 2025-04-30

## 2025-04-30 RX ORDER — SODIUM, POTASSIUM,MAG SULFATES 17.5-3.13G
1 SOLUTION, RECONSTITUTED, ORAL ORAL ONCE
Qty: 1 EACH | Refills: 0 | Status: SHIPPED | OUTPATIENT
Start: 2025-04-30 | End: 2025-04-30

## 2025-05-01 ENCOUNTER — HOSPITAL ENCOUNTER (OUTPATIENT)
Age: 70
Setting detail: SPECIMEN
Discharge: HOME | End: 2025-05-01

## 2025-05-01 DIAGNOSIS — M85.88 OSTEOPENIA OF LUMBAR SPINE: ICD-10-CM

## 2025-05-01 LAB — CALCIUM UR-MCNC: 16.3 MG/DL

## 2025-05-07 ENCOUNTER — HOSPITAL ENCOUNTER (OUTPATIENT)
Dept: MRI IMAGING | Age: 70
Discharge: HOME | End: 2025-05-09
Payer: COMMERCIAL

## 2025-05-07 DIAGNOSIS — M25.512 ACUTE PAIN OF LEFT SHOULDER: ICD-10-CM

## 2025-05-07 LAB
CREAT SERPL-MCNC: 0.8 MG/DL (ref 0.5–0.9)
GFR, ESTIMATED: 79 ML/MIN/1.73M2

## 2025-05-07 PROCEDURE — 73223 MRI JOINT UPR EXTR W/O&W/DYE: CPT

## 2025-05-07 PROCEDURE — A9579 GAD-BASE MR CONTRAST NOS,1ML: HCPCS

## 2025-05-07 PROCEDURE — 6360000004 HC RX CONTRAST MEDICATION

## 2025-05-07 PROCEDURE — 82565 ASSAY OF CREATININE: CPT

## 2025-05-07 PROCEDURE — 36415 COLL VENOUS BLD VENIPUNCTURE: CPT

## 2025-05-07 RX ADMIN — GADOTERIDOL 20 ML: 279.3 INJECTION, SOLUTION INTRAVENOUS at 14:28

## 2025-05-08 ENCOUNTER — HOSPITAL ENCOUNTER (OUTPATIENT)
Age: 70
Setting detail: OUTPATIENT SURGERY
Discharge: HOME | End: 2025-05-08
Attending: INTERNAL MEDICINE | Admitting: INTERNAL MEDICINE
Payer: COMMERCIAL

## 2025-05-08 ENCOUNTER — TELEPHONE (OUTPATIENT)
Dept: GASTROENTEROLOGY | Age: 70
End: 2025-05-08

## 2025-05-08 ENCOUNTER — ANESTHESIA (OUTPATIENT)
Dept: OPERATING ROOM | Age: 70
End: 2025-05-08
Payer: COMMERCIAL

## 2025-05-08 ENCOUNTER — ANESTHESIA EVENT (OUTPATIENT)
Dept: OPERATING ROOM | Age: 70
End: 2025-05-08
Payer: COMMERCIAL

## 2025-05-08 VITALS
RESPIRATION RATE: 17 BRPM | SYSTOLIC BLOOD PRESSURE: 135 MMHG | HEIGHT: 67 IN | WEIGHT: 214 LBS | DIASTOLIC BLOOD PRESSURE: 69 MMHG | OXYGEN SATURATION: 97 % | BODY MASS INDEX: 33.59 KG/M2 | TEMPERATURE: 97.3 F | HEART RATE: 75 BPM

## 2025-05-08 LAB
GLUCOSE BLD-MCNC: 134 MG/DL (ref 65–105)
GLUCOSE BLD-MCNC: 152 MG/DL (ref 65–105)

## 2025-05-08 PROCEDURE — 6360000002 HC RX W HCPCS: Performed by: NURSE ANESTHETIST, CERTIFIED REGISTERED

## 2025-05-08 PROCEDURE — 2709999900 HC NON-CHARGEABLE SUPPLY: Performed by: INTERNAL MEDICINE

## 2025-05-08 PROCEDURE — 3609027000 HC COLONOSCOPY: Performed by: INTERNAL MEDICINE

## 2025-05-08 PROCEDURE — 7100000010 HC PHASE II RECOVERY - FIRST 15 MIN: Performed by: INTERNAL MEDICINE

## 2025-05-08 PROCEDURE — 3700000001 HC ADD 15 MINUTES (ANESTHESIA): Performed by: INTERNAL MEDICINE

## 2025-05-08 PROCEDURE — 7100000011 HC PHASE II RECOVERY - ADDTL 15 MIN: Performed by: INTERNAL MEDICINE

## 2025-05-08 PROCEDURE — 82947 ASSAY GLUCOSE BLOOD QUANT: CPT

## 2025-05-08 PROCEDURE — G0105 COLORECTAL SCRN; HI RISK IND: HCPCS | Performed by: INTERNAL MEDICINE

## 2025-05-08 PROCEDURE — 3700000000 HC ANESTHESIA ATTENDED CARE: Performed by: INTERNAL MEDICINE

## 2025-05-08 PROCEDURE — 2580000003 HC RX 258: Performed by: ANESTHESIOLOGY

## 2025-05-08 RX ORDER — SODIUM CHLORIDE 9 MG/ML
INJECTION, SOLUTION INTRAVENOUS PRN
Status: DISCONTINUED | OUTPATIENT
Start: 2025-05-08 | End: 2025-05-08 | Stop reason: HOSPADM

## 2025-05-08 RX ORDER — SODIUM CHLORIDE 0.9 % (FLUSH) 0.9 %
5-40 SYRINGE (ML) INJECTION EVERY 12 HOURS SCHEDULED
Status: DISCONTINUED | OUTPATIENT
Start: 2025-05-08 | End: 2025-05-08 | Stop reason: HOSPADM

## 2025-05-08 RX ORDER — LIDOCAINE HYDROCHLORIDE 20 MG/ML
INJECTION, SOLUTION EPIDURAL; INFILTRATION; INTRACAUDAL; PERINEURAL
Status: DISCONTINUED | OUTPATIENT
Start: 2025-05-08 | End: 2025-05-08 | Stop reason: SDUPTHER

## 2025-05-08 RX ORDER — SODIUM CHLORIDE, SODIUM LACTATE, POTASSIUM CHLORIDE, CALCIUM CHLORIDE 600; 310; 30; 20 MG/100ML; MG/100ML; MG/100ML; MG/100ML
INJECTION, SOLUTION INTRAVENOUS CONTINUOUS
Status: DISCONTINUED | OUTPATIENT
Start: 2025-05-08 | End: 2025-05-08 | Stop reason: HOSPADM

## 2025-05-08 RX ORDER — LIDOCAINE HYDROCHLORIDE 10 MG/ML
1 INJECTION, SOLUTION EPIDURAL; INFILTRATION; INTRACAUDAL; PERINEURAL
Status: DISCONTINUED | OUTPATIENT
Start: 2025-05-08 | End: 2025-05-08 | Stop reason: HOSPADM

## 2025-05-08 RX ORDER — PROPOFOL 10 MG/ML
INJECTION, EMULSION INTRAVENOUS
Status: DISCONTINUED | OUTPATIENT
Start: 2025-05-08 | End: 2025-05-08 | Stop reason: SDUPTHER

## 2025-05-08 RX ORDER — SODIUM CHLORIDE 0.9 % (FLUSH) 0.9 %
5-40 SYRINGE (ML) INJECTION PRN
Status: DISCONTINUED | OUTPATIENT
Start: 2025-05-08 | End: 2025-05-08 | Stop reason: HOSPADM

## 2025-05-08 RX ORDER — SODIUM CHLORIDE 9 MG/ML
INJECTION, SOLUTION INTRAVENOUS CONTINUOUS
Status: DISCONTINUED | OUTPATIENT
Start: 2025-05-08 | End: 2025-05-08 | Stop reason: HOSPADM

## 2025-05-08 RX ORDER — M-VIT,TX,IRON,MINS/CALC/FOLIC 27MG-0.4MG
1 TABLET ORAL DAILY
COMMUNITY

## 2025-05-08 RX ADMIN — PROPOFOL 20 MG: 10 INJECTION, EMULSION INTRAVENOUS at 09:14

## 2025-05-08 RX ADMIN — PROPOFOL 30 MG: 10 INJECTION, EMULSION INTRAVENOUS at 09:10

## 2025-05-08 RX ADMIN — SODIUM CHLORIDE, SODIUM LACTATE, POTASSIUM CHLORIDE, AND CALCIUM CHLORIDE: .6; .31; .03; .02 INJECTION, SOLUTION INTRAVENOUS at 07:24

## 2025-05-08 RX ADMIN — LIDOCAINE HYDROCHLORIDE 40 MG: 20 INJECTION, SOLUTION EPIDURAL; INFILTRATION; INTRACAUDAL; PERINEURAL at 09:16

## 2025-05-08 RX ADMIN — LIDOCAINE HYDROCHLORIDE 60 MG: 20 INJECTION, SOLUTION EPIDURAL; INFILTRATION; INTRACAUDAL; PERINEURAL at 09:08

## 2025-05-08 RX ADMIN — PROPOFOL 100 MCG/KG/MIN: 10 INJECTION, EMULSION INTRAVENOUS at 09:11

## 2025-05-08 ASSESSMENT — PAIN - FUNCTIONAL ASSESSMENT: PAIN_FUNCTIONAL_ASSESSMENT: NONE - DENIES PAIN

## 2025-05-08 ASSESSMENT — PAIN SCALES - GENERAL: PAINLEVEL_OUTOF10: 0

## 2025-05-08 NOTE — TELEPHONE ENCOUNTER
----- Message from Dr. Anatoly Anderson MD sent at 5/8/2025  9:31 AM EDT -----  Repeat colonoscopy in 5 years    5 yr colon recall encountered

## 2025-05-08 NOTE — H&P
Interval H&P Note    Pt Name: Verena Elliott  MRN: [id number]  YOB: 1955  Date of evaluation: 5/8/2025      [x] I have reviewed in [facility] the family medicine progress note by MAG Lopez dated 4/18/2025 for an Interval History and Physical note (attached below).      [x] I have examined  Verena Elliott, a 70 y.o. female.There are no changes to the patient who is scheduled for COLORECTAL CANCER SCREENING, NOT HIGH RISK by Anatoly Anderson MD for Colon cancer screening. Patient has bowel changes. She reports alternating constipation and diarrhea, occasional abdominal pain, and occasional blood in her stool. Denies nausea, vomiting, reflux, or unintentional weight loss. Patient followed bowel prep until watery clear. Previous colonoscopy 2017 (record reviewed, internal hemorrhoids, otherwise normal). No FH colon cancer or polyps. The patient denies new health changes, fever, chills, wheezing, cough, increased SOB, chest pain, open sores or wounds. Known diabetes, POC .  Last Ozempic 4/28/2025. Last aspirin 5/6/2025.    Significant PMH includes arthritis, CAD s/p stent placement (approx. 2011/2012), Type 2 DM (on insulin and Ozempic, last A1C 6.6% on 7/23/2024), dyslipidemia, and HTN.    Vital signs: BP (!) 149/76   Pulse 78   Temp (!) 96.6 °F (35.9 °C)   Resp 18   Ht 1.702 m (5' 7\")   Wt 97.1 kg (214 lb)   SpO2 96%   BMI 33.52 kg/m²     Allergies:  Sulfa antibiotics    Medications:    Prior to Admission medications    Medication Sig Start Date End Date Taking? Authorizing Provider   Multiple Vitamins-Minerals (THERAPEUTIC MULTIVITAMIN-MINERALS) tablet Take 1 tablet by mouth daily   Yes Provider, MD Dulce   bisacodyl 5 MG EC tablet Take as directed for bowel prep/colonoscopy 4/30/25  Yes Anatoly Anderson MD   albuterol sulfate HFA (PROVENTIL HFA) 108 (90 Base) MCG/ACT inhaler Inhale 2 puffs into the lungs every 6 hours as needed for Wheezing 8/21/23  Yes Romina Reyes,

## 2025-05-08 NOTE — DISCHARGE INSTRUCTIONS
[facility]    POST-ENDOSCOPY INSTRUCTIONS    1. ACTIVITY   No driving, operating machinery, or making important decisions for 24 hours.    Resume normal activity after 24 hours.  You may return to work after 24 hours.    2. DIET        Resume your usual diet unless specified below.   ***    3. MEDICATIONS    Resume your usual medications.     Do not consume alcohol, tranquilizers, or sleeping medications for 24 hour unless advised by your physician.                 4. PHYSICIAN FOLLOW-UP / INSTRUCTIONS    Please call the office/clinic in 10 days for biopsy results:      [  ] GI office:  [phone] option #2          Follow up with your family physician as planned.    6. NORMAL CHANGES YOU MAY EXPERIENCE AFTER ENDOSCOPY:       COLONOSCOPY    Passing of gas for several hours.    Some mild abdominal cramping.                You may feel fatigued for the next 24-48   hours due to the prep and sedation    7. CALL YOUR PHYSICIAN IF YOU EXPERIENCE ANY OF THE FOLLOWING      A.  Passing blood rectally or vomiting blood (color may be red or black)      B.  Severe abdominal pain or tenderness (that is not relieved by passing air)      C.  Fever, chills, or excessive sweating      D.  Persistent nausea or vomiting      E.  Redness or swelling at the IV site    If you have additional questions, PLEASE call your doctor or the [facility] GI Unit ([phone] option #2)

## 2025-05-08 NOTE — OP NOTE
Operative Note      Patient: Verena Elliott  YOB: 1955  MRN: [id number]    Date of Procedure: 5/8/2025    Pre-Op Diagnosis Codes:      * Colon cancer screening [Z12.11]         History of polyps    Post-Op Diagnosis: Sigmoid diverticulosis internal hemorrhoids       Procedure(s):  COLORECTAL CANCER SCREENING, NOT HIGH RISK    Surgeon(s):  Anatoly Anderson MD    Assistant:   * No surgical staff found *    Anesthesia: Monitor Anesthesia Care    Estimated Blood Loss (mL): Minimal    Complications: None    Specimens:   * No specimens in log *    Implants:  * No implants in log *      Drains: * No LDAs found *    Findings:  Infection Present At Time Of Surgery (PATOS) (choose all levels that have infection present):  No infection present        Scope withdrawal time: 12 min    Description of Procedure:  Informed consent was obtained from the patient after explanation of the procedure including indications, description of the procedure,  benefits and possible risks and complications of the procedure, and alternatives. Questions were answered.  The patient's history was reviewed and a directed physical examination was performed prior to the procedure.    Patient was monitored throughout the procedure with pulse oximetry and periodic assessment of vital signs. Patient was sedated as noted above. With the patient initially in the left lateral decubitus position, a digital rectal examination was performed and revealed negative without mass, lesions or tenderness.  The Olympus video colonoscope was placed in the patient's rectum and advanced without difficulty  to the cecum, which was identified by the ileocecal valve and appendiceal orifice.  The prep was good.  Examination of the mucosa was performed during both introduction and withdrawal of the colonoscope. Retroflexed view of the rectum was performed.  The patient  was taken to the recovery area in good condition.     Findings:     1.  Mild sigmoid

## 2025-05-08 NOTE — ANESTHESIA POSTPROCEDURE EVALUATION
Department of Anesthesiology  Postprocedure Note    Patient: Verena Elliott  MRN: [id number]  YOB: 1955  Date of evaluation: 5/8/2025    Procedure Summary       Date: 05/08/25 Room / Location: [address]    Anesthesia Start: 0904 Anesthesia Stop: 0935    Procedure: COLORECTAL CANCER SCREENING, NOT HIGH RISK Diagnosis:       Colon cancer screening      (Colon cancer screening [Z12.11])    Surgeons: Anatoly Anderson MD Responsible Provider: Loki Navarro DO    Anesthesia Type: general, TIVA ASA Status: 3            Anesthesia Type: No value filed.    Nehemias Phase I: Nehemias Score: 10    Nehemias Phase II: Nehemias Score: 8    Anesthesia Post Evaluation    Patient location during evaluation: PACU  Patient participation: complete - patient participated  Level of consciousness: awake and alert  Airway patency: patent  Nausea & Vomiting: no nausea and no vomiting  Cardiovascular status: hemodynamically stable  Respiratory status: acceptable  Hydration status: stable  Pain management: adequate    No notable events documented.

## 2025-05-08 NOTE — ANESTHESIA PRE PROCEDURE
Department of Anesthesiology  Preprocedure Note       Name:  Verena Elliott   Age:  70 y.o.  :  1955                                          MRN:  [id number]         Date:  2025      Surgeon: Surgeon(s):  Anatoly Anderson MD    Procedure: Procedure(s):  COLORECTAL CANCER SCREENING, NOT HIGH RISK    Medications prior to admission:   Prior to Admission medications    Medication Sig Start Date End Date Taking? Authorizing Provider   Multiple Vitamins-Minerals (THERAPEUTIC MULTIVITAMIN-MINERALS) tablet Take 1 tablet by mouth daily   Yes Provider, MD Dulce   bisacodyl 5 MG EC tablet Take as directed for bowel prep/colonoscopy 25  Yes Anatoly Anderson MD   albuterol sulfate HFA (PROVENTIL HFA) 108 (90 Base) MCG/ACT inhaler Inhale 2 puffs into the lungs every 6 hours as needed for Wheezing 23  Yes Romina Reyes, APRN - CNP   metFORMIN (GLUCOPHAGE-XR) 500 MG extended release tablet Take 2 tablets by mouth daily (with breakfast) 25   Claritza De León MD   famotidine (PEPCID) 40 MG tablet Take 1 tablet by mouth every evening 25   Claritza De León MD   metoprolol tartrate (LOPRESSOR) 25 MG tablet Take 1 tablet by mouth daily 25   Claritza De León MD   atorvastatin (LIPITOR) 80 MG tablet Take 1 tablet by mouth daily 25   Claritza De León MD   hydroCHLOROthiazide 12.5 MG capsule Take 1 capsule by mouth every morning 25   Claritza De León MD   Calcium 250 MG CAPS Take 1 tablet by mouth daily 25   Claritza De León MD   vitamin D 50 MCG (2000 UT) CAPS capsule Take 1 capsule by mouth daily 25   Claritza De León MD   Biotin 300 MCG TABS Take 1 tablet by mouth daily 24   Claritza De León MD   Insulin Syringe-Needle U-100 (TRUEPLUS INSULIN SYRINGE) 30G X 5/16\" 1 ML MISC USE AS DIRECTED FOR INSULIN EVERY DAY 24   Donal Cook DO   diclofenac sodium (VOLTAREN) 1 % GEL Apply 4 g topically 4 times daily as needed for Pain

## 2025-05-09 ENCOUNTER — TELEPHONE (OUTPATIENT)
Dept: FAMILY MEDICINE CLINIC | Age: 70
End: 2025-05-09

## 2025-05-09 NOTE — TELEPHONE ENCOUNTER
Sent a My Chart message to advise pt her pt. Assistance ozempic and Novolin is ready to   also left a message on voice mail

## 2025-05-12 ENCOUNTER — RESULTS FOLLOW-UP (OUTPATIENT)
Dept: FAMILY MEDICINE CLINIC | Age: 70
End: 2025-05-12

## 2025-05-12 DIAGNOSIS — M75.112 INCOMPLETE TEAR OF LEFT ROTATOR CUFF, UNSPECIFIED WHETHER TRAUMATIC: Primary | ICD-10-CM

## 2025-05-12 NOTE — RESULT ENCOUNTER NOTE
Significant tear in one of the rotator cuff tendons with cyst formation likely the cause of swelling and warmth along with mild osteoarthritis changes likely contributing to her pain weakness and limited motion.  I suggest physical therapy, cold compression and Tylenol.  Can consider steroid shot if symptoms worsen or does not improve

## 2025-05-14 ENCOUNTER — OFFICE VISIT (OUTPATIENT)
Dept: FAMILY MEDICINE CLINIC | Age: 70
End: 2025-05-14
Payer: COMMERCIAL

## 2025-05-14 ENCOUNTER — HOSPITAL ENCOUNTER (OUTPATIENT)
Age: 70
Setting detail: SPECIMEN
Discharge: HOME | End: 2025-05-14

## 2025-05-14 ENCOUNTER — TELEPHONE (OUTPATIENT)
Dept: FAMILY MEDICINE CLINIC | Age: 70
End: 2025-05-14

## 2025-05-14 VITALS
BODY MASS INDEX: 34.21 KG/M2 | WEIGHT: 218.4 LBS | DIASTOLIC BLOOD PRESSURE: 72 MMHG | SYSTOLIC BLOOD PRESSURE: 128 MMHG | HEART RATE: 81 BPM | OXYGEN SATURATION: 97 % | TEMPERATURE: 97.1 F

## 2025-05-14 DIAGNOSIS — K13.0 ANGULAR CHEILITIS: ICD-10-CM

## 2025-05-14 DIAGNOSIS — E11.9 TYPE 2 DIABETES MELLITUS WITHOUT COMPLICATION, UNSPECIFIED WHETHER LONG TERM INSULIN USE (HCC): ICD-10-CM

## 2025-05-14 DIAGNOSIS — G89.29 CHRONIC LEFT SHOULDER PAIN: ICD-10-CM

## 2025-05-14 DIAGNOSIS — Z00.00 HEALTHCARE MAINTENANCE: ICD-10-CM

## 2025-05-14 DIAGNOSIS — I10 BENIGN ESSENTIAL HTN: ICD-10-CM

## 2025-05-14 DIAGNOSIS — M25.512 CHRONIC LEFT SHOULDER PAIN: ICD-10-CM

## 2025-05-14 DIAGNOSIS — K13.0 CRACKED LIPS: ICD-10-CM

## 2025-05-14 DIAGNOSIS — E55.9 VITAMIN D DEFICIENCY: ICD-10-CM

## 2025-05-14 DIAGNOSIS — E11.9 TYPE 2 DIABETES MELLITUS WITHOUT COMPLICATION, UNSPECIFIED WHETHER LONG TERM INSULIN USE (HCC): Primary | ICD-10-CM

## 2025-05-14 DIAGNOSIS — M25.512 ACUTE PAIN OF LEFT SHOULDER: ICD-10-CM

## 2025-05-14 LAB
25(OH)D3 SERPL-MCNC: 30.3 NG/ML (ref 30–100)
ALBUMIN SERPL-MCNC: 4.3 G/DL (ref 3.5–5.2)
ALBUMIN/GLOB SERPL: 1.3 {RATIO} (ref 1–2.5)
ALP SERPL-CCNC: 86 U/L (ref 35–104)
ALT SERPL-CCNC: 39 U/L (ref 10–35)
ANION GAP SERPL CALCULATED.3IONS-SCNC: 11 MMOL/L (ref 9–16)
AST SERPL-CCNC: 36 U/L (ref 10–35)
BASOPHILS # BLD: 0.07 K/UL (ref 0–0.2)
BASOPHILS NFR BLD: 1 % (ref 0–2)
BILIRUB DIRECT SERPL-MCNC: 0.3 MG/DL (ref 0–0.2)
BILIRUB INDIRECT SERPL-MCNC: 0.4 MG/DL (ref 0–1)
BILIRUB SERPL-MCNC: 0.7 MG/DL (ref 0–1.2)
BUN SERPL-MCNC: 15 MG/DL (ref 8–23)
CALCIUM SERPL-MCNC: 10.3 MG/DL (ref 8.6–10.4)
CHLORIDE SERPL-SCNC: 101 MMOL/L (ref 98–107)
CO2 SERPL-SCNC: 30 MMOL/L (ref 20–31)
CREAT SERPL-MCNC: 0.9 MG/DL (ref 0.6–0.9)
EOSINOPHIL # BLD: 0.22 K/UL (ref 0–0.44)
EOSINOPHILS RELATIVE PERCENT: 3 % (ref 1–4)
ERYTHROCYTE [DISTWIDTH] IN BLOOD BY AUTOMATED COUNT: 12.1 % (ref 11.8–14.4)
FERRITIN SERPL-MCNC: 286 NG/ML
FOLATE SERPL-MCNC: 24.7 NG/ML (ref 4.8–24.2)
GFR, ESTIMATED: 70 ML/MIN/1.73M2
GLOBULIN SER CALC-MCNC: 3.3 G/DL
GLUCOSE SERPL-MCNC: 189 MG/DL (ref 74–99)
HBA1C MFR BLD: 6.7 %
HCT VFR BLD AUTO: 44.1 % (ref 36.3–47.1)
HGB BLD-MCNC: 14.7 G/DL (ref 11.9–15.1)
IMM GRANULOCYTES # BLD AUTO: <0.03 K/UL (ref 0–0.3)
IMM GRANULOCYTES NFR BLD: 0 %
IRON SATN MFR SERPL: 45 % (ref 20–55)
IRON SERPL-MCNC: 118 UG/DL (ref 37–145)
LYMPHOCYTES NFR BLD: 2.31 K/UL (ref 1.1–3.7)
LYMPHOCYTES RELATIVE PERCENT: 28 % (ref 24–43)
MCH RBC QN AUTO: 31.6 PG (ref 25.2–33.5)
MCHC RBC AUTO-ENTMCNC: 33.3 G/DL (ref 28.4–34.8)
MCV RBC AUTO: 94.8 FL (ref 82.6–102.9)
MONOCYTES NFR BLD: 0.69 K/UL (ref 0.1–1.2)
MONOCYTES NFR BLD: 8 % (ref 3–12)
NEUTROPHILS NFR BLD: 60 % (ref 36–65)
NEUTS SEG NFR BLD: 4.86 K/UL (ref 1.5–8.1)
NRBC BLD-RTO: 0 PER 100 WBC
PLATELET # BLD AUTO: 185 K/UL (ref 138–453)
PMV BLD AUTO: 13 FL (ref 8.1–13.5)
POTASSIUM SERPL-SCNC: 4.2 MMOL/L (ref 3.7–5.3)
PROT SERPL-MCNC: 7.6 G/DL (ref 6.6–8.7)
RBC # BLD AUTO: 4.65 M/UL (ref 3.95–5.11)
SODIUM SERPL-SCNC: 142 MMOL/L (ref 136–145)
TIBC SERPL-MCNC: 261 UG/DL (ref 250–450)
UNSATURATED IRON BINDING CAPACITY: 143 UG/DL (ref 112–347)
VIT B12 SERPL-MCNC: 534 PG/ML (ref 232–1245)
WBC OTHER # BLD: 8.2 K/UL (ref 3.5–11.3)

## 2025-05-14 PROCEDURE — 3074F SYST BP LT 130 MM HG: CPT

## 2025-05-14 PROCEDURE — 3078F DIAST BP <80 MM HG: CPT

## 2025-05-14 PROCEDURE — 1159F MED LIST DOCD IN RCRD: CPT

## 2025-05-14 PROCEDURE — 99214 OFFICE O/P EST MOD 30 MIN: CPT

## 2025-05-14 PROCEDURE — 3044F HG A1C LEVEL LT 7.0%: CPT

## 2025-05-14 PROCEDURE — 1123F ACP DISCUSS/DSCN MKR DOCD: CPT

## 2025-05-14 PROCEDURE — 83036 HEMOGLOBIN GLYCOSYLATED A1C: CPT

## 2025-05-14 RX ORDER — SODIUM, POTASSIUM,MAG SULFATES 17.5-3.13G
SOLUTION, RECONSTITUTED, ORAL ORAL
COMMUNITY
Start: 2025-05-02 | End: 2025-05-14

## 2025-05-14 RX ORDER — ALBUTEROL SULFATE 90 UG/1
2 INHALANT RESPIRATORY (INHALATION) EVERY 6 HOURS PRN
Qty: 18 G | Refills: 3 | Status: SHIPPED | OUTPATIENT
Start: 2025-05-14

## 2025-05-14 ASSESSMENT — ENCOUNTER SYMPTOMS
SHORTNESS OF BREATH: 0
COUGH: 0
ABDOMINAL PAIN: 0
WHEEZING: 0

## 2025-05-14 NOTE — ASSESSMENT & PLAN NOTE
- Chronic at goal  - Blood glucose levels well-controlled with A1C of 6.7  - Reduce insulin dosage from 50 units to 48 units  - Send message to Sally for further advice on administration of 48 units  - Patient currently on Ozempic with no recent changes to insulin dosage    Orders:    POCT glycosylated hemoglobin (Hb A1C)    Iron and TIBC; Future    Ferritin; Future    Vitamin C; Future    Zinc; Future

## 2025-05-14 NOTE — PROGRESS NOTES
Verena Elliott (:  1955) is a 70 y.o. female,Established patient, here for evaluation of the following chief complaint(s):  3 Month Follow-Up (Mouth Sores)    H&P    This is a 69 years old female with Hx of essential HTN, CAD on Lopressor, Lipitor, fenofibrate, aspirin, GERD on Pepcid, DM type II on Ozempic and NovoLog 116 units in the morning, mild intermittent asthma well-controlled on rescue inhaler, osteoarthritis of multiple joints, hair loss, Kidney stones, rotator cuff tear on the left side    History of Present Illness  The patient presents for evaluation of shoulder pain, angular cheilitis, and diabetes mellitus.    Shoulder Pain  - Diagnosed with a torn shoulder, confirmed through an MRI following an initial ultrasound.  - Scheduled to start physical therapy next Thursday.  - Recalls a previous knee issue that was successfully resolved with physical therapy and hopes for a similar outcome with her shoulder.    Angular Cheilitis  - Developed cracks on the sides of her mouth a few months ago.  - Consulted a nurse practitioner after 4 weeks, who suggested she was using the wrong type of lip balm.  - Switched to Blistex as recommended, but after 2 weeks, reported no improvement.  - Nurse practitioner was going to prescribe something, but she has not received it yet.  - Condition persists, causing pain when she opens her mouth or chews.  - Takes a multivitamin for women over 50 but does not take biotin supplements.  - Reports no changes in her diet, including meat consumption.  - Reports no chest pain, leg swelling, gum issues, or bleeding.  - Not under the care of any specialists and reports no urinary leakage.  - Has not taken vitamin D 2000 units for some time.  - Reports no new symptoms of fatigue or tiredness but notes a slight decrease in mobility due to her age.  - Has been applying Blistex and Neosporin to the affected area.    Diabetes Mellitus  - Blood sugar level was elevated during a

## 2025-05-14 NOTE — ASSESSMENT & PLAN NOTE
Chronic, at goal (stable), continue current treatment plan    Relevant Current Medications     aspirin (ASPIRIN LOW DOSE) 81 MG EC tablet     TAKE 1 TABLET BY MOUTH ONCE DAILY      fenofibrate (TRIGLIDE) 160 MG tablet     TAKE 1 TABLET BY MOUTH DAILY      metoprolol tartrate (LOPRESSOR) 25 MG tablet     Take 1 tablet by mouth daily      atorvastatin (LIPITOR) 80 MG tablet     Take 1 tablet by mouth daily      hydroCHLOROthiazide 12.5 MG capsule     Take 1 capsule by mouth every morning     Orders:    Iron and TIBC; Future    Ferritin; Future    Vitamin C; Future    Zinc; Future

## 2025-05-14 NOTE — ASSESSMENT & PLAN NOTE
- New uncertain prognosis  - Likely due to weather changes and potential vitamin deficiency  - Comprehensive blood workup today to assess vitamin levels (including B12 and iron)  - Continue using current cream and Vaseline until results are available  - Recommend appropriate supplementation if vitamin deficiency is confirmed    Orders:    Vitamin B12 & Folate; Future    Vitamin B6; Future    CBC with Auto Differential; Future    Basic Metabolic Panel; Future    Hepatic Function Panel; Future    Iron and TIBC; Future    Ferritin; Future    Vitamin C; Future    Zinc; Future

## 2025-05-14 NOTE — ASSESSMENT & PLAN NOTE
- Chronic not at goal  - Torn shoulder confirmed by MRI  - Start physical therapy next Thursday  - Consider injections if no improvement with physical therapy  - Patient optimistic about physical therapy outcome based on past experiences with knee issues    Orders:    Vitamin C; Future    Zinc; Future

## 2025-05-14 NOTE — TELEPHONE ENCOUNTER
The patient's MRI of the left shoulder was performed on 5/7/25, and apparently authorization from insurance had not been obtained yet. The patient states that she talked to the [facility] authorization department and who said that they were going to resubmit, and was directed to contact her insurance again. Insurance directed her to contact the doctors office and advise that we need to call [phone] within 72 hours to correct the coverage for the MRI? I advised I would send the information to Edilia as she requested.

## 2025-05-14 NOTE — ASSESSMENT & PLAN NOTE
- Colonoscopy 5/2025 normal results with no polyps detected  - No pain during bowel movements but occasional discomfort  - Currently taking multivitamin for women over 50  - Cholesterol levels not checked today (not fasting and not due for another check for 2 months)

## 2025-05-14 NOTE — ASSESSMENT & PLAN NOTE
Chronic, not at goal (unstable), needs to recheck labs    Orders:    Vitamin D 25 Hydroxy; Future    Iron and TIBC; Future    Ferritin; Future    Vitamin C; Future    Zinc; Future

## 2025-05-14 NOTE — TELEPHONE ENCOUNTER
I spoke with Mariel and let her knwo that since the test was done prior to approval [facility] will have to write off the bill she verbally understood

## 2025-05-16 LAB — ZINC SERPL-MCNC: 90.2 UG/DL (ref 60–120)

## 2025-05-18 LAB — VIT C SERPL-MCNC: 27 UMOL/L (ref 23–114)

## 2025-05-21 ENCOUNTER — TELEPHONE (OUTPATIENT)
Dept: FAMILY MEDICINE CLINIC | Age: 70
End: 2025-05-21

## 2025-05-21 ENCOUNTER — RESULTS FOLLOW-UP (OUTPATIENT)
Dept: FAMILY MEDICINE CLINIC | Age: 70
End: 2025-05-21

## 2025-05-21 LAB — PYRIDOXAL PHOS SERPL-SCNC: 104.7 NMOL/L (ref 20–125)

## 2025-05-21 RX ORDER — VALACYCLOVIR HYDROCHLORIDE 1 G/1
1000 TABLET, FILM COATED ORAL 2 TIMES DAILY
Qty: 14 TABLET | Refills: 0 | Status: SHIPPED | OUTPATIENT
Start: 2025-05-21 | End: 2025-05-28

## 2025-05-21 NOTE — RESULT ENCOUNTER NOTE
All labs and vitamins are okay so likely her sore related to viral infection I will send her medication for that

## 2025-05-21 NOTE — TELEPHONE ENCOUNTER
Patient was calling to get her results, I did let her know that they have not been viewed yet by the provider and as soon as she does we will giver her a call she verbally understood

## 2025-05-22 ENCOUNTER — HOSPITAL ENCOUNTER (OUTPATIENT)
Dept: PHYSICAL THERAPY | Facility: CLINIC | Age: 70
Setting detail: THERAPIES SERIES
Discharge: HOME | End: 2025-05-22
Payer: COMMERCIAL

## 2025-05-22 PROCEDURE — 97161 PT EVAL LOW COMPLEX 20 MIN: CPT

## 2025-05-22 PROCEDURE — 97110 THERAPEUTIC EXERCISES: CPT

## 2025-05-22 NOTE — PLAN OF CARE
or equal to 4+/5 L shoulder strength and 4/5 L scapular mm strength to assist with ADLs, house keeping, cooking, and crocheting.  []  []  []      Function: Pt will score greater than or equal to 70% on the UEFS in order to demonstrate improved function []  []  []                        Patient goals: to be able to use the arm for every day activities      Rehab Potential:  [x] Good  [] Fair  [] Poor    Suggested Professional Referral:  [] No  [x] Yes: further evaluation with ortho may be warranted if symptoms/function does not improve  Barriers to Goal Achievement:  [x] No  [] Yes:  Domestic Concerns:  [x] No  [] Yes:     Pt. Education:  [x] Plans/Goals, Risks/Benefits discussed  [x] Home exercise program  Method of Education: [x] Verbal  [x] Demo  [x] Written  Access Code: [email or web address]  Exercises  - Seated Scapular Retraction  - 10 x daily - 2 sets - 5 reps  - Shoulder Flexion Wall Slide with Towel  - 2-3 x daily - 2 sets - 5 reps  - Scaption Wall Slide with Towel  - 2-3 x daily - 2 sets - 5 reps  - Standing Isometric Shoulder Flexion with Doorway - Arm Bent  - 1-2 x daily - 5 reps - 5\" hold  - Standing Isometric Shoulder Abduction with Doorway - Arm Bent  - 1-2 x daily - 5 reps - 5\" hold  - Standing Isometric Shoulder Extension with Doorway - Arm Bent  - 1-2 x daily - 5 reps - 5\" hold  Comprehension of Education:  [x] Verbalizes understanding.  [x] Demonstrates understanding.  [x] Needs Review.  [] Demonstrates/verbalizes understanding of HEP/Ed previously given.     Treatment Plan:  [x] Therapeutic Exercise   86677             [] Iontophoresis: 4 mg/mL Dexamethasone Sodium Phosphate  mAmin  32270   [x] Therapeutic Activity  75139 [x] Vasopneumatic cold with compression  75949               [] Gait Training    98027 [] Ultrasound        03610   [x] Neuromuscular Re-education  53870 [] Electrical Stimulation Unattended  38703   [x] Manual Therapy  98803 [] Electrical Stimulation Attended  78558   [x] Instruction

## 2025-05-22 NOTE — CONSULTS
Isometric Shoulder Abduction with Doorway - Arm Bent  - 1-2 x daily - 5 reps - 5\" hold  - Standing Isometric Shoulder Extension with Doorway - Arm Bent  - 1-2 x daily - 5 reps - 5\" hold  Comprehension of Education:  [x] Verbalizes understanding.  [x] Demonstrates understanding.  [x] Needs Review.  [] Demonstrates/verbalizes understanding of HEP/Ed previously given.    Treatment Plan:  [x] Therapeutic Exercise   69648  [] Iontophoresis: 4 mg/mL Dexamethasone Sodium Phosphate  mAmin  28241   [x] Therapeutic Activity  87326 [x] Vasopneumatic cold with compression  85519    [] Gait Training   15556 [] Ultrasound   90052   [x] Neuromuscular Re-education  17761 [] Electrical Stimulation Unattended  58042   [x] Manual Therapy  47370 [] Electrical Stimulation Attended  58318   [x] Instruction in HEP  [] Lumbar/Cervical Traction  19995   [] Aquatic Therapy   07606 [x] Cold/hotpack    [] Massage   03012      [] Dry Needling, 1 or 2 muscles  83556   [] Biofeedback, first 15 minutes   82144  [] Biofeedback, additional 15 minutes   32741 [] Dry Needling, 3 or more muscles  79120     []  Medication allergies reviewed for use of    Dexamethasone Sodium Phosphate 4mg/ml     with iontophoresis treatments.   Pt is not allergic.    Frequency: 2 x/week for 12 visits    Today’s Treatment:  Modalities:  Precautions: does experience SOB with exertion  Exercises:  Exercise Reps/ Time Weight/ Level Comments   Scapular retractions 10x  Muscle tremoring   Wall slides 5x ea  Flexion  Scaption    Shoulder isometrics 5x5\"  Flexion  Abd  Extension                Other:    Specific Instructions for next treatment: improve pain, PROM/AROM, scapular stability/strength, rotator cuff mm activation    Evaluation Complexity:  History (Personal factors, comorbidities) [] 0 [] 1-2 [x] 3+   Exam (limitations, restrictions) [] 1-2 [] 3 [x] 4+   Clinical presentation (progression) [x] Stable [] Evolving  [] Unstable   Decision Making [x] Low []

## 2025-05-29 ENCOUNTER — HOSPITAL ENCOUNTER (OUTPATIENT)
Dept: PHYSICAL THERAPY | Facility: CLINIC | Age: 70
Setting detail: THERAPIES SERIES
Discharge: HOME | End: 2025-05-29
Payer: COMMERCIAL

## 2025-05-29 PROCEDURE — 97110 THERAPEUTIC EXERCISES: CPT

## 2025-05-29 NOTE — FLOWSHEET NOTE
3          Assessment: [x] Progressing toward goals. Reviewed HEP to ensure proper execution, pt with good understanding. Completed ROM and strengthening interventions to tolerance. Discussed use of ice and heat to manage sx's. Educated on DOMS. No complaints throughout session.    [] No change.     [] Other:  [x] Patient would continue to benefit from skilled physical therapy services in order to: to improve L shoulder pain and function to assist with completing every day activities. If pt does not demonstrate improvement in pain and function in 6 visits, pt will benefit from an orthopedic evaluation.     STG/LTG  Problems:  L shoulder  [x] ? Pain: >5/10  [x] ? ROM: decreased P/AROM  [x] ? Strength: decreased L shoulder and scapular strength  [x] ? Function:UEFS: 50% max function  [] Other:            Short Term Goals: 6 Visits Status   Addressed on: MET ONGOING NOT MET COMMENT   Pain: Pt will report less than or equal to 4-5/10 L shoulder pain with pulling up her pants, doing her hair, reaching behind her back, and lifting a light pot off the stove top to progress back to PLOF with limited difficulty.  []  []  []      ROM:Pt will demonstrate improvements in L shoulder AROM in order to assist with reaching overhead, out to the side, and behind the back to assist with ADLs, self-care, housework, and crafting.  Flexion: 120 deg  Abduction: 90 deg  IR: mid line of sacrum  ER: top of L shoulder with evidence of shoulder flexion []  []  []      Strength: Pt will demonstrate 10 scapular retractions without presence of muscle tremoring to promote more upright posture to assist with reaching overhead. []  []  []      Function: Pt will score greater than or equal to 60% on the UEFS in order to demonstrate improved function with ADLs and work related tasks. []  []  []      HEP: Pt will be independent in with HEP. []  []  []      Long-Term Goals: 12 Visits   Addressed on:            Pain: Pt will report less than or equal to

## 2025-05-30 PROBLEM — Z12.11 COLON CANCER SCREENING: Status: RESOLVED | Noted: 2025-04-30 | Resolved: 2025-05-30

## 2025-06-03 ENCOUNTER — HOSPITAL ENCOUNTER (OUTPATIENT)
Dept: PHYSICAL THERAPY | Facility: CLINIC | Age: 70
Setting detail: THERAPIES SERIES
Discharge: HOME | End: 2025-06-03
Payer: COMMERCIAL

## 2025-06-03 PROCEDURE — 97110 THERAPEUTIC EXERCISES: CPT

## 2025-06-03 NOTE — FLOWSHEET NOTE
[] [facility]  Outpatient Rehabilitation &  Therapy  [address]  [phone]  [phone] [x] [facility]  Outpatient Rehabilitation &  Therapy  [address]  P: [phone]  F: [phone] [] [facility]  Outpatient Rehabilitation &  Therapy  [address]  P: [phone]  F: [phone] [] [facility]  Outpatient Rehabilitation &  Therapy  [address]  [phone]  [phone] [] [facility]  Outpatient Rehabilitation &  Therapy  [address]   P: [phone]  F: [phone]  [] [facility]  Outpatient Rehabilitation &  Therapy  [address]  P: [phone]  F: [phone] [] [facility]  Outpatient Rehabilitation &  Therapy  [address]  P: [phone]  F: [phone] [] [facility]  Outpatient Rehabilitation &  Therapy  [address]  P: [phone]  F: [phone] [] [facility]  Outpatient Rehabilitation &  Therapy  [address]  P: [phone]  F: [phone]  [] [facility] Outpatient Rehabilitation &  Therapy  [address]  P: [phone]  F: [phone]     Physical Therapy Daily Treatment Note    Date:  6/3/2025  Patient Name:  Verena Elliott    :  1955  MRN: [id number]  Physician: Claritza De León MD  Insurance:   Primary Ins: [employer] [facility] HMO-POS   Auth after 12th visit    DRY NEEDLING IS NOT COVERED    Medical Diagnosis: M75.112 (ICD-10-CM) - Incomplete tear of left rotator cuff                   Rehab Codes: M25.512, M25.612, R29.3, M62.812, Z73.6  Onset Date: 25               Next 's appt: 25  Visit# / total visits: 3/12    Cancels/No Shows: 0    Subjective:    Pain:  [x] Yes  [] No Location: L shoulder Pain Rating: (0-10 scale) sore/10  Pain

## 2025-06-05 ENCOUNTER — APPOINTMENT (OUTPATIENT)
Dept: PHYSICAL THERAPY | Facility: CLINIC | Age: 70
End: 2025-06-05
Payer: COMMERCIAL

## 2025-06-11 ENCOUNTER — OFFICE VISIT (OUTPATIENT)
Dept: FAMILY MEDICINE CLINIC | Age: 70
End: 2025-06-11
Payer: COMMERCIAL

## 2025-06-11 ENCOUNTER — HOSPITAL ENCOUNTER (OUTPATIENT)
Dept: PHYSICAL THERAPY | Facility: CLINIC | Age: 70
Setting detail: THERAPIES SERIES
Discharge: HOME | End: 2025-06-11
Payer: COMMERCIAL

## 2025-06-11 VITALS
DIASTOLIC BLOOD PRESSURE: 68 MMHG | OXYGEN SATURATION: 99 % | TEMPERATURE: 97.2 F | WEIGHT: 222 LBS | SYSTOLIC BLOOD PRESSURE: 132 MMHG | HEART RATE: 75 BPM | BODY MASS INDEX: 34.77 KG/M2

## 2025-06-11 DIAGNOSIS — R05.1 ACUTE COUGH: ICD-10-CM

## 2025-06-11 DIAGNOSIS — J45.20 MILD INTERMITTENT ASTHMA WITHOUT COMPLICATION: Primary | ICD-10-CM

## 2025-06-11 PROCEDURE — 1160F RVW MEDS BY RX/DR IN RCRD: CPT | Performed by: NURSE PRACTITIONER

## 2025-06-11 PROCEDURE — 97110 THERAPEUTIC EXERCISES: CPT

## 2025-06-11 PROCEDURE — 1123F ACP DISCUSS/DSCN MKR DOCD: CPT | Performed by: NURSE PRACTITIONER

## 2025-06-11 PROCEDURE — 3078F DIAST BP <80 MM HG: CPT | Performed by: NURSE PRACTITIONER

## 2025-06-11 PROCEDURE — 3075F SYST BP GE 130 - 139MM HG: CPT | Performed by: NURSE PRACTITIONER

## 2025-06-11 PROCEDURE — 99213 OFFICE O/P EST LOW 20 MIN: CPT | Performed by: NURSE PRACTITIONER

## 2025-06-11 PROCEDURE — 1159F MED LIST DOCD IN RCRD: CPT | Performed by: NURSE PRACTITIONER

## 2025-06-11 RX ORDER — BENZONATATE 100 MG/1
100 CAPSULE ORAL 2 TIMES DAILY PRN
Qty: 20 CAPSULE | Refills: 0 | Status: SHIPPED | OUTPATIENT
Start: 2025-06-11 | End: 2025-06-21

## 2025-06-11 RX ORDER — METHYLPREDNISOLONE 4 MG/1
TABLET ORAL
Qty: 1 KIT | Refills: 0 | Status: SHIPPED | OUTPATIENT
Start: 2025-06-11 | End: 2025-06-17

## 2025-06-11 ASSESSMENT — ENCOUNTER SYMPTOMS
GASTROINTESTINAL NEGATIVE: 1
ALLERGIC/IMMUNOLOGIC NEGATIVE: 1
COUGH: 1
EYES NEGATIVE: 1
CHEST TIGHTNESS: 0
ABDOMINAL PAIN: 0
EYE PAIN: 0
SHORTNESS OF BREATH: 0

## 2025-06-11 NOTE — FLOWSHEET NOTE
[] [facility]  Outpatient Rehabilitation &  Therapy  [address]  [phone]  [phone] [x] [facility]  Outpatient Rehabilitation &  Therapy  [address]  P: [phone]  F: [phone] [] [facility]  Outpatient Rehabilitation &  Therapy  [address]  P: [phone]  F: [phone] [] [facility]  Outpatient Rehabilitation &  Therapy  [address]  [phone]  [phone] [] [facility]  Outpatient Rehabilitation &  Therapy  [address]   P: [phone]  F: [phone]  [] [facility]  Outpatient Rehabilitation &  Therapy  [address]  P: [phone]  F: [phone] [] [facility]  Outpatient Rehabilitation &  Therapy  [address]  P: [phone]  F: [phone] [] [facility]  Outpatient Rehabilitation &  Therapy  [address]  P: [phone]  F: [phone] [] [facility]  Outpatient Rehabilitation &  Therapy  [address]  P: [phone]  F: [phone]  [] [facility] Outpatient Rehabilitation &  Therapy  [address]  P: [phone]  F: [phone]     Physical Therapy Daily Treatment Note    Date:  2025  Patient Name:  Verena Elliott    :  1955  MRN: [id number]  Physician: Claritza De León MD  Insurance:   Primary Ins: [employer] [facility] HMO-POS   Auth after 12th visit    DRY NEEDLING IS NOT COVERED    Medical Diagnosis: M75.112 (ICD-10-CM) - Incomplete tear of left rotator cuff                   Rehab Codes: M25.512, M25.612, R29.3, M62.812, Z73.6  Onset Date: 25               Next 's appt: 25  Visit# / total visits:     Cancels/No Shows: 0    Subjective:    Pain:  [x] Yes  [] No Location: L shoulder Pain Rating: (0-10 scale) sore/10  Pain

## 2025-06-11 NOTE — PROGRESS NOTES
[facility]     Date of Visit:  2025  Patient Name: Verena Elliott   Patient :  1955     CHIEF COMPLAINT:     Verena Elliott is a 70 y.o. female who presents today for an general visit to be evaluated for the following condition(s):  Chief Complaint   Patient presents with    Cough     For a month        HISTORY OF PRESENT ILLNESS:       HPI     Here today with concerns of an ongoing dry cough for the past 3 weeks. She has tried only using inhaler for relief without success. Reports using Mucinex occasionally at night, which does help with sleep.  She has a humidifier at home, but has not tried.     REVIEW OF SYSTEMS:      Review of Systems   Constitutional: Negative.  Negative for activity change and appetite change.   HENT: Negative.  Negative for ear pain, hearing loss and tinnitus.    Eyes: Negative.  Negative for pain and visual disturbance.   Respiratory:  Positive for cough. Negative for chest tightness and shortness of breath.    Cardiovascular: Negative.  Negative for chest pain.   Gastrointestinal: Negative.  Negative for abdominal pain.   Endocrine: Negative.    Genitourinary: Negative.  Negative for dysuria, frequency and urgency.   Musculoskeletal: Negative.  Negative for arthralgias and myalgias.   Skin: Negative.    Allergic/Immunologic: Negative.    Neurological: Negative.  Negative for dizziness and light-headedness.   Hematological: Negative.    Psychiatric/Behavioral: Negative.  Negative for agitation and hallucinations.    All other systems reviewed and are negative.       REVIEWED INFORMATION      Current Outpatient Medications   Medication Sig Dispense Refill    methylPREDNISolone (MEDROL DOSEPACK) 4 MG tablet Take by mouth. 1 kit 0    benzonatate (TESSALON) 100 MG capsule Take 1 capsule by mouth 2 times daily as needed for Cough 20 capsule 0    miconazole (MICOTIN) 2 % cream Apply thin layer to the cracked area at the corner of the mouth twice daily, ideally after washing

## 2025-06-13 ENCOUNTER — HOSPITAL ENCOUNTER (OUTPATIENT)
Dept: PHYSICAL THERAPY | Facility: CLINIC | Age: 70
Setting detail: THERAPIES SERIES
Discharge: HOME | End: 2025-06-13
Payer: COMMERCIAL

## 2025-06-13 PROBLEM — Z00.00 HEALTHCARE MAINTENANCE: Status: RESOLVED | Noted: 2024-10-30 | Resolved: 2025-06-13

## 2025-06-13 NOTE — FLOWSHEET NOTE
[] [facility]  Outpatient Rehabilitation &  Therapy  [address]  [phone]  [phone] [x] [facility]  Outpatient Rehabilitation &  Therapy  [address]  P: [phone]  F: [phone] [] [facility]  Outpatient Rehabilitation &  Therapy  [address]  P: [phone]  F: [phone] [] [facility]  Outpatient Rehabilitation &  Therapy  [address]  [phone]  [phone] [] [facility]  Outpatient Rehabilitation &  Therapy  [address]   P: [phone]  F: [phone]  [] [facility]  Outpatient Rehabilitation &  Therapy  [address]  P: [phone]  F: [phone] [] [facility]  Outpatient Rehabilitation &  Therapy  [address]  P: [phone]  F: [phone] [] [facility]  Outpatient Rehabilitation &  Therapy  [address]  P: [phone]  F: [phone] [] [facility]  Outpatient Rehabilitation &  Therapy  [address]  P: [phone]  F: [phone]  [] [facility] Outpatient Rehabilitation &  Therapy  [address]  P: [phone]  F: [phone] [] [facility] Outpatient Rehabilitation &  Therapy  [address]  P: [phone]   F: [phone]      Therapy Cancel/No Show note    Date: 2025  Patient: Verena Elliott  : 1955  MRN: [id number]    Cancels/No Shows to date:     For today's appointment patient:    [x]  Cancelled    [] Rescheduled appointment    [] No-show     Reason given by patient:    []  Patient ill    []  Conflicting appointment    [] No transportation      [] Conflict with work    [] No reason given    [] Weather related    [] COVID-19    [x] Other:      Comments:  No reason given. Next appt confirmed      [x] Next

## 2025-06-17 ENCOUNTER — HOSPITAL ENCOUNTER (OUTPATIENT)
Dept: PHYSICAL THERAPY | Facility: CLINIC | Age: 70
Setting detail: THERAPIES SERIES
Discharge: HOME | End: 2025-06-17
Payer: COMMERCIAL

## 2025-06-17 PROCEDURE — 97110 THERAPEUTIC EXERCISES: CPT

## 2025-06-19 ENCOUNTER — HOSPITAL ENCOUNTER (OUTPATIENT)
Dept: PHYSICAL THERAPY | Facility: CLINIC | Age: 70
Setting detail: THERAPIES SERIES
Discharge: HOME | End: 2025-06-19
Payer: COMMERCIAL

## 2025-06-19 PROCEDURE — 97110 THERAPEUTIC EXERCISES: CPT

## 2025-06-19 PROCEDURE — 97140 MANUAL THERAPY 1/> REGIONS: CPT

## 2025-06-19 NOTE — FLOWSHEET NOTE
Seated Single Arm Shoulder Flexion  - 1 x daily - 7 x weekly - 2 sets - 10 reps  - Standing Shoulder External Rotation with Resistance  - 1 x daily - 7 x weekly - 2 sets - 10 reps  - Standing Shoulder Horizontal Abduction with Resistance  - 1 x daily - 7 x weekly - 2 sets - 10 reps  - Shoulder Flexion Wall Slide with Towel  - 1 x daily - 7 x weekly - 1 sets - 10 reps - 3-5 seconds hold  - Standing Shoulder Abduction Slides at Wall  - 1 x daily - 7 x weekly - 1 sets - 10 reps - 3-5 seconds hold  - Standing Wall Ball Circles with Mini Swiss Ball  - 1 x daily - 7 x weekly - 2 sets - 10 reps  - Standing Bilateral Low Shoulder Row with Anchored Resistance  - 1 x daily - 7 x weekly - 2 sets - 10 reps  - Shoulder extension with resistance - Neutral  - 1 x daily - 7 x weekly - 2 sets - 10 reps  - Isometric Shoulder Flexion at Wall  - 1 x daily - 7 x weekly - 1 sets - 10 reps - 3-5 seconds hold  - Isometric Shoulder Abduction at Wall  - 1 x daily - 7 x weekly - 1 sets - 10 reps - 3-5 seconds hold  - Standing Isometric Shoulder External Rotation with Doorway  - 1 x daily - 7 x weekly - 1 sets - 10 reps - 3-5 seconds hold  - Standing Isometric Shoulder Internal Rotation at Doorway  - 1 x daily - 7 x weekly - 1 sets - 10 reps - 3-5 seconds hold  - Supine Shoulder Flexion Extension AAROM with Dowel  - 1 x daily - 7 x weekly - 1-2 sets - 10 reps  - Supine Shoulder Horizontal Abduction Adduction AAROM with Dowel  - 1 x daily - 7 x weekly - 1-2 sets - 10 reps  - Supine Shoulder External Rotation in 45 Degrees Abduction AAROM with Dowel  - 1 x daily - 7 x weekly - 1-2 sets - 10 reps  - Supine Shoulder Abduction AAROM with Dowel  - 1 x daily - 7 x weekly - 1-2 sets - 10 reps  - Supine Shoulder Press AAROM in Abduction with Dowel  - 1 x daily - 7 x weekly - 1-2 sets - 10 reps  - Supine Single Arm Shoulder Protraction  - 1 x daily - 7 x weekly - 2 sets - 10 reps  - Sidelying Shoulder Horizontal Abduction  - 1 x daily - 7 x weekly - 2

## 2025-06-25 ENCOUNTER — HOSPITAL ENCOUNTER (OUTPATIENT)
Dept: PHYSICAL THERAPY | Facility: CLINIC | Age: 70
Setting detail: THERAPIES SERIES
Discharge: HOME | End: 2025-06-25
Payer: COMMERCIAL

## 2025-06-25 PROCEDURE — 97110 THERAPEUTIC EXERCISES: CPT

## 2025-06-25 PROCEDURE — 97140 MANUAL THERAPY 1/> REGIONS: CPT

## 2025-06-25 NOTE — FLOWSHEET NOTE
[] [facility]  Outpatient Rehabilitation &  Therapy  [address]  [phone]  [phone] [x] [facility]  Outpatient Rehabilitation &  Therapy  [address]  P: [phone]  F: [phone] [] [facility]  Outpatient Rehabilitation &  Therapy  [address]  P: [phone]  F: [phone] [] [facility]  Outpatient Rehabilitation &  Therapy  [address]  [phone]  [phone] [] [facility]  Outpatient Rehabilitation &  Therapy  [address]   P: [phone]  F: [phone]  [] [facility]  Outpatient Rehabilitation &  Therapy  [address]  P: [phone]  F: [phone] [] [facility]  Outpatient Rehabilitation &  Therapy  [address]  P: [phone]  F: [phone] [] [facility]  Outpatient Rehabilitation &  Therapy  [address]  P: [phone]  F: [phone] [] [facility]  Outpatient Rehabilitation &  Therapy  [address]  P: [phone]  F: [phone]  [] [facility] Outpatient Rehabilitation &  Therapy  [address]  P: [phone]  F: [phone]     Physical Therapy Daily Treatment Note    Date:  2025  Patient Name:  Verena Elliott    :  1955  MRN: [id number]    Physician: Claritza De León MD  Insurance:   Primary Ins: [employer] [facility] HMO-POS   Auth after 12th visit    DRY NEEDLING IS NOT COVERED    Medical Diagnosis: M75.112 (ICD-10-CM) - Incomplete tear of left rotator cuff                   Rehab Codes: M25.512, M25.612, R29.3, M62.812, Z73.6  Onset Date: 25               Next 's appt: 25  Visit# / total visits:     Cancels/No Shows: 1/0      Subjective:    Pain:  [x] Yes  [] No Location: L shoulder Pain Rating: (0-10 scale)

## 2025-06-27 ENCOUNTER — HOSPITAL ENCOUNTER (OUTPATIENT)
Dept: PHYSICAL THERAPY | Facility: CLINIC | Age: 70
Setting detail: THERAPIES SERIES
Discharge: HOME | End: 2025-06-27
Payer: COMMERCIAL

## 2025-06-27 PROCEDURE — 97110 THERAPEUTIC EXERCISES: CPT

## 2025-06-27 PROCEDURE — 97140 MANUAL THERAPY 1/> REGIONS: CPT

## 2025-06-27 NOTE — FLOWSHEET NOTE
[] [facility]  Outpatient Rehabilitation &  Therapy  [address]  [phone]  [phone] [x] [facility]  Outpatient Rehabilitation &  Therapy  [address]  P: [phone]  F: [phone] [] [facility]  Outpatient Rehabilitation &  Therapy  [address]  P: [phone]  F: [phone] [] [facility]  Outpatient Rehabilitation &  Therapy  [address]  [phone]  [phone] [] [facility]  Outpatient Rehabilitation &  Therapy  [address]   P: [phone]  F: [phone]  [] [facility]  Outpatient Rehabilitation &  Therapy  [address]  P: [phone]  F: [phone] [] [facility]  Outpatient Rehabilitation &  Therapy  [address]  P: [phone]  F: [phone] [] [facility]  Outpatient Rehabilitation &  Therapy  [address]  P: [phone]  F: [phone] [] [facility]  Outpatient Rehabilitation &  Therapy  [address]  P: [phone]  F: [phone]  [] [facility] Outpatient Rehabilitation &  Therapy  [address]  P: [phone]  F: [phone]     Physical Therapy Daily Treatment Note    Date:  2025  Patient Name:  Verena Elliott    :  1955  MRN: [id number]    Physician: Claritza De León MD  Insurance:   Primary Ins: [employer] [facility] HMO-POS   Auth after 12th visit    DRY NEEDLING IS NOT COVERED    Medical Diagnosis: M75.112 (ICD-10-CM) - Incomplete tear of left rotator cuff                   Rehab Codes: M25.512, M25.612, R29.3, M62.812, Z73.6  Onset Date: 25               Next 's appt: 25  Visit# / total visits:     Cancels/No Shows: 1/0      Subjective:    Pain:  [x] Yes  [] No Location: L shoulder Pain Rating: (0-10 scale)

## 2025-07-03 ENCOUNTER — HOSPITAL ENCOUNTER (OUTPATIENT)
Dept: PHYSICAL THERAPY | Facility: CLINIC | Age: 70
Setting detail: THERAPIES SERIES
Discharge: HOME | End: 2025-07-03
Payer: COMMERCIAL

## 2025-07-03 ENCOUNTER — SCHEDULED TELEPHONE ENCOUNTER (OUTPATIENT)
Dept: FAMILY MEDICINE CLINIC | Age: 70
End: 2025-07-03

## 2025-07-03 DIAGNOSIS — Z79.899 MEDICATION MANAGEMENT: Primary | ICD-10-CM

## 2025-07-03 PROCEDURE — APPNB30 APP NON BILLABLE TIME 0-30 MINS: Performed by: PHARMACIST

## 2025-07-03 PROCEDURE — 97110 THERAPEUTIC EXERCISES: CPT

## 2025-07-03 PROCEDURE — 97140 MANUAL THERAPY 1/> REGIONS: CPT

## 2025-07-03 NOTE — FLOWSHEET NOTE
[] [facility]  Outpatient Rehabilitation &  Therapy  [address]  [phone]  [phone] [x] [facility]  Outpatient Rehabilitation &  Therapy  [address]  P: [phone]  F: [phone] [] [facility]  Outpatient Rehabilitation &  Therapy  [address]  P: [phone]  F: [phone] [] [facility]  Outpatient Rehabilitation &  Therapy  [address]  [phone]  [phone] [] [facility]  Outpatient Rehabilitation &  Therapy  [address]   P: [phone]  F: [phone]  [] [facility]  Outpatient Rehabilitation &  Therapy  [address]  P: [phone]  F: [phone] [] [facility]  Outpatient Rehabilitation &  Therapy  [address]  P: [phone]  F: [phone] [] [facility]  Outpatient Rehabilitation &  Therapy  [address]  P: [phone]  F: [phone] [] [facility]  Outpatient Rehabilitation &  Therapy  [address]  P: [phone]  F: [phone]  [] [facility] Outpatient Rehabilitation &  Therapy  [address]  P: [phone]  F: [phone]     Physical Therapy Daily Treatment Note    Date:  7/3/2025  Patient Name:  Verena Elliott    :  1955  MRN: [id number]    Physician: Claritza De León MD  Insurance:   Primary Ins: [employer] [facility] HMO-POS   Auth after 12th visit    DRY NEEDLING IS NOT COVERED    Medical Diagnosis: M75.112 (ICD-10-CM) - Incomplete tear of left rotator cuff                   Rehab Codes: M25.512, M25.612, R29.3, M62.812, Z73.6  Onset Date: 25               Next 's appt: 25  Visit# / total visits:     Cancels/No Shows: 1/0    Subjective:    Pain:  [x] Yes  [] No Location: L shoulder Pain Rating: (0-10 scale) 5/10  Pain

## 2025-07-03 NOTE — PROGRESS NOTES
Medication Management Service  [facility]  [phone]    Verena Elliott is a 70 y.o. female that presents for a follow-up diabetes mellitus telephone visit with Medication Management Service per referral from Dr. Cook, Donal Lamb,  for Diabetes Management under Collaborative Practice Agreement with A1c goal < 7.0 %. Patient PMH includes ASCVD, Asthma, T2DM, Chronic urticaria, osteoarthritis, HLD, microalbuminuria. Complications from DM Include ASCVD and microalbuminuria.     Subjective/Objective     New Problems/Changes since last visit:   Patient appointment today for review of blood glucose & adherence with DM medications.   Last spoke with PharmD on 04/10/2025.  58 units daily by PCP on 05/14/2025.  Patient reporting high glucose readings only when she eats more carbs.  Last night had 3 chocolate cookies and fasting blood glucose was 223mg/dL this morning.     Continues with part-time work schedule. Continues with payroll and accounts payable.  Working Monday & Tuesday. Enjoys being retired and working around 15-20 hours per weekly.     LIFESTYLE  Diet:  Son has started a new diet.  More protein.  Patient will have steak for dinner every evening with no other sides.  Reports that she does try to eat balance meals for breakfast and lunch.  Today she had 2 dippy eggs with half of a doughnut.  Lunch may be tuna fish or stew.  Less fried foods.  Patient reports losing some weight.  Has incorporated more carbs back into diet.  Last night reports having 1/2 turkey sandwich with macaroni& cheese with beef. Mentioned eating 3 chocolate cooking last night and work up with a blood glucose of 223mg/dL this morning.    Physical Activity: No formal exercise routine. Continues with PT 2x per week for shoulder.  Pool is not fully functional.  Need to replace the filter motor.  Has the new motor in her garage, just needs to find someone to replace it with the old one.  Previously shared that she enjoys swimming

## 2025-07-09 ENCOUNTER — HOSPITAL ENCOUNTER (OUTPATIENT)
Dept: PHYSICAL THERAPY | Facility: CLINIC | Age: 70
Setting detail: THERAPIES SERIES
Discharge: HOME | End: 2025-07-09
Payer: COMMERCIAL

## 2025-07-09 PROCEDURE — 97140 MANUAL THERAPY 1/> REGIONS: CPT

## 2025-07-09 PROCEDURE — 97110 THERAPEUTIC EXERCISES: CPT

## 2025-07-09 NOTE — FLOWSHEET NOTE
[] [facility]  Outpatient Rehabilitation &  Therapy  [address]  [phone]  [phone] [x] [facility]  Outpatient Rehabilitation &  Therapy  [address]  P: [phone]  F: [phone] [] [facility]  Outpatient Rehabilitation &  Therapy  [address]  P: [phone]  F: [phone] [] [facility]  Outpatient Rehabilitation &  Therapy  [address]  [phone]  [phone] [] [facility]  Outpatient Rehabilitation &  Therapy  [address]   P: [phone]  F: [phone]  [] [facility]  Outpatient Rehabilitation &  Therapy  [address]  P: [phone]  F: [phone] [] [facility]  Outpatient Rehabilitation &  Therapy  [address]  P: [phone]  F: [phone] [] [facility]  Outpatient Rehabilitation &  Therapy  [address]  P: [phone]  F: [phone] [] [facility]  Outpatient Rehabilitation &  Therapy  [address]  P: [phone]  F: [phone]  [] [facility] Outpatient Rehabilitation &  Therapy  [address]  P: [phone]  F: [phone]     Physical Therapy Daily Treatment Note    Date:  2025  Patient Name:  Verena Elliott    :  1955  MRN: [id number]    Physician: Claritza De León MD  Insurance:   Primary Ins: [employer] [facility] HMO-POS   Auth after 12th visit    DRY NEEDLING IS NOT COVERED    Medical Diagnosis: M75.112 (ICD-10-CM) - Incomplete tear of left rotator cuff                   Rehab Codes: M25.512, M25.612, R29.3, M62.812, Z73.6  Onset Date: 25               Next 's appt: 25  Visit# / total visits: 10/12    Cancels/No Shows: 10    Subjective:    Pain:  [x] Yes  [] No Location: L shoulder Pain Rating: (0-10 scale) 5/10  Pain

## 2025-07-11 ENCOUNTER — HOSPITAL ENCOUNTER (OUTPATIENT)
Dept: PHYSICAL THERAPY | Facility: CLINIC | Age: 70
Setting detail: THERAPIES SERIES
Discharge: HOME | End: 2025-07-11
Payer: COMMERCIAL

## 2025-07-11 PROCEDURE — 97140 MANUAL THERAPY 1/> REGIONS: CPT

## 2025-07-11 PROCEDURE — 97110 THERAPEUTIC EXERCISES: CPT

## 2025-07-11 NOTE — FLOWSHEET NOTE
[] [facility]  Outpatient Rehabilitation &  Therapy  [address]  [phone]  [phone] [x] [facility]  Outpatient Rehabilitation &  Therapy  [address]  P: [phone]  F: [phone] [] [facility]  Outpatient Rehabilitation &  Therapy  [address]  P: [phone]  F: [phone] [] [facility]  Outpatient Rehabilitation &  Therapy  [address]  [phone]  [phone] [] [facility]  Outpatient Rehabilitation &  Therapy  [address]   P: [phone]  F: [phone]  [] [facility]  Outpatient Rehabilitation &  Therapy  [address]  P: [phone]  F: [phone] [] [facility]  Outpatient Rehabilitation &  Therapy  [address]  P: [phone]  F: [phone] [] [facility]  Outpatient Rehabilitation &  Therapy  [address]  P: [phone]  F: [phone] [] [facility]  Outpatient Rehabilitation &  Therapy  [address]  P: [phone]  F: [phone]  [] [facility] Outpatient Rehabilitation &  Therapy  [address]  P: [phone]  F: [phone]     Physical Therapy Daily Treatment Note    Date:  2025  Patient Name:  Verena Elliott    :  1955  MRN: [id number]    Physician: Claritza De León MD  Insurance:   Primary Ins: [facility] HMO-POS   Auth after 12th visit    DRY NEEDLING IS NOT COVERED    Medical Diagnosis: M75.112 (ICD-10-CM) - Incomplete tear of left rotator cuff                   Rehab Codes: M25.512, M25.612, R29.3, M62.812, Z73.6  Onset Date: 25               Next 's appt: 25  Visit# / total visits:     Cancels/No Shows: 0    Subjective:    Pain:  [x] Yes  [] No Location: L shoulder Pain Rating: (0-10 scale) 7/10  Pain

## 2025-07-14 ENCOUNTER — HOSPITAL ENCOUNTER (OUTPATIENT)
Dept: PHYSICAL THERAPY | Facility: CLINIC | Age: 70
Setting detail: THERAPIES SERIES
Discharge: HOME | End: 2025-07-14
Payer: COMMERCIAL

## 2025-07-14 PROCEDURE — 97110 THERAPEUTIC EXERCISES: CPT

## 2025-07-14 PROCEDURE — 97140 MANUAL THERAPY 1/> REGIONS: CPT

## 2025-07-14 NOTE — FLOWSHEET NOTE
[] [facility]  Outpatient Rehabilitation &  Therapy  [address]  [phone]  [phone] [x] [facility]  Outpatient Rehabilitation &  Therapy  [address]  P: [phone]  F: [phone] [] [facility]  Outpatient Rehabilitation &  Therapy  [address]  P: [phone]  F: [phone] [] [facility]  Outpatient Rehabilitation &  Therapy  [address]  [phone]  [phone] [] [facility]  Outpatient Rehabilitation &  Therapy  [address]   P: [phone]  F: [phone]  [] [facility]  Outpatient Rehabilitation &  Therapy  [address]  P: [phone]  F: [phone] [] [facility]  Outpatient Rehabilitation &  Therapy  [address]  P: [phone]  F: [phone] [] [facility]  Outpatient Rehabilitation &  Therapy  [address]  P: [phone]  F: [phone] [] [facility]  Outpatient Rehabilitation &  Therapy  [address]  P: [phone]  F: [phone]  [] [facility] Outpatient Rehabilitation &  Therapy  [address]  P: [phone]  F: [phone]     Physical Therapy Daily Treatment Note    Date:  2025  Patient Name:  Verena Elliott    :  1955  MRN: [id number]    Physician: Claritza De León MD  Insurance:   Primary Ins: [employer] [facility] HMO-POS   Auth after 12th visit    DRY NEEDLING IS NOT COVERED    Medical Diagnosis: M75.112 (ICD-10-CM) - Incomplete tear of left rotator cuff                   Rehab Codes: M25.512, M25.612, R29.3, M62.812, Z73.6  Onset Date: 25               Next 's appt: 25  Visit# / total visits:     Cancels/No Shows: 1/0    Subjective:    Pain:  [x] Yes  [] No Location: L shoulder Pain Rating: (0-10 scale)

## 2025-07-14 NOTE — PROGRESS NOTES
[] [facility]  Outpatient Rehabilitation &  Therapy  [address]  [phone]  [phone] [x] [facility]  Outpatient Rehabilitation &  Therapy  [address]  P: [phone]  F: [phone] [] [facility]  Outpatient Rehabilitation &  Therapy  [address]  P: [phone]  F: [phone] [] [facility]  Outpatient Rehabilitation &  Therapy  [address]  [phone]  [phone] [] [facility]  Outpatient Rehabilitation &  Therapy  [address]   P: [phone]  F: [phone]  [] [facility]  Outpatient Rehabilitation &  Therapy  [address]  P: [phone]  F: [phone] [] [facility]  Outpatient Rehabilitation &  Therapy  [address]  P: [phone]  F: [phone] [] [facility]  Outpatient Rehabilitation &  Therapy  [address]  P: [phone]  F: [phone] [] [facility]  Outpatient Rehabilitation &  Therapy  [address]  P: [phone]  F: [phone]  [] [facility] Outpatient Rehabilitation &  Therapy  [address]  P: [phone]  F: [phone]     Physical Therapy Progress Note    Date: 2025      Patient: Verena Elliott  : 1955  MRN: [id number]    Physician: Claritza De León MD  Insurance:   Primary Ins: [employer] HMO-POS   Auth after 12th visit    DRY NEEDLING IS NOT COVERED    Medical Diagnosis: M75.112 (ICD-10-CM) - Incomplete tear of left rotator cuff                   Rehab Codes: M25.512, M25.612, R29.3, M62.812, Z73.6  Onset Date: 25               Next 's appt: 25  Visit# / total visits:                     Cancels/No Shows:   Date range of services: 25 to 25     Subjective:    Pain:  [x] Yes  [] No

## 2025-07-17 ENCOUNTER — HOSPITAL ENCOUNTER (OUTPATIENT)
Dept: PHYSICAL THERAPY | Facility: CLINIC | Age: 70
Setting detail: THERAPIES SERIES
End: 2025-07-17
Payer: COMMERCIAL

## 2025-08-08 ENCOUNTER — HOSPITAL ENCOUNTER (OUTPATIENT)
Dept: PHYSICAL THERAPY | Facility: CLINIC | Age: 70
Setting detail: THERAPIES SERIES
Discharge: HOME | End: 2025-08-08
Payer: COMMERCIAL

## 2025-08-08 PROCEDURE — 97110 THERAPEUTIC EXERCISES: CPT

## 2025-08-08 PROCEDURE — 97140 MANUAL THERAPY 1/> REGIONS: CPT

## 2025-08-13 ENCOUNTER — HOSPITAL ENCOUNTER (OUTPATIENT)
Dept: PHYSICAL THERAPY | Facility: CLINIC | Age: 70
Setting detail: THERAPIES SERIES
Discharge: HOME | End: 2025-08-13
Payer: COMMERCIAL

## 2025-08-13 PROCEDURE — 97140 MANUAL THERAPY 1/> REGIONS: CPT

## 2025-08-13 PROCEDURE — 97110 THERAPEUTIC EXERCISES: CPT

## 2025-08-15 ENCOUNTER — HOSPITAL ENCOUNTER (OUTPATIENT)
Dept: PHYSICAL THERAPY | Facility: CLINIC | Age: 70
Setting detail: THERAPIES SERIES
Discharge: HOME | End: 2025-08-15
Payer: COMMERCIAL

## 2025-08-15 PROCEDURE — 97140 MANUAL THERAPY 1/> REGIONS: CPT

## 2025-08-15 PROCEDURE — 97110 THERAPEUTIC EXERCISES: CPT

## 2025-08-18 ENCOUNTER — HOSPITAL ENCOUNTER (OUTPATIENT)
Dept: PHYSICAL THERAPY | Facility: CLINIC | Age: 70
Setting detail: THERAPIES SERIES
Discharge: HOME | End: 2025-08-18
Payer: COMMERCIAL

## 2025-08-18 PROCEDURE — 97140 MANUAL THERAPY 1/> REGIONS: CPT

## 2025-08-18 PROCEDURE — 97110 THERAPEUTIC EXERCISES: CPT

## 2025-08-20 ENCOUNTER — OFFICE VISIT (OUTPATIENT)
Dept: FAMILY MEDICINE CLINIC | Age: 70
End: 2025-08-20
Payer: COMMERCIAL

## 2025-08-20 VITALS
WEIGHT: 215.5 LBS | TEMPERATURE: 97.1 F | DIASTOLIC BLOOD PRESSURE: 70 MMHG | HEART RATE: 82 BPM | BODY MASS INDEX: 33.75 KG/M2 | SYSTOLIC BLOOD PRESSURE: 122 MMHG | OXYGEN SATURATION: 97 %

## 2025-08-20 DIAGNOSIS — H25.9 AGE-RELATED CATARACT OF BOTH EYES, UNSPECIFIED AGE-RELATED CATARACT TYPE: ICD-10-CM

## 2025-08-20 DIAGNOSIS — J45.20 MILD INTERMITTENT ASTHMA WITHOUT COMPLICATION: ICD-10-CM

## 2025-08-20 DIAGNOSIS — E78.5 DYSLIPIDEMIA: ICD-10-CM

## 2025-08-20 DIAGNOSIS — E11.9 TYPE 2 DIABETES MELLITUS WITHOUT COMPLICATION, UNSPECIFIED WHETHER LONG TERM INSULIN USE (HCC): Primary | ICD-10-CM

## 2025-08-20 LAB — HBA1C MFR BLD: 6.9 %

## 2025-08-20 PROCEDURE — 3044F HG A1C LEVEL LT 7.0%: CPT

## 2025-08-20 PROCEDURE — 1123F ACP DISCUSS/DSCN MKR DOCD: CPT

## 2025-08-20 PROCEDURE — 83036 HEMOGLOBIN GLYCOSYLATED A1C: CPT

## 2025-08-20 PROCEDURE — 3078F DIAST BP <80 MM HG: CPT

## 2025-08-20 PROCEDURE — 3074F SYST BP LT 130 MM HG: CPT

## 2025-08-20 PROCEDURE — 1159F MED LIST DOCD IN RCRD: CPT

## 2025-08-20 PROCEDURE — 99214 OFFICE O/P EST MOD 30 MIN: CPT

## 2025-08-20 RX ORDER — VALACYCLOVIR HYDROCHLORIDE 1 G/1
1000 TABLET, FILM COATED ORAL 2 TIMES DAILY PRN
Qty: 14 TABLET | Refills: 1
Start: 2025-08-20

## 2025-08-21 ENCOUNTER — HOSPITAL ENCOUNTER (OUTPATIENT)
Dept: PHYSICAL THERAPY | Facility: CLINIC | Age: 70
Setting detail: THERAPIES SERIES
Discharge: HOME | End: 2025-08-21
Payer: COMMERCIAL

## 2025-08-21 PROCEDURE — 97110 THERAPEUTIC EXERCISES: CPT

## 2025-08-21 PROCEDURE — 97140 MANUAL THERAPY 1/> REGIONS: CPT

## 2025-08-25 ENCOUNTER — HOSPITAL ENCOUNTER (OUTPATIENT)
Dept: PHYSICAL THERAPY | Facility: CLINIC | Age: 70
Setting detail: THERAPIES SERIES
Discharge: HOME | End: 2025-08-25
Payer: COMMERCIAL

## 2025-08-25 PROCEDURE — 97110 THERAPEUTIC EXERCISES: CPT

## 2025-08-28 ENCOUNTER — HOSPITAL ENCOUNTER (OUTPATIENT)
Dept: PHYSICAL THERAPY | Facility: CLINIC | Age: 70
Setting detail: THERAPIES SERIES
Discharge: HOME | End: 2025-08-28
Payer: COMMERCIAL

## 2025-08-28 PROCEDURE — 97110 THERAPEUTIC EXERCISES: CPT

## (undated) DEVICE — FIBER ENT HOLM LSR 200 MIC STRL LTX FRE

## (undated) DEVICE — SOLUTION IRRIG 3000ML STRL H2O USP UROMATIC PLAS CONT

## (undated) DEVICE — STAZ ENDO KIT: Brand: MEDLINE INDUSTRIES, INC.

## (undated) DEVICE — SINGLE ACTION PUMPING SYSTEM

## (undated) DEVICE — CONTAINER,SPECIMEN,OR STERILE,4OZ: Brand: MEDLINE

## (undated) DEVICE — NITINOL STONE RETRIEVAL BASKET: Brand: ZERO TIP

## (undated) DEVICE — CONE TIP URETERAL CATHETER: Brand: URETERAL CATHETER

## (undated) DEVICE — GLOVE SURG SZ 65 CRM LTX FREE POLYISOPRENE POLYMER BEAD ANTI

## (undated) DEVICE — MASTISOL ADHESIVE LIQ 2/3ML

## (undated) DEVICE — STAZ CYSTO: Brand: MEDLINE INDUSTRIES, INC.

## (undated) DEVICE — LASER SURG FIBER MASTERPULSE

## (undated) DEVICE — CHECK-FLO HEMOSTASIS ASSEMBLY: Brand: CHECK-FLO

## (undated) DEVICE — SYRINGE 20ML LL S/C 50

## (undated) DEVICE — STRIP,CLOSURE,WOUND,MEDI-STRIP,1/2X4: Brand: MEDLINE

## (undated) DEVICE — SYRINGE MED 30ML STD CLR PLAS LUERLOCK TIP N CTRL DISP